# Patient Record
Sex: MALE | Race: WHITE | NOT HISPANIC OR LATINO | Employment: OTHER | URBAN - METROPOLITAN AREA
[De-identification: names, ages, dates, MRNs, and addresses within clinical notes are randomized per-mention and may not be internally consistent; named-entity substitution may affect disease eponyms.]

---

## 2018-03-20 ENCOUNTER — APPOINTMENT (OUTPATIENT)
Dept: LAB | Facility: CLINIC | Age: 83
End: 2018-03-20
Attending: INTERNAL MEDICINE
Payer: MEDICARE

## 2018-03-20 ENCOUNTER — TRANSCRIBE ORDERS (OUTPATIENT)
Dept: LAB | Facility: CLINIC | Age: 83
End: 2018-03-20

## 2018-03-20 DIAGNOSIS — N18.30 CHRONIC KIDNEY DISEASE, STAGE III (MODERATE) (CMS/HCC): ICD-10-CM

## 2018-03-20 DIAGNOSIS — D64.9 ANEMIA, UNSPECIFIED TYPE: Primary | ICD-10-CM

## 2018-03-20 DIAGNOSIS — D64.9 ANEMIA, UNSPECIFIED TYPE: ICD-10-CM

## 2018-03-20 LAB
ALBUMIN SERPL-MCNC: 4.3 G/DL (ref 3.4–5)
ALP SERPL-CCNC: 59 IU/L (ref 35–126)
ALT SERPL-CCNC: 21 IU/L (ref 16–63)
ANION GAP SERPL CALC-SCNC: 10 MEQ/L (ref 3–15)
AST SERPL-CCNC: 28 IU/L (ref 15–41)
BILIRUB SERPL-MCNC: 0.9 MG/DL (ref 0.3–1.2)
BUN SERPL-MCNC: 27 MG/DL (ref 8–20)
CALCIUM SERPL-MCNC: 9.9 MG/DL (ref 8.9–10.3)
CHLORIDE SERPL-SCNC: 106 MMOL/L (ref 98–109)
CO2 SERPL-SCNC: 27 MMOL/L (ref 22–32)
CREAT SERPL-MCNC: 1.5 MG/DL (ref 0.8–1.3)
ERYTHROCYTE [DISTWIDTH] IN BLOOD BY AUTOMATED COUNT: 15.6 % (ref 11.6–14.4)
GFR SERPL CREATININE-BSD FRML MDRD: 44.8 ML/MIN/1.73M*2
GLUCOSE SERPL-MCNC: 78 MG/DL (ref 70–99)
HCT VFR BLDCO AUTO: 42.9 % (ref 40–51)
HGB BLD-MCNC: 14.5 G/DL (ref 13.7–17.5)
MCH RBC QN AUTO: 34.1 PG (ref 28–33.2)
MCHC RBC AUTO-ENTMCNC: 33.8 G/DL (ref 32.2–36.5)
MCV RBC AUTO: 100.9 FL (ref 83–98)
PDW BLD AUTO: 10.6 FL (ref 9.4–12.4)
PLATELET # BLD AUTO: 159 K/UL (ref 150–350)
POTASSIUM SERPL-SCNC: 4.7 MMOL/L (ref 3.6–5.1)
PROT SERPL-MCNC: 6.3 G/DL (ref 6–8.2)
RBC # BLD AUTO: 4.25 M/UL (ref 4.5–5.8)
SODIUM SERPL-SCNC: 143 MMOL/L (ref 136–144)
WBC # BLD AUTO: 5.66 K/UL (ref 3.8–10.5)

## 2018-03-20 PROCEDURE — 80053 COMPREHEN METABOLIC PANEL: CPT

## 2018-03-20 PROCEDURE — 85027 COMPLETE CBC AUTOMATED: CPT

## 2018-03-20 PROCEDURE — 36415 COLL VENOUS BLD VENIPUNCTURE: CPT

## 2018-03-26 ENCOUNTER — TELEPHONE (OUTPATIENT)
Dept: PRIMARY CARE | Facility: CLINIC | Age: 83
End: 2018-03-26

## 2018-03-26 NOTE — TELEPHONE ENCOUNTER
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  Hgb normal.  Cr stable at 1.5.  Thanks

## 2018-06-25 ENCOUNTER — OFFICE VISIT (OUTPATIENT)
Dept: PRIMARY CARE | Facility: CLINIC | Age: 83
End: 2018-06-25
Payer: MEDICARE

## 2018-06-25 ENCOUNTER — APPOINTMENT (OUTPATIENT)
Dept: LAB | Facility: CLINIC | Age: 83
End: 2018-06-25
Attending: INTERNAL MEDICINE
Payer: MEDICARE

## 2018-06-25 VITALS
TEMPERATURE: 97.7 F | HEART RATE: 59 BPM | WEIGHT: 149.2 LBS | DIASTOLIC BLOOD PRESSURE: 82 MMHG | OXYGEN SATURATION: 94 % | RESPIRATION RATE: 17 BRPM | SYSTOLIC BLOOD PRESSURE: 116 MMHG

## 2018-06-25 DIAGNOSIS — I49.8 VENTRICULAR BIGEMINY: ICD-10-CM

## 2018-06-25 DIAGNOSIS — I10 ESSENTIAL HYPERTENSION: ICD-10-CM

## 2018-06-25 DIAGNOSIS — E78.2 MIXED HYPERLIPIDEMIA: ICD-10-CM

## 2018-06-25 DIAGNOSIS — I49.49 PREMATURE BEATS: Primary | ICD-10-CM

## 2018-06-25 DIAGNOSIS — N18.30 CKD (CHRONIC KIDNEY DISEASE) STAGE 3, GFR 30-59 ML/MIN (CMS/HCC): ICD-10-CM

## 2018-06-25 LAB
ANION GAP SERPL CALC-SCNC: 9 MEQ/L (ref 3–15)
BUN SERPL-MCNC: 32 MG/DL (ref 8–20)
CALCIUM SERPL-MCNC: 9.6 MG/DL (ref 8.9–10.3)
CHLORIDE SERPL-SCNC: 105 MMOL/L (ref 98–109)
CO2 SERPL-SCNC: 26 MMOL/L (ref 22–32)
CREAT SERPL-MCNC: 1.7 MG/DL (ref 0.8–1.3)
GFR SERPL CREATININE-BSD FRML MDRD: 38.7 ML/MIN/1.73M*2
GLUCOSE SERPL-MCNC: 87 MG/DL (ref 70–99)
POTASSIUM SERPL-SCNC: 4.3 MMOL/L (ref 3.6–5.1)
SODIUM SERPL-SCNC: 140 MMOL/L (ref 136–144)
TSH SERPL DL<=0.05 MIU/L-ACNC: 1.43 MIU/ML (ref 0.34–5.6)

## 2018-06-25 PROCEDURE — 80048 BASIC METABOLIC PNL TOTAL CA: CPT

## 2018-06-25 PROCEDURE — 36415 COLL VENOUS BLD VENIPUNCTURE: CPT

## 2018-06-25 PROCEDURE — 93000 ELECTROCARDIOGRAM COMPLETE: CPT | Performed by: INTERNAL MEDICINE

## 2018-06-25 PROCEDURE — 99214 OFFICE O/P EST MOD 30 MIN: CPT | Performed by: INTERNAL MEDICINE

## 2018-06-25 PROCEDURE — 84443 ASSAY THYROID STIM HORMONE: CPT

## 2018-06-25 RX ORDER — OMEPRAZOLE 20 MG/1
20 CAPSULE, DELAYED RELEASE ORAL AS NEEDED
COMMUNITY
End: 2023-05-15 | Stop reason: ALTCHOICE

## 2018-06-25 RX ORDER — METOPROLOL SUCCINATE 25 MG/1
25 TABLET, EXTENDED RELEASE ORAL
COMMUNITY
Start: 2015-12-16 | End: 2020-07-24

## 2018-06-25 RX ORDER — LISINOPRIL 40 MG/1
60 TABLET ORAL DAILY
COMMUNITY
Start: 2015-05-28 | End: 2020-12-01 | Stop reason: HOSPADM

## 2018-06-25 RX ORDER — AMLODIPINE BESYLATE 10 MG/1
5 TABLET ORAL DAILY
COMMUNITY
End: 2019-07-26

## 2018-06-25 RX ORDER — FINASTERIDE 5 MG/1
5 TABLET, FILM COATED ORAL DAILY
COMMUNITY

## 2018-06-25 ASSESSMENT — ENCOUNTER SYMPTOMS
HEMATURIA: 0
SHORTNESS OF BREATH: 0
PALPITATIONS: 1
CHILLS: 0
ABDOMINAL PAIN: 0
COUGH: 0
FEVER: 0

## 2018-06-25 NOTE — ASSESSMENT & PLAN NOTE
Patient over the last month or so describes premature cardiac beats.  Sometimes it is occurring in a bigeminal pattern.  Not associated with chest pain, shortness of breath, palpitations waking him from sleep were greatly disturbing.  Symptoms are fairly mild in severity.  Distant history of thyroiditis but nothing recently.  Last blood work was 3 months ago but did not show any abnormalities at that time.  Recommend check basic metabolic profile to rule out new electrolyte disturbance such as hypokalemia that may be causing this.  Check TSH to rule out occult thyroid disease.  No current medications appear to be triggers and he takes no over-the-counter supplements that may be a trigger.  Recommend follow-up with cardiology due to his EKG showing normal sinus rhythm at 61 with frequent PVCs and a ventricular bigeminal pattern.  Patient has had an echocardiogram in the past which has ruled out major structural heart disease.  Cardiology consult may choose to repeat this study.  They cardiology evaluation may result in extended monitoring.  It may be reasonable after evaluation with no contributing causes identified to simply increase his dose of beta-blocker.

## 2018-07-02 ENCOUNTER — TELEPHONE (OUTPATIENT)
Dept: PRIMARY CARE | Facility: CLINIC | Age: 83
End: 2018-07-02

## 2018-07-02 NOTE — TELEPHONE ENCOUNTER
----- Message from Alex Elliott MD sent at 6/29/2018 10:58 AM EDT -----  Please call patient that his lab results are stable.  Creatinine is at his upper limit of his range at 1.7.  He usually runs 1.5 up to 1.7.  TSH normal.  Thanks

## 2018-07-13 ENCOUNTER — OFFICE VISIT (OUTPATIENT)
Dept: CARDIOLOGY | Facility: CLINIC | Age: 83
End: 2018-07-13
Payer: MEDICARE

## 2018-07-13 VITALS
WEIGHT: 146 LBS | BODY MASS INDEX: 24.92 KG/M2 | HEIGHT: 64 IN | SYSTOLIC BLOOD PRESSURE: 110 MMHG | DIASTOLIC BLOOD PRESSURE: 68 MMHG

## 2018-07-13 DIAGNOSIS — I10 ESSENTIAL HYPERTENSION: ICD-10-CM

## 2018-07-13 DIAGNOSIS — I49.49 PREMATURE BEATS: ICD-10-CM

## 2018-07-13 DIAGNOSIS — I35.1 NONRHEUMATIC AORTIC VALVE INSUFFICIENCY: Primary | ICD-10-CM

## 2018-07-13 PROCEDURE — 99205 OFFICE O/P NEW HI 60 MIN: CPT | Performed by: INTERNAL MEDICINE

## 2018-07-13 RX ORDER — SIMVASTATIN 40 MG/1
40 TABLET, FILM COATED ORAL NIGHTLY
COMMUNITY
End: 2018-11-13

## 2018-07-13 NOTE — LETTER
July 13, 2018     Alex Elliott MD  [address] [address]    Patient: Efrain Cardona Jr.   YOB: 1934   Date of Visit: 7/13/2018       Dear Dr. Elliott:    Thank you for referring Efrain Cardona to me for evaluation. Below are my notes for this consultation.    If you have questions, please do not hesitate to call me. I look forward to following your patient along with you.         Sincerely,        Timoteo Kim MD        CC: MD Taran Nowak MD

## 2018-07-13 NOTE — PROGRESS NOTES
Cardiology Consult     Reason for visit: Extrasystoles on EKG.  Chief Complaint   Patient presents with   • Cardiovascular Evaluation     Palpitations   • Hypertension       HPI     Efrain Cardona Jr. is a 84 y.o. male  who presents for  evaluation of premature ectopic complexes documented on EKG.    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  He has been treated for systemic hypertension for about 18-20 years.  He does not have any personal history of myocardial infarction, congestive heart failure or sustained arrhythmia.  He was diagnosed with mild aortic valve insufficiency when a murmur was discovered at the time of his diagnosis of thyroiditis..  He has been aggressively treated for his hypertension since diagnosis.  He enjoys a vigorous active lifestyle and exercises very vigorous levels on a routine basis.    He is very conscientious about his blood pressure and checks in on an intermittent basis at home.  He is noticed on occasion that there is some skipped beats evident when he checks his pulse.  He has no sensation whatsoever of palpitations.  He does not have any unusual sensations of erratic beats in his chest.  He has absolutely no symptoms of chest tightness or other complaints with his exercise program.  He states his exercise program has not diminished substantially for some time.    Seen in his family physician's office an EKG was performed and did demonstrate unifocal isolated extrasystoles with an otherwise normal EKG pattern.    Allergies or intolerances include minocycline and fulvicin.  He no longer takes aspirin because of several episodes of gastrointestinal bleeding and anemia that have resolved since aspirin is been discontinued.    Family history remarkable for coronary bypass graft surgery in his brother.    Drinks alcohol socially.  Not a smoker.    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  He is also had a left inguinal herniorrhaphy.    He has a history of intermittent diverticulitis.  Is been diagnosed with lumbar scoliosis.  In 1991 yet a history of thyroiditis that was treated with steroids and resolved. He does describe sporadic episodes of double vision that occurred over the past several years that have not been diagnosed despite ophthalmologic evaluation.  He experiences intermittent mild pedal edema felt secondary to amlodipine.    Past Medical History:   Diagnosis Date   • Abnormal EKG    • Arrhythmia    • Chronic kidney disease    • Disease of thyroid gland     1991   • Diverticulitis of colon     Diverticulosis   • GI (gastrointestinal bleed)     X3   last 2015   • Heart murmur    • Hyperlipidemia    • Hypertension    • Infectious viral hepatitis    • Lipid disorder    • Renal failure      Past Surgical History:   Procedure Laterality Date   • HERNIA REPAIR      7/2001   • KIDNEY SURGERY Left     kidney cancer     Griseofulvin and Tetracycline  Current Outpatient Prescriptions   Medication Sig Dispense Refill   • allopurinol (ZYLOPRIM) 100 mg tablet 100 mg 2 (two) times a day. Take 2 tablets once daily      • amLODIPine (NORVASC) 10 mg tablet Take 10 mg by mouth daily.     • finasteride (PROSCAR) 5 mg tablet Take 5 mg by mouth daily.     • lisinopril (PRINIVIL) 40 mg tablet 40 mg. Take 1.5 tablets by oral route every day     • metoprolol succinate XL (TOPROL-XL) 25 mg 24 hr tablet 25 mg. Take HALF tablet by oral route every day     • omeprazole (PriLOSEC) 20 mg capsule 20 mg. 1 capsule daily 30 minutes to 1 hour before a meal     • simvastatin (ZOCOR) 40 mg tablet Take 40 mg by mouth nightly.     • ferrous sulfate 325 mg (65 mg iron) ER capsule 1 po BID       No current facility-administered medications for this visit.      Social History     Social History   • Marital status:      Spouse name: N/A   • Number of children: N/A   • Years of  education: N/A     Social History Main Topics   • Smoking status: Former Smoker     Quit date: 1970   • Smokeless tobacco: Never Used      Comment: PIPE    • Alcohol use Yes      Comment: wine, daily   • Drug use: Unknown   • Sexual activity: Not Asked     Other Topics Concern   • None     Social History Narrative   • None     Family History   Problem Relation Age of Onset   • Lung cancer Mother    • Leukemia Father         ROS   Objective   Vitals:    07/13/18 0825   BP: 110/68        Physical Exam   Constitutional: He is oriented to person, place, and time.   Pleasant fit appearing man not acutely distressed.   HENT:   Head: Normocephalic and atraumatic.   Eyes: Conjunctivae are normal. No scleral icterus.   Neck: Normal range of motion. Neck supple. No thyromegaly present.   Cardiovascular: Normal rate, regular rhythm and intact distal pulses.  Exam reveals no gallop.    Murmur (Soft systolic murmur along the right sternal border.  No diastolic murmur heard at rest or provoked with maneuver.) heard.  No orthostatic change of the blood pressure.   Pulmonary/Chest: Effort normal and breath sounds normal.   Abdominal: Soft. Bowel sounds are normal.   Musculoskeletal: Normal range of motion. He exhibits no edema, tenderness or deformity.   Mild scoliosis of the lumbar spine.   Neurological: He is alert and oriented to person, place, and time.   Skin: Skin is warm and dry. No rash noted.   Psychiatric: He has a normal mood and affect. His behavior is normal. Judgment and thought content normal.   Nursing note and vitals reviewed.      Labs   Lab Results   Component Value Date    WBC 5.66 03/20/2018    HGB 14.5 03/20/2018    HCT 42.9 03/20/2018     03/20/2018    CHOL 170 09/19/2017    TRIG 36 09/19/2017    HDL 71 09/19/2017    ALT 21 03/20/2018    AST 28 03/20/2018     06/25/2018    K 4.3 06/25/2018     06/25/2018    CREATININE 1.7 (H) 06/25/2018    BUN 32 (H) 06/25/2018    CO2 26 06/25/2018    TSH  1.43 06/25/2018    PSA 2.02 08/26/2015    INR 1.0 09/22/2015    HGBA1C 4.8 08/26/2015   Imaging  An echocardiogram that was obtained in October was reviewed in detail.  It shows normal left ventricular size and function with preserved ejection fraction.  There is evidence of mild aortic insufficiency.    ECG   sinus rhythm, occasional premature ventricular beats  The underlying pattern is otherwise normal.     Assessment and plan:    1.  Occasional extrasystoles on EKG.  Completely asymptomatic.  Also sounds like they are completely suppressed when he exercises.  They have a unifocal appearance and a sporadic occurrence and occur in the face of stable left ventricular function and good control of hypertension with normal labs.  Therefore my impression that they are benign and prognosis.  They may indeed be related to long history of hypertension and mild AI but in view of their current pattern and limited occurrence and the results of all other studies I would recommend only a course of observation of the symptoms with no limitation in activity, no further cardiac studies and no change in medications.    2.  Hypertension outstanding control in the face of his current medications and exercise regimen.  I would not make any changes in medications.  I suggested that he speak to his nephrologist about the possible as needed use of diuretics for the edema created by amlodipine.    3.  Aortic insufficiency.  Very minimal progression in this finding since it was first discovered in 1991.  Not clearly evident on examination certainly he has no symptoms of congestive heart failure.  Continued aggressive treatment of his blood pressure is indicated and I would certainly continue the ACE inhibitor.  Echocardiography could be considered in the next 6-12 months in follow-up to follow this finding.  He will continue to pursue antibiotic endocarditis prophylaxis under appropriate circumstances.    4.  Single kidney status post  nephrectomy.  Stable creatinine as of recent laboratory studies.    5.  Gastrointestinal bleeding secondary to aspirin.  No clear etiology found despite aggressive GI workup.  He is off aspirin and his hemoglobin is remained stable.    6.  History of thyroiditis.  Recent laboratory studies reveal normal TSH.    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  He is committed to exercise and I see no reason to limit her changes program.  Again I believe that the extrasystoles palpated by him on exam and seen on EKG are benign and have a favorable prognosis based on the current evaluation and physical examination and lack of symptoms.    I hope to see him in 6 months in follow-up to today's visit but will be happy to see him sooner.  I certainly encouraged him to call me if there is any change in the pattern of palpitations or other symptoms that may be related to a change in his cardiovascular status.    Timoteo Kim MD  7/13/2018

## 2018-10-10 ENCOUNTER — OFFICE VISIT (OUTPATIENT)
Dept: PRIMARY CARE | Facility: CLINIC | Age: 83
End: 2018-10-10
Payer: MEDICARE

## 2018-10-10 VITALS
OXYGEN SATURATION: 95 % | TEMPERATURE: 97.5 F | DIASTOLIC BLOOD PRESSURE: 70 MMHG | WEIGHT: 148 LBS | BODY MASS INDEX: 25.4 KG/M2 | SYSTOLIC BLOOD PRESSURE: 114 MMHG | HEART RATE: 64 BPM | RESPIRATION RATE: 12 BRPM

## 2018-10-10 DIAGNOSIS — I49.49 PREMATURE BEATS: ICD-10-CM

## 2018-10-10 DIAGNOSIS — Z23 ENCOUNTER FOR ADMINISTRATION OF VACCINE: Primary | ICD-10-CM

## 2018-10-10 DIAGNOSIS — I35.1 NONRHEUMATIC AORTIC VALVE INSUFFICIENCY: ICD-10-CM

## 2018-10-10 DIAGNOSIS — I10 ESSENTIAL HYPERTENSION: ICD-10-CM

## 2018-10-10 DIAGNOSIS — I49.8 VENTRICULAR BIGEMINY: ICD-10-CM

## 2018-10-10 DIAGNOSIS — E78.2 MIXED HYPERLIPIDEMIA: ICD-10-CM

## 2018-10-10 DIAGNOSIS — N18.30 CKD (CHRONIC KIDNEY DISEASE) STAGE 3, GFR 30-59 ML/MIN (CMS/HCC): ICD-10-CM

## 2018-10-10 PROCEDURE — G0008 ADMIN INFLUENZA VIRUS VAC: HCPCS | Performed by: INTERNAL MEDICINE

## 2018-10-10 PROCEDURE — 90653 IIV ADJUVANT VACCINE IM: CPT | Performed by: INTERNAL MEDICINE

## 2018-10-10 PROCEDURE — 99214 OFFICE O/P EST MOD 30 MIN: CPT | Mod: 25 | Performed by: INTERNAL MEDICINE

## 2018-10-10 RX ORDER — AMOXICILLIN 500 MG/1
CAPSULE ORAL
Refills: 0 | COMMUNITY
Start: 2018-10-08 | End: 2020-07-24

## 2018-10-10 ASSESSMENT — ENCOUNTER SYMPTOMS
COUGH: 0
ABDOMINAL PAIN: 0
FEVER: 0
SHORTNESS OF BREATH: 0
HEMATURIA: 0
CHILLS: 0
PALPITATIONS: 1

## 2018-10-10 NOTE — ASSESSMENT & PLAN NOTE
Patient was seen by his cardiologist Dr. Timoteo Francis who was not alarmed by his PVCs.  Patient reports that his symptomatic premature beats are much less frequent in the last month or so.  Continue to monitor and continue beta-blocker.

## 2018-10-10 NOTE — PROGRESS NOTES
Alex Elliott MD  Geriatric Medicine                                                    GERIATRIC MEDICINE    Subjective      Patient ID: Efrain Cardona Jr. is a 84 y.o. male.    Disease Management Visit    The patient is here for Disease Management for evaluation and management of the following chronic medical problems.    New problem:  For the last month or so patient has had frequent premature beats.  Occasionally occurring in a bigeminal pattern.  Not associated with chest pain, shortness of breath, palpitations that are significant or disturbing.  Her distant history of thyroiditis but normal TSH on recent monitoring over the last several years.    And    Chronic problem review  This includes: symptom review, medication regimen review, laboratory monitoring, and review of diagnostic testing.    Hypertension  --- Update 6/25/18: Stable.  ---update 2/26/18: Stable; still occasional edema on hot days from amlodipine but he is tolerating it.  ---Update 10/30/17: home BP stable.  --- Update 7/5/17: Home BP tracking stable--however he reports some variability  --- Previous visits: BP is stable on dose reduction of Norvasc and a small dose of Toprol --summertime edema from Norvasc.  --- Previous visit:Patient notes that his home blood pressure monitoring is all from 10-20 points higher than what we check in the office.  He reports running in the systolic range of 125-140 at home.  --- Patient asked about switching from Vasotec to lisinopril do to major cost advantages.  He also notes summertime edema from Norvasc.  Medications  Toprol 12.5 mg daily  Norvasc 10 mg daily    -he does report some mild venous insufficiency edema from the higher dose of Norvasc.  Lisinopril 30 mg daily       (((   ASA   )))  --stopped due to study from [place] recommending against ASA for primary prevention    Anemia  -He has recurrent iron deficiency anemia.  --- Update 6/25/18: Stable.  Recent hemoglobin has been normal most  recently 3/20/18 was 14.5.  This is normalized since he stopped aspirin.  ---update 2/26/18: EGD done by GI this summer was negative.He is due for follow-up CBC next month.  Already ordered.  --- Update 7/5/17: Patient has had no signs of GI bleeding.  --- [place] to be due to AVM somewhere in his GI tract causing occult GI bleeding.  ---His recent hemoglobin was 9/19/17 Hgb 14.3  -off ASA  2/15/17 hemoglobin was 13.1  8/18/16 was 14.1  5/25/16 was 12.8  12/30/15 was 14.1  6/30/15 was 13.9  ---Previous visit:  He has since stopped his iron for his capsule endoscopy study which was done approximately 3 weeks ago to evaluate colonic and gastric AVMs as well as the small bowel for source of blood loss.----Patient reports that his capsule endoscopy study was negative. GI recommended stopping aspirin.  ---His gastroenterologist feels this is most likely do to either gastric or colonic AVMs  Last EGD and colonoscopy were done in July 2014.    Hypercholesterolemia  -Goal of treatment is LDL around 100  Primary prevention strategy  Last lipids  9/19/17:    HDL 71  LDL 92  2/15/17: Total cholesterol 151 HDL 69 LDL 77  5/25/16: Total cholesterol 168 HDL 72 LDL 81  8/26/15: Total cholesterol 198 HDL 71   11/24/14:    HDL 79  LDL 84  Medications  Zocor 40 mg daily    GERD  Medications  Prilosec 20 mg daily    BPH  Medication  Proscar 5 mg daily    Gout  --- Update 2/26/18: No recent attacks of gout  Last uric acid 9/19/17 was 4.6  6/30/15 was 4.1  Medications  Allopurinol 200 mg daily  (Prednisone 20-30 mg when necessary with a 2-3 day taper for acute attacks--- patient initiates treatment himself with this regimen)    Chronic kidney disease  ---s/p Left Nephrectomy 2006  -Transitional Cell Ca  Creatinine ranges 1.4-1.8  Most recent creatinine on 3/20/18 was 1.5  6/29/17 was 1.6  2/15/17 was 1.6  8/18/16 was 1.6  5/25/16 was 1.7  12/30/15 was 1.4  4/1/15  was 1.6    History of colon polyp    History of  intermittent diverticulitis  -Treated with Augmentin or goes to Cipro/Flagyl if persistent symptoms    Low back pain  -Chronic degenerative scoliosis    -------------------    Acute problem August 2016 -- diplopia  Progress note:  Over the last 6 months she has had at least 3 episodes of very brief diplopia lasting 1-2 minutes.  He reports if he shuts either eye the sensation is eliminated. With both eyes open it occurs.  This resolves spontaneously within 2 minutes.  Patient denies any other associated neurological symptoms including: He has not had weakness, fatigue, dizziness, speech trouble, etc....  Follow-up: Patient saw his ophthalmologist who examined him and ordered an MRI of the brain which was unremarkable and carotid Doppler study which was normal. He suspected that he had transient extraocular muscle weakness for unclear reasons.  He has not had a recurrence of his symptoms since July 2016.    Hosp ER Eval  9/22/15:  non-spec ABD Pain w normal CT ABD.    -------------------    Preventive health care and Screening summary:   UPDATE  3/6/17  Colonoscopy  -August 2014  Flu vaccine  2014; 2015 HD  -pharm 12/16/15 for HD; high-dose 12/5/16  Pneumovax  Booster 2011; Prevnar 13 given 2/23/15  dT Booster  Tdap March 2012  Zostavax  --patient did not get this from the pharmacy as ordered and now he wants to await the second generation Zostavax from a different  which may have better efficacy--possibly coming later in 2017  PSA  -2.02  8/26/15--note patient is now over the age of 81 so that screening PSA no longer recommended        The following have been reviewed and updated as appropriate in this visit:         Past Medical History:   Diagnosis Date   • Abnormal EKG    • Arrhythmia    • Chronic kidney disease    • Disease of thyroid gland     1991   • Diverticulitis of colon     Diverticulosis   • GI (gastrointestinal bleed)     X3   last 2015   • Heart murmur    • Hyperlipidemia    • Hypertension     • Infectious viral hepatitis    • Lipid disorder    • Renal failure        Past Surgical History:   Procedure Laterality Date   • HERNIA REPAIR      7/2001   • KIDNEY SURGERY Left     kidney cancer       Family History   Problem Relation Age of Onset   • Lung cancer Mother    • Leukemia Father        Social History     Social History   • Marital status:      Spouse name: N/A   • Number of children: N/A   • Years of education: N/A     Occupational History   • Not on file.     Social History Main Topics   • Smoking status: Former Smoker     Quit date: 1970   • Smokeless tobacco: Never Used      Comment: PIPE    • Alcohol use Yes      Comment: wine, daily   • Drug use: Unknown   • Sexual activity: Not on file     Other Topics Concern   • Not on file     Social History Narrative   • No narrative on file       Allergies   Allergen Reactions   • Griseofulvin      Other reaction(s): UNKNOWN  fulvicin    • Tetracycline Rash and Other (see comments)     minocin        Current Outpatient Prescriptions   Medication Sig Dispense Refill   • allopurinol (ZYLOPRIM) 100 mg tablet 100 mg 2 (two) times a day. Take 2 tablets once daily      • amLODIPine (NORVASC) 10 mg tablet Take 10 mg by mouth daily.     • amoxicillin (AMOXIL) 500 mg capsule take 4 capsules by mouth 1 hour prior to appointment  0   • ferrous sulfate 325 mg (65 mg iron) ER capsule Takes as needed when hemoglobin in low      • finasteride (PROSCAR) 5 mg tablet Take 5 mg by mouth daily.     • lisinopril (PRINIVIL) 40 mg tablet 40 mg. Take 1.5 tablets by oral route every day     • metoprolol succinate XL (TOPROL-XL) 25 mg 24 hr tablet 25 mg. Take HALF tablet by oral route every day     • omeprazole (PriLOSEC) 20 mg capsule 20 mg. 1 capsule daily 30 minutes to 1 hour before a meal     • simvastatin (ZOCOR) 40 mg tablet Take 40 mg by mouth nightly.       No current facility-administered medications for this visit.        Review of Systems   Constitutional:  Negative for chills and fever.   Respiratory: Negative for cough and shortness of breath.    Cardiovascular: Positive for palpitations. Negative for chest pain.        Premature beats-occasionally but much less often   Gastrointestinal: Negative for abdominal pain.   Genitourinary: Negative for hematuria.       Objective     Vitals:    10/10/18 1057   BP: 114/70   Pulse: 64   Resp: 12   Temp: 36.4 °C (97.5 °F)   SpO2: 95%       Physical Exam   Constitutional: He appears well-developed and well-nourished.   HENT:   Head: Normocephalic.   Cardiovascular: Normal rate, regular rhythm and normal heart sounds.    Pulmonary/Chest: Effort normal and breath sounds normal. No respiratory distress. He has no wheezes. He has no rales.   Abdominal: Soft. Bowel sounds are normal. There is no tenderness.   Musculoskeletal: He exhibits no edema.   Neurological: He is alert.       Lab Results   Component Value Date    WBC 5.66 03/20/2018    HGB 14.5 03/20/2018    HCT 42.9 03/20/2018    .9 (H) 03/20/2018     03/20/2018         Chemistry        Component Value Date/Time     06/25/2018 1215    K 4.3 06/25/2018 1215     06/25/2018 1215    CO2 26 06/25/2018 1215    BUN 32 (H) 06/25/2018 1215    CREATININE 1.7 (H) 06/25/2018 1215        Component Value Date/Time    CALCIUM 9.6 06/25/2018 1215    ALKPHOS 59 03/20/2018 1247    AST 28 03/20/2018 1247    ALT 21 03/20/2018 1247    BILITOT 0.9 03/20/2018 1247            Lab Results   Component Value Date    CHOL 170 09/19/2017    CHOL 151 02/15/2017    CHOL 160 05/25/2016     Lab Results   Component Value Date    HDL 71 09/19/2017    HDL 69 02/15/2017    HDL 72 05/25/2016     Lab Results   Component Value Date    LDLCALC 92 09/19/2017    LDLCALC 77 02/15/2017    LDLCALC 81 05/25/2016     Lab Results   Component Value Date    TRIG 36 09/19/2017    TRIG 27 (L) 02/15/2017    TRIG 35 05/25/2016     No results found for: CHOLHDL    Lab Results   Component Value Date    TSH  1.43 06/25/2018       Lab Results   Component Value Date    HGBA1C 4.8 08/26/2015       No results found for: HAV, HEPAIGM, HEPBIGM, HEPBCAB, HBEAG, HEPCAB    No results found for: MICROALBUR, [id number]    Assessment/Plan   1. Essential hypertension    2. CKD (chronic kidney disease) stage 3, GFR 30-59 ml/min (CMS/HCC) (HCC)    3. Mixed hyperlipidemia    4. Ventricular bigeminy    5. Nonrheumatic aortic valve insufficiency    6. Premature beats      Problem List Items Addressed This Visit     Premature beats    Current Assessment & Plan     Patient was seen by his cardiologist Dr. Timoteo Francis who was not alarmed by his PVCs.  Patient reports that his symptomatic premature beats are much less frequent in the last month or so.  Continue to monitor and continue beta-blocker.         Essential hypertension - Primary    Current Assessment & Plan     Blood pressure is well controlled on current treatment.  Continue current treatment.         Relevant Orders    Comprehensive metabolic panel    CBC    CKD (chronic kidney disease) stage 3, GFR 30-59 ml/min (CMS/HCC) (HCC)    Current Assessment & Plan     Creatinine is stable with one kidney at 1.7.  Check CMP again in November.         Mixed hyperlipidemia    Current Assessment & Plan     Lipid numbers were excellent in March.  Continue current treatment         Ventricular bigeminy    Current Assessment & Plan     As above.         Nonrheumatic aortic valve insufficiency    Current Assessment & Plan     Mild aortic insufficiency.  Continue antibiotic prophylaxis for dental work.               No Follow-up on file.    Orders Placed This Encounter   Procedures   • Comprehensive metabolic panel     Standing Status:   Future     Standing Expiration Date:   10/10/2019   • CBC     Standing Status:   Future     Standing Expiration Date:   10/10/2019         Alex Elliott MD  10/10/2018

## 2018-11-13 ENCOUNTER — APPOINTMENT (OUTPATIENT)
Dept: LAB | Facility: CLINIC | Age: 83
End: 2018-11-13
Attending: FAMILY MEDICINE
Payer: MEDICARE

## 2018-11-13 ENCOUNTER — HOSPITAL ENCOUNTER (OUTPATIENT)
Dept: RADIOLOGY | Facility: CLINIC | Age: 83
Discharge: HOME | End: 2018-11-13
Attending: FAMILY MEDICINE
Payer: MEDICARE

## 2018-11-13 ENCOUNTER — OFFICE VISIT (OUTPATIENT)
Dept: PRIMARY CARE | Facility: CLINIC | Age: 83
End: 2018-11-13
Payer: MEDICARE

## 2018-11-13 VITALS
TEMPERATURE: 97.4 F | SYSTOLIC BLOOD PRESSURE: 130 MMHG | DIASTOLIC BLOOD PRESSURE: 70 MMHG | OXYGEN SATURATION: 96 % | WEIGHT: 152 LBS | BODY MASS INDEX: 26.09 KG/M2 | HEART RATE: 62 BPM

## 2018-11-13 DIAGNOSIS — N18.30 CKD (CHRONIC KIDNEY DISEASE) STAGE 3, GFR 30-59 ML/MIN (CMS/HCC): ICD-10-CM

## 2018-11-13 DIAGNOSIS — G72.9 MYOPATHY: ICD-10-CM

## 2018-11-13 DIAGNOSIS — I10 ESSENTIAL HYPERTENSION: ICD-10-CM

## 2018-11-13 DIAGNOSIS — M25.512 ACUTE PAIN OF LEFT SHOULDER: ICD-10-CM

## 2018-11-13 DIAGNOSIS — M25.512 ACUTE PAIN OF LEFT SHOULDER: Primary | ICD-10-CM

## 2018-11-13 DIAGNOSIS — M19.012 ARTHRITIS OF LEFT ACROMIOCLAVICULAR JOINT: ICD-10-CM

## 2018-11-13 LAB
ANION GAP SERPL CALC-SCNC: 10 MEQ/L (ref 3–15)
BASOPHILS # BLD: 0.05 K/UL (ref 0.01–0.1)
BASOPHILS NFR BLD: 0.7 %
BUN SERPL-MCNC: 21 MG/DL (ref 8–20)
CALCIUM SERPL-MCNC: 9.1 MG/DL (ref 8.9–10.3)
CHLORIDE SERPL-SCNC: 100 MEQ/L (ref 98–109)
CK SERPL-CCNC: 85 U/L (ref 16–300)
CO2 SERPL-SCNC: 28 MEQ/L (ref 22–32)
CREAT SERPL-MCNC: 1.3 MG/DL (ref 0.8–1.3)
CRP SERPL-MCNC: <=6 MG/L
DIFFERENTIAL METHOD BLD: NORMAL
EOSINOPHIL # BLD: 0.1 K/UL (ref 0.04–0.54)
EOSINOPHIL NFR BLD: 1.4 %
ERYTHROCYTE [DISTWIDTH] IN BLOOD BY AUTOMATED COUNT: 15.5 % (ref 11.6–14.4)
ERYTHROCYTE [SEDIMENTATION RATE] IN BLOOD BY WESTERGREN METHOD: <3 MM/HR
GFR SERPL CREATININE-BSD FRML MDRD: 52.7 ML/MIN/1.73M*2
GLUCOSE SERPL-MCNC: 87 MG/DL (ref 70–99)
HCT VFR BLDCO AUTO: 43.9 % (ref 40.1–51)
HGB BLD-MCNC: 14.7 G/DL (ref 13.7–17.5)
IMM GRANULOCYTES # BLD AUTO: 0.06 K/UL (ref 0–0.08)
IMM GRANULOCYTES NFR BLD AUTO: 0.9 %
LYMPHOCYTES # BLD: 2.24 K/UL (ref 1.2–3.5)
LYMPHOCYTES NFR BLD: 32 %
MCH RBC QN AUTO: 34.2 PG (ref 28–33.2)
MCHC RBC AUTO-ENTMCNC: 33.5 G/DL (ref 32.2–36.5)
MCV RBC AUTO: 102.1 FL (ref 83–98)
MONOCYTES # BLD: 0.67 K/UL (ref 0.3–1)
MONOCYTES NFR BLD: 9.6 %
NEUTROPHILS # BLD: 3.88 K/UL (ref 1.7–7)
NEUTS SEG NFR BLD: 55.4 %
NRBC BLD-RTO: 0 %
PDW BLD AUTO: 10.3 FL (ref 9.4–12.4)
PLATELET # BLD AUTO: 171 K/UL (ref 150–350)
POTASSIUM SERPL-SCNC: 4.5 MEQ/L (ref 3.6–5.1)
RBC # BLD AUTO: 4.3 M/UL (ref 4.5–5.8)
SODIUM SERPL-SCNC: 138 MEQ/L (ref 136–144)
TSH SERPL DL<=0.05 MIU/L-ACNC: 1.61 MIU/L (ref 0.34–5.6)
WBC # BLD AUTO: 7 K/UL (ref 3.8–10.5)

## 2018-11-13 PROCEDURE — 73060 X-RAY EXAM OF HUMERUS: CPT | Mod: LT

## 2018-11-13 PROCEDURE — 72040 X-RAY EXAM NECK SPINE 2-3 VW: CPT

## 2018-11-13 PROCEDURE — 86140 C-REACTIVE PROTEIN: CPT

## 2018-11-13 PROCEDURE — 73030 X-RAY EXAM OF SHOULDER: CPT | Mod: LT

## 2018-11-13 PROCEDURE — 85025 COMPLETE CBC W/AUTO DIFF WBC: CPT

## 2018-11-13 PROCEDURE — 82550 ASSAY OF CK (CPK): CPT

## 2018-11-13 PROCEDURE — 99215 OFFICE O/P EST HI 40 MIN: CPT | Performed by: FAMILY MEDICINE

## 2018-11-13 PROCEDURE — 85652 RBC SED RATE AUTOMATED: CPT

## 2018-11-13 PROCEDURE — 80048 BASIC METABOLIC PNL TOTAL CA: CPT

## 2018-11-13 PROCEDURE — 36415 COLL VENOUS BLD VENIPUNCTURE: CPT

## 2018-11-13 PROCEDURE — 84443 ASSAY THYROID STIM HORMONE: CPT

## 2018-11-13 NOTE — Clinical Note
Saw your patient today. Strange history and presentation. Let me know if you have any other thoughts. He will followup if not improving.

## 2018-11-13 NOTE — PATIENT INSTRUCTIONS
Patient Education     Shoulder Pain  Many things can cause shoulder pain, including:  · An injury to the area.  · Overuse of the shoulder.  · Arthritis.  The source of the pain can be:  · Inflammation.  · An injury to the shoulder joint.  · An injury to a tendon, ligament, or bone.  Follow these instructions at home:  Take these actions to help with your pain:  · Squeeze a soft ball or a foam pad as much as possible. This helps to keep the shoulder from swelling. It also helps to strengthen the arm.  · Take over-the-counter and prescription medicines only as told by your health care provider.  · If directed, apply ice to the area:  ¨ Put ice in a plastic bag.  ¨ Place a towel between your skin and the bag.  ¨ Leave the ice on for 20 minutes, 2-3 times per day. Stop applying ice if it does not help with the pain.  · If you were given a shoulder sling or immobilizer:  ¨ Wear it as told.  ¨ Remove it to shower or bathe.  ¨ Move your arm as little as possible, but keep your hand moving to prevent swelling.  Contact a health care provider if:  · Your pain gets worse.  · Your pain is not relieved with medicines.  · New pain develops in your arm, hand, or fingers.  Get help right away if:  · Your arm, hand, or fingers:  ¨ Tingle.  ¨ Become numb.  ¨ Become swollen.  ¨ Become painful.  ¨ Turn white or blue.  This information is not intended to replace advice given to you by your health care provider. Make sure you discuss any questions you have with your health care provider.  Document Released: 09/27/2006 Document Revised: 08/13/2017 Document Reviewed: 04/11/2016  Elsevier Interactive Patient Education © 2017 [employer]             SALONPA 4%topical lidocaine  ASPERCREME  HEAT/ICE  TYLENOL  1gb bedtime

## 2018-11-13 NOTE — Clinical Note
Radha-- can you let him know about his xray results without overt findings explaining his pain. Would like to obtain more imaging. I will be out this week. Can Dr. PELAEZ followup for imaging /work up?  Has labs pending. Thank you

## 2018-11-13 NOTE — PROGRESS NOTES
"          OFFICE VISIT NOTE ESTABLISHED    LEONCIO MENDOZA DO        PATIENT NAME:Efrain Cardona Jr.  PATIENT : 6/10/1934  KRYSTIN: 2018    CC:   Chief Complaint   Patient presents with   • Shoulder Pain     past 2 nights   \"acute shoulder pain pain\"           HPI   Efrain Cardona Jr. 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     #left shoulder,arm  Pain at night  - 2 weeks ago noted 7/10 should arm pain while sleeping and interfering with sleep. He did not have associated chest pain, sob, heart palpitation. The pain nearly resolves down to a level 2/10 during the day.  He denies associated weakness of left upper extremities, dropping objects, tremors, numbness, tingling, arm UE swelling.. Has noted some mild diff with fine motor skills button behind pans pocket.  He took prednisone 30mg and taper down completed yesterday which helped someone but not fully resolved pain. He is not sleeping on his left side but sleeps on back supine.   - he denies associated slurred speech, skin changes, weakness. Denies trauma or falls.   - declines EKG, cardiac work up and does not have regrets regarding this  - he tried taking aleve with some relief during the day  -he denies pain associated with headcervical movement  - POing well, denies constipation, diarrhea, fever, chills, night sweats  -admits to flu shot in the left region oct 10th  - denies hx of rheumatic fever, stroke        #HTN- norvasc 10 and toprol 12.5  - no longer on aspirin for GIB  - denies chest pain, [place] htn, blurred vision    #HLD- stopped taking  zocor because of left arm pain incase it was 2/2 myositis, mylagia etc    #GERD- prilosec 20    #BPH-proscar 5    #TENA- stable since aspirin DCed    #gout- allopurinol 200mg    #CKD3- cr 1.4-1.8. 1.7 stable   - hx of transitional cell carcinoma s/p left nephrectomy   - once year Dr. Aly gonsalez      TTE " 10/2017  Summary  Normal left ventricular size and function.  Ejection fraction 60%.  Normal right ventricular size and function.  Normal left atrium.  Normal right atrium.  Mild mitral annular calcification.  Sclerotic trileaflet aortic valve with good leaflet excursion. Sclerotic aortic root.  Mild to moderate aortic regurgitation.  Mild tricuspid regurgitation with estimated right ventricular systolic pressure of 29 mmHg.  Grade I diastolic dysfunction-Impaired relaxation.  Findings  Left Ventricle  Normal left ventricular size and function.  Ejection fraction 60%.             The following have been reviewed and updated as appropriate in this visit: active problem list, medication list, allergies, family history, social history, health maintenance          Past Medical History:   Diagnosis Date   • Abnormal EKG    • Arrhythmia    • Chronic kidney disease    • Disease of thyroid gland     1991   • Diverticulitis of colon     Diverticulosis   • GI (gastrointestinal bleed)     X3   last 2015   • Heart murmur    • Hyperlipidemia    • Hypertension    • Infectious viral hepatitis    • Lipid disorder    • Renal failure          Past Surgical History:   Procedure Laterality Date   • HERNIA REPAIR      7/2001   • KIDNEY SURGERY Left     kidney cancer     Social History     Social History   • Marital status:      Spouse name: N/A   • Number of children: N/A   • Years of education: N/A     Occupational History   • Not on file.     Social History Main Topics   • Smoking status: Former Smoker     Quit date: 1970   • Smokeless tobacco: Never Used      Comment: PIPE    • Alcohol use Yes      Comment: wine, daily   • Drug use: Unknown   • Sexual activity: Not on file     Other Topics Concern   • Not on file     Social History Narrative   • No narrative on file         Family History   Problem Relation Age of Onset   • Lung cancer Mother    • Leukemia Father          Allergies   Allergen Reactions   • Griseofulvin       Other reaction(s): UNKNOWN  fulvicin    • Tetracycline Rash and Other (see comments)     minocin          Current Outpatient Prescriptions   Medication Sig Dispense Refill   • allopurinol (ZYLOPRIM) 100 mg tablet 100 mg 2 (two) times a day. Take 2 tablets once daily      • amLODIPine (NORVASC) 10 mg tablet Take 10 mg by mouth daily.     • amoxicillin (AMOXIL) 500 mg capsule take 4 capsules by mouth 1 hour prior to appointment  0   • ferrous sulfate 325 mg (65 mg iron) ER capsule Takes as needed when hemoglobin in low      • finasteride (PROSCAR) 5 mg tablet Take 5 mg by mouth daily.     • lisinopril (PRINIVIL) 40 mg tablet 40 mg. Take 1.5 tablets by oral route every day     • metoprolol succinate XL (TOPROL-XL) 25 mg 24 hr tablet 25 mg. Take HALF tablet by oral route every day     • omeprazole (PriLOSEC) 20 mg capsule 20 mg. 1 capsule daily 30 minutes to 1 hour before a meal       No current facility-administered medications for this visit.          ROS  See hpi.   General: Denies fatigue, weight loss or weight gain.    Head: Denies headaches trauma   Eyes: Denies blurry vision, diplopia, decreased vision.   Ears: Denies tinnitis, decreased hearing  Neck: Denies lumps, bumps.  Denies dysphagia, odynophagia  Pulmonary:  Denies shortness of breath, difficulty breathing, excessive drooling, change in sputum.   Cardiac: Denies chest pain, flip-flop sensation   GI/Abdomen: Denies vomit, nausea, diarrhea, constipation, hematochezia.  Denies abdominal pain.    Uro/: Denies hematuria, urgency, frequency, burning sensation with urination. Denies discharge.   Skin: Denies lumps, bumps, rash.   MSK:  Denies weakness, numbness, tingling.  +left shoulder UE pain  Neuro: Denies confusion, vertigo.              VITALS  Vitals:    11/13/18 1411   BP: 130/70   Pulse: 62   Temp: 36.3 °C (97.4 °F)   SpO2: 96%             Wt Readings from Last 3 Encounters:   11/13/18 68.9 kg (152 lb)   10/10/18 67.1 kg (148 lb)   07/13/18 66.2 kg  "(146 lb)       Ht Readings from Last 3 Encounters:   07/13/18 1.626 m (5' 4\")       BP Readings from Last 3 Encounters:   11/13/18 130/70   10/10/18 114/70   07/13/18 110/68         PHYSICAL EXAM  General: Appears well, in no distress. Pt is pleasant. Hygiene normal.  Head: NC/AT.   Eyes: Conjunctiva and sclera normal bilaterally. No lid-lag.  EOMI. Glasses   Ears: No tenderness of external ears bilterally  Mouth: Oral mucosa pink and moist.No erythema or edema of oral pharynx. No tonsillar exudates or hypertrophy. Uvula midline and without swelling.   Neck: Inspection normal. Trachea midline. supple, FROM without pain. No cervical lymphadenopathy.  Cardiac: regular, rate, and rhythm. 2/6 sys and diastolic murmurs no rubs, or gallops.  Lungs: negative for respiratory distress with normal respiratory effort. Lungs clear to auscultation bilaterally. No wheezes, rales, or rhonchi. No intercostal retractions  Abdomen: +BS. Bowel sounds normal. Abdomen is soft, non-distended. No tenderness. No masses or organomegaly. No guarding.   Skin: warm and dry. No erythema or rash.  Neuro: CNII-XII intact.   Extremities: No peripheral edema or extremity lymphadenopathy  MSK: 5/5 UE and LE b/l.  FROM CTL spine.   No tenderness cervical thoracic midspinous process   mild reproduced pain with left head sidebending  And rotation  Negative spurling testing  +cross over test, left  +neers, left  Pain with AROM not Passive  Crepitus of left shoulder   Tenderness to palpation of scapular spine, supra and infraspinatous          Motor: Normal bulk and tone. No adventitious movements or bradykinesia. No pronator drift.       UE:     Shoulder abduction (Right 5, Left 5)    Elbow flexion (Right 5, Left 5)    Elbow extension (Right 5, Left 5)    Wrist extension (Right 5, Left 5)     (Right 5, Left 5)          Sensory: Sensation intact to vibration and light touch throughout,     Gait stable and intact. Sitting Balance stable.   Psych: " linear. Normal mood and affect.  No SI/HI. AAOX3.               PERTINENT LABS, IMAGING  Lab Results   Component Value Date    WBC 5.66 03/20/2018    HGB 14.5 03/20/2018    HCT 42.9 03/20/2018    .9 (H) 03/20/2018     03/20/2018         Chemistry        Component Value Date/Time     06/25/2018 1215    K 4.3 06/25/2018 1215     06/25/2018 1215    CO2 26 06/25/2018 1215    BUN 32 (H) 06/25/2018 1215    CREATININE 1.7 (H) 06/25/2018 1215        Component Value Date/Time    CALCIUM 9.6 06/25/2018 1215    ALKPHOS 59 03/20/2018 1247    AST 28 03/20/2018 1247    ALT 21 03/20/2018 1247    BILITOT 0.9 03/20/2018 1247            Lab Results   Component Value Date    CHOL 170 09/19/2017    CHOL 151 02/15/2017    CHOL 160 05/25/2016     Lab Results   Component Value Date    HDL 71 09/19/2017    HDL 69 02/15/2017    HDL 72 05/25/2016     Lab Results   Component Value Date    LDLCALC 92 09/19/2017    LDLCALC 77 02/15/2017    LDLCALC 81 05/25/2016     Lab Results   Component Value Date    TRIG 36 09/19/2017    TRIG 27 (L) 02/15/2017    TRIG 35 05/25/2016     No results found for: CHOLHDL    Lab Results   Component Value Date    TSH 1.43 06/25/2018       Lab Results   Component Value Date    HGBA1C 4.8 08/26/2015       No results found for: HAV, HEPAIGM, HEPBIGM, HEPBCAB, HBEAG, HEPCAB    No results found for: MICROALBUR, [id number]    No results found for this or any previous visit (from the past 24 hour(s)).    No results found.            Immunization History   Administered Date(s) Administered   • Influenza Split High Dose Preservative Free IM 12/05/2016, 10/30/2017   • Influenza TIV (IM) 01/03/2011, 12/19/2012   • Influenza Vaccine 65 And Older Preservative Free 10/10/2018   • Influenza Vaccine Quadrivalent Preservative Free 6-35 Months 12/09/2013, 11/24/2014   • Influenza, Unspecified 11/24/2014   • Pneumococcal Conjugate 13-Valent 02/23/2015   • Tdap 03/12/2012         Health Maintenance   Topic Date  Due   • Medicare Annual Wellness VIsit  06/10/1934   • Colonoscopy  06/10/1985   • Zoster Vaccines  06/10/1995   • Annual Falls Risk Screening  06/10/2000   • Pneumococcal 65+ Years/ High and Highest Risk (2 of 2 - PPSV23) 04/20/2015   • DTaP, Tdap, and Td Vaccines (2 - Td) 03/12/2022   • HPV Vaccines  Aged Out   • Meningococcal Vaccine  Aged Out   • HIB Vaccines  Aged Out   • IPV Vaccines  Aged Out   • Influenza Vaccine  Completed            Diagnosis Plan   1. Acute pain of left shoulder  X-RAY CERVICAL SPINE 2 OR 3 VIEWS    X-RAY SHOULDER LEFT 2+ VIEWS    X-RAY HUMERUS LEFT    CBC and differential    Basic metabolic panel    Sedimentation rate, automated    C-reactive protein    TSH w reflex FT4    Ambulatory referral to Orthopedic Surgery   2. Arthritis of left acromioclavicular joint     3. CKD (chronic kidney disease) stage 3, GFR 30-59 ml/min (CMS/[facility]) ([facility])     4. Essential hypertension     5. 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     #left arm pain, new acute   -unclear etiology but concerning with nature of night time awakening severe pain. However pain resolves during day when not supine sleeping. Clinical exam with good ROM but s/s c/f  Chronic RTC tear vs AC joint pathology and scapular instability. However this amount of nocturnal pain does not seem c/w MSK path. With hx of TCC s/p nephrectomy would like to ruleout malignancy despite lack of systemic s/s. Less likely PMR with UL nature. Other ddx cervical myelopathy. Less likely 2/2 to flu vaccine (pt query) , brachial neuritis, or shingles. Reviewed s/s such as chest pain, worsening pain with focal weakness, swelling prompting to proceed to ER.     - referral to  For eval and management  - plain films. LEFT shoulder, humerus and cervical spine   - blood work, ESR, CRP, cbc, bmp, tsh  , CPK   - if work up negative and still with persistent pain sara need more work up poss chest CT  (rule out lung path) and left shoulder MRI (in setting of tcc cancer look for referred pain worse at night is concerning), dopplers   - pain supportive tx: topical lidocaine or aspercreme. Heat/ice. Tylenol 1gm bedtime and PRN.   -followup within 1-2 weeks if not improving    #HTN- norvasc 10 and toprol 12.5  - no longer on aspirin for GIB  - denies chest pain, [place]rolled htn, blurred vision    #HLD- stopped taking  zocor because of left arm pain incase it was 2/2 myositis, mylagia et. Ok to hold for now.     #GERD- prilosec 20    #BPH-proscar 5. stable    #TENA- stable since aspirin DCed    #gout- allopurinol 200mg. stable    #CKD3- cr 1.4-1.8. 1.7 stable   - hx of transitional cell carcinoma s/p left nephrectomy 2006  - once year Dr. Aly gonsalez    I have spent 45 min of which > 50% in coordination of care, counseling discussing work up.   Unabe to reach pt with xray results. Left VM    CCed to PCP. Dr. Elliott   Return if symptoms worsen or fail to improve, for with PCP.    Frances Block,

## 2018-11-14 NOTE — PROGRESS NOTES
Dr Elliott Mr harmeet Cardona was treated by Dr Uamir schmid for shoulder pain- see note re xray....

## 2018-11-15 ENCOUNTER — TELEPHONE (OUTPATIENT)
Dept: PRIMARY CARE | Facility: CLINIC | Age: 83
End: 2018-11-15

## 2018-11-15 NOTE — TELEPHONE ENCOUNTER
----- Message from Frances Block DO sent at 11/15/2018  8:50 AM EST -----  Results Reviewed: Results are normal. labwork all normal including CPK, ESR, CRP. I have tried calling patients multiple times yesterday and today left VM no luck. Radha can you try to see if we can reach him for call back. CCed to pcp and MA. If pain persistent would like him to see Dr. Elliott for eval for CXR /further work up.

## 2018-11-23 ENCOUNTER — TELEPHONE (OUTPATIENT)
Dept: PRIMARY CARE | Facility: CLINIC | Age: 83
End: 2018-11-23

## 2018-11-23 NOTE — TELEPHONE ENCOUNTER
Spoke with pt for followup.   He says pain is down to 2/10 and no longer nightly bedtime supine pattern.  Reviewed labs. He thinks it may be correlative to flu vaccine although he knows it is unlikely SE. He will followup if worsening.

## 2018-11-26 ENCOUNTER — TELEPHONE (OUTPATIENT)
Dept: PRIMARY CARE | Facility: CLINIC | Age: 83
End: 2018-11-26

## 2018-11-26 NOTE — TELEPHONE ENCOUNTER
The patient is requesting a call.  The patient was seen last week by dr link and is having the same problem.  The patient can be reached at [phone].

## 2018-11-27 ENCOUNTER — HOSPITAL ENCOUNTER (OUTPATIENT)
Dept: RADIOLOGY | Facility: CLINIC | Age: 83
Discharge: HOME | End: 2018-11-27
Attending: INTERNAL MEDICINE
Payer: MEDICARE

## 2018-11-27 DIAGNOSIS — R07.9 CHEST PAIN OF UNKNOWN ETIOLOGY: Primary | ICD-10-CM

## 2018-11-27 DIAGNOSIS — R07.9 CHEST PAIN OF UNKNOWN ETIOLOGY: ICD-10-CM

## 2018-11-27 PROCEDURE — 71046 X-RAY EXAM CHEST 2 VIEWS: CPT

## 2018-11-28 NOTE — TELEPHONE ENCOUNTER
Patient called and case discussed 2 days ago.  He is seeing a rheumatologist for evaluation of his strange muscle symptoms.  He had a chest x-ray done which was negative to rule out lung cause for his muscle symptoms.

## 2019-01-07 PROBLEM — I49.49 PREMATURE BEATS: Chronic | Status: ACTIVE | Noted: 2018-06-25

## 2019-01-07 PROBLEM — I10 ESSENTIAL HYPERTENSION: Chronic | Status: ACTIVE | Noted: 2018-06-25

## 2019-01-07 PROBLEM — E78.2 MIXED HYPERLIPIDEMIA: Chronic | Status: ACTIVE | Noted: 2018-06-25

## 2019-01-11 ENCOUNTER — OFFICE VISIT (OUTPATIENT)
Dept: CARDIOLOGY | Facility: CLINIC | Age: 84
End: 2019-01-11
Payer: MEDICARE

## 2019-01-11 VITALS
SYSTOLIC BLOOD PRESSURE: 120 MMHG | WEIGHT: 151 LBS | BODY MASS INDEX: 25.78 KG/M2 | DIASTOLIC BLOOD PRESSURE: 62 MMHG | OXYGEN SATURATION: 91 % | HEIGHT: 64 IN

## 2019-01-11 DIAGNOSIS — I35.1 NONRHEUMATIC AORTIC VALVE INSUFFICIENCY: Primary | Chronic | ICD-10-CM

## 2019-01-11 DIAGNOSIS — I49.49 PREMATURE BEATS: Chronic | ICD-10-CM

## 2019-01-11 DIAGNOSIS — I10 ESSENTIAL HYPERTENSION: Chronic | ICD-10-CM

## 2019-01-11 DIAGNOSIS — E78.2 MIXED HYPERLIPIDEMIA: Chronic | ICD-10-CM

## 2019-01-11 PROCEDURE — 99214 OFFICE O/P EST MOD 30 MIN: CPT | Performed by: INTERNAL MEDICINE

## 2019-01-11 PROCEDURE — 93000 ELECTROCARDIOGRAM COMPLETE: CPT | Performed by: INTERNAL MEDICINE

## 2019-01-11 RX ORDER — SIMVASTATIN 40 MG/1
1 TABLET, FILM COATED ORAL DAILY
Refills: 0 | COMMUNITY
Start: 2018-12-18

## 2019-01-11 ASSESSMENT — ENCOUNTER SYMPTOMS
DECREASED APPETITE: 0
IRREGULAR HEARTBEAT: 0

## 2019-01-11 NOTE — PROGRESS NOTES
Cardiology Consult     Reason for visit: Extrasystoles on EKG.  Chief Complaint   Patient presents with   • Follow-up   • Nonrheumatic Aortic Valve insufficiency   • Atrial Fibrillation       HPI     Efrain Cardona Jr. is a 84 y.o. male  who presents for follow-up to a visit in July at which time he was evaluated for the presence of frequent extrasystoles on EKG.  Of interest, these sensations of all but disappeared.  No other changes in his health.  Otherwise very active.  No chest pain.  Good appetite.  No chills or fever.    He does relate an event that occurred since last seen here.  He underwent a flu shot in the left deltoid.  He had some very mild aches as is typical with flu shots early on.  He then developed severe pain in his upper left arm shoulder and into the scapular region.  A workup ensued including labs which were all normal, chest x-ray and x-rays of the shoulder which revealed no pathology and a formal rheumatologic consultation.  No specific etiology was found and the symptoms have since resolved.    Past Medical History:   Diagnosis Date   • Abnormal EKG    • Arrhythmia    • Chronic kidney disease    • Disease of thyroid gland     1991   • Diverticulitis of colon     Diverticulosis   • GI (gastrointestinal bleed)     X3   last 2015   • Heart murmur    • Hyperlipidemia    • Hypertension    • Infectious viral hepatitis    • Lipid disorder    • Renal failure      Past Surgical History:   Procedure Laterality Date   • HERNIA REPAIR      7/2001   • KIDNEY SURGERY Left     kidney cancer     Griseofulvin and Tetracycline  Current Outpatient Prescriptions   Medication Sig Dispense Refill   • allopurinol (ZYLOPRIM) 100 mg tablet 100 mg 2 (two) times a day. Take 2 tablets once daily      • amLODIPine (NORVASC) 10 mg tablet Take 10 mg by mouth daily.     • amoxicillin (AMOXIL) 500 mg capsule take 4 capsules by mouth 1 hour prior to appointment  0   • ferrous sulfate 325 mg (65 mg iron) ER capsule  Takes as needed when hemoglobin in low      • finasteride (PROSCAR) 5 mg tablet Take 5 mg by mouth daily.     • lisinopril (PRINIVIL) 40 mg tablet 40 mg. Take 1.5 tablets by oral route every day     • metoprolol succinate XL (TOPROL-XL) 25 mg 24 hr tablet 25 mg. Take HALF tablet by oral route every day     • omeprazole (PriLOSEC) 20 mg capsule 20 mg. 1 capsule daily 30 minutes to 1 hour before a meal     • simvastatin (ZOCOR) 40 mg tablet Take 1 tablet by mouth daily.  0     No current facility-administered medications for this visit.      Social History     Social History   • Marital status:      Spouse name: N/A   • Number of children: N/A   • Years of education: N/A     Social History Main Topics   • Smoking status: Former Smoker     Quit date: 1970   • Smokeless tobacco: Never Used      Comment: PIPE    • Alcohol use Yes      Comment: wine, daily   • Drug use: No   • Sexual activity: Defer     Other Topics Concern   • None     Social History Narrative   • None     Family History   Problem Relation Age of Onset   • Lung cancer Mother    • Leukemia Father         Review of Systems   Constitution: Negative for decreased appetite.   Cardiovascular: Negative for chest pain and irregular heartbeat.   Musculoskeletal: Positive for joint pain.   All other systems reviewed and are negative.     Objective   Vitals:    01/11/19 0931   BP: 120/62   SpO2: (!) 91%   Weight 151 pounds    Physical Exam   Constitutional: He is oriented to person, place, and time. He appears well-nourished. No distress.   Pleasant fit appearing man not acutely distressed.   HENT:   Head: Normocephalic and atraumatic.   Eyes: Conjunctivae are normal. No scleral icterus.   Neck: Normal range of motion. Neck supple. No JVD present. No thyromegaly present.   Cardiovascular: Normal rate, regular rhythm and intact distal pulses.  Exam reveals no gallop.    Murmur (Soft systolic murmur along the right sternal border.  No diastolic murmur heard at  rest or provoked with maneuver.) heard.  No orthostatic change of the blood pressure.   Pulmonary/Chest: Effort normal and breath sounds normal. He has no wheezes. He has no rales.   Abdominal: Soft. Bowel sounds are normal. There is no tenderness. There is no rebound.   Musculoskeletal: Normal range of motion. He exhibits no edema, tenderness or deformity.   Mild scoliosis of the lumbar spine.   Neurological: He is alert and oriented to person, place, and time.   Skin: Skin is warm and dry. No rash noted.   Psychiatric: He has a normal mood and affect. His behavior is normal. Judgment and thought content normal.   Nursing note and vitals reviewed.      Labs   Lab Results   Component Value Date    WBC 7.00 11/13/2018    HGB 14.7 11/13/2018    HCT 43.9 11/13/2018     11/13/2018    CHOL 170 09/19/2017    TRIG 36 09/19/2017    HDL 71 09/19/2017    ALT 21 03/20/2018    AST 28 03/20/2018     11/13/2018    K 4.5 11/13/2018     11/13/2018    CREATININE 1.3 11/13/2018    BUN 21 (H) 11/13/2018    CO2 28 11/13/2018    TSH 1.61 11/13/2018    PSA 2.02 08/26/2015    INR 1.0 09/22/2015    HGBA1C 4.8 08/26/2015   Imaging  An echocardiogram that was obtained in October 2018 shows normal left ventricular size and function with preserved ejection fraction.  There is evidence of mild aortic insufficiency.    ECG   Normal sinus rhythm and normal.  Compared with prior tracing, previously seen ectopic complexes are resolved.       Assessment and plan:    1.  Occasional extrasystoles on EKG. Resolved.  We discussed possible etiologies.  He did state that the symptoms are most prominent it appeared of time when he was caring for his brother who is being treated with chemotherapy for cholangiocarcinoma.  His brother eventually succumbed to this illness and it was not too long after that that the symptoms resolved.  He does not have seasonal rhinitis or allergies to suggest this is a provoking factor.  Of interest he does  have an extremely large hiatal hernia which is been known about for some time.  It is certainly possible that this could be an etiology of his intermittent sensation of palpitations.  For now, since they have resolved and in view of the fact that he is a normal EKG, normal exam and a recent echo did not demonstrate any severe pathologic cardiovascular disease we will continue to follow these episodes.    2.  Hypertension outstanding control in the face of his current medications and exercise regimen.  I would not make any changes in medications.  I suggested that he speak to his nephrologist about the possible as needed use of diuretics for the edema created by amlodipine.    3.  Aortic insufficiency.  Very minimal progression in this finding since it was first discovered in 1991.  Not clearly evident on examination certainly he has no symptoms of congestive heart failure.  Continued aggressive treatment of his blood pressure is indicated and I would certainly continue the ACE inhibitor.  Echocardiography could be considered in the next 6-12 months in follow-up to follow this finding.  He will continue to pursue antibiotic endocarditis prophylaxis under appropriate circumstances.    4.  Single kidney status post nephrectomy.  Stable creatinine as of recent laboratory studies.  The creatinine was 1.3.    5.  Gastrointestinal bleeding secondary to aspirin.  No clear etiology found despite aggressive GI workup.  He is off aspirin and his hemoglobin is remained stable.    6.  History of thyroiditis.  Recent laboratory studies reveal normal TSH.    In summary then, I believe  shannonjoys overall excellent health and certainly excellent cardiovascular health.  He is committed to exercise and I see no reason to limit her changes program.      I hope to see him in 6 months in follow-up to today's visit but will be happy to see him sooner.  I certainly encouraged him to call me if there is any change in the pattern of  palpitations or other symptoms that may be related to a change in his cardiovascular status.    Timoteo Kim MD  1/11/2019

## 2019-01-11 NOTE — LETTER
January 11, 2019     Alex Elliott MD  [address] [facility] [address] [facility] [address]  [place] [address]    Patient: Efrain Cardona Jr.   YOB: 1934   Date of Visit: 1/11/2019       Dear Dr. Elliott:    Thank you for referring Efrain Cardona to me for evaluation. Below are my notes for this consultation.    If you have questions, please do not hesitate to call me. I look forward to following your patient along with you.         Sincerely,        Timoteo Kim MD        CC: MD Judy Estes, Timoteo WAN MD  1/11/2019 10:22 AM  Sign at close encounter      Cardiology Consult     Reason for visit: Extrasystoles on EKG.  Chief Complaint   Patient presents with   • Follow-up   • Nonrheumatic Aortic Valve insufficiency   • Atrial Fibrillation       HPI     Efrain Cardona Jr. is a 84 y.o. male  who presents for follow-up to a visit in July at which time he was evaluated for the presence of frequent extrasystoles on EKG.  Of interest, these sensations of all but disappeared.  No other changes in his health.  Otherwise very active.  No chest pain.  Good appetite.  No chills or fever.    He does relate an event that occurred since last seen here.  He underwent a flu shot in the left deltoid.  He had some very mild aches as is typical with flu shots early on.  He then developed severe pain in his upper left arm shoulder and into the scapular region.  A workup ensued including labs which were all normal, chest x-ray and x-rays of the shoulder which revealed no pathology and a formal rheumatologic consultation.  No specific etiology was found and the symptoms have since resolved.    Past Medical History:   Diagnosis Date   • Abnormal EKG    • Arrhythmia    • Chronic kidney disease    • Disease of thyroid gland     1991   • Diverticulitis of colon     Diverticulosis   • GI (gastrointestinal bleed)     X3   last 2015   • Heart murmur    • Hyperlipidemia    • Hypertension    • Infectious viral hepatitis    •  Lipid disorder    • Renal failure      Past Surgical History:   Procedure Laterality Date   • HERNIA REPAIR      7/2001   • KIDNEY SURGERY Left     kidney cancer     Griseofulvin and Tetracycline  Current Outpatient Prescriptions   Medication Sig Dispense Refill   • allopurinol (ZYLOPRIM) 100 mg tablet 100 mg 2 (two) times a day. Take 2 tablets once daily      • amLODIPine (NORVASC) 10 mg tablet Take 10 mg by mouth daily.     • amoxicillin (AMOXIL) 500 mg capsule take 4 capsules by mouth 1 hour prior to appointment  0   • ferrous sulfate 325 mg (65 mg iron) ER capsule Takes as needed when hemoglobin in low      • finasteride (PROSCAR) 5 mg tablet Take 5 mg by mouth daily.     • lisinopril (PRINIVIL) 40 mg tablet 40 mg. Take 1.5 tablets by oral route every day     • metoprolol succinate XL (TOPROL-XL) 25 mg 24 hr tablet 25 mg. Take HALF tablet by oral route every day     • omeprazole (PriLOSEC) 20 mg capsule 20 mg. 1 capsule daily 30 minutes to 1 hour before a meal     • simvastatin (ZOCOR) 40 mg tablet Take 1 tablet by mouth daily.  0     No current facility-administered medications for this visit.      Social History     Social History   • Marital status:      Spouse name: N/A   • Number of children: N/A   • Years of education: N/A     Social History Main Topics   • Smoking status: Former Smoker     Quit date: 1970   • Smokeless tobacco: Never Used      Comment: PIPE    • Alcohol use Yes      Comment: wine, daily   • Drug use: No   • Sexual activity: Defer     Other Topics Concern   • None     Social History Narrative   • None     Family History   Problem Relation Age of Onset   • Lung cancer Mother    • Leukemia Father         Review of Systems   Constitution: Negative for decreased appetite.   Cardiovascular: Negative for chest pain and irregular heartbeat.   Musculoskeletal: Positive for joint pain.   All other systems reviewed and are negative.     Objective   Vitals:    01/11/19 0931   BP: 120/62    SpO2: (!) 91%   Weight 151 pounds    Physical Exam   Constitutional: He is oriented to person, place, and time. He appears well-nourished. No distress.   Pleasant fit appearing man not acutely distressed.   HENT:   Head: Normocephalic and atraumatic.   Eyes: Conjunctivae are normal. No scleral icterus.   Neck: Normal range of motion. Neck supple. No JVD present. No thyromegaly present.   Cardiovascular: Normal rate, regular rhythm and intact distal pulses.  Exam reveals no gallop.    Murmur (Soft systolic murmur along the right sternal border.  No diastolic murmur heard at rest or provoked with maneuver.) heard.  No orthostatic change of the blood pressure.   Pulmonary/Chest: Effort normal and breath sounds normal. He has no wheezes. He has no rales.   Abdominal: Soft. Bowel sounds are normal. There is no tenderness. There is no rebound.   Musculoskeletal: Normal range of motion. He exhibits no edema, tenderness or deformity.   Mild scoliosis of the lumbar spine.   Neurological: He is alert and oriented to person, place, and time.   Skin: Skin is warm and dry. No rash noted.   Psychiatric: He has a normal mood and affect. His behavior is normal. Judgment and thought content normal.   Nursing note and vitals reviewed.      Labs   Lab Results   Component Value Date    WBC 7.00 11/13/2018    HGB 14.7 11/13/2018    HCT 43.9 11/13/2018     11/13/2018    CHOL 170 09/19/2017    TRIG 36 09/19/2017    HDL 71 09/19/2017    ALT 21 03/20/2018    AST 28 03/20/2018     11/13/2018    K 4.5 11/13/2018     11/13/2018    CREATININE 1.3 11/13/2018    BUN 21 (H) 11/13/2018    CO2 28 11/13/2018    TSH 1.61 11/13/2018    PSA 2.02 08/26/2015    INR 1.0 09/22/2015    HGBA1C 4.8 08/26/2015   Imaging  An echocardiogram that was obtained in October 2018 shows normal left ventricular size and function with preserved ejection fraction.  There is evidence of mild aortic insufficiency.    ECG   Normal sinus rhythm and normal.   Compared with prior tracing, previously seen ectopic complexes are resolved.       Assessment and plan:    1.  Occasional extrasystoles on EKG. Resolved.  We discussed possible etiologies.  He did state that the symptoms are most prominent it appeared of time when he was caring for his brother who is being treated with chemotherapy for cholangiocarcinoma.  His brother eventually succumbed to this illness and it was not too long after that that the symptoms resolved.  He does not have seasonal rhinitis or allergies to suggest this is a provoking factor.  Of interest he does have an extremely large hiatal hernia which is been known about for some time.  It is certainly possible that this could be an etiology of his intermittent sensation of palpitations.  For now, since they have resolved and in view of the fact that he is a normal EKG, normal exam and a recent echo did not demonstrate any severe pathologic cardiovascular disease we will continue to follow these episodes.    2.  Hypertension outstanding control in the face of his current medications and exercise regimen.  I would not make any changes in medications.  I suggested that he speak to his nephrologist about the possible as needed use of diuretics for the edema created by amlodipine.    3.  Aortic insufficiency.  Very minimal progression in this finding since it was first discovered in 1991.  Not clearly evident on examination certainly he has no symptoms of congestive heart failure.  Continued aggressive treatment of his blood pressure is indicated and I would certainly continue the ACE inhibitor.  Echocardiography could be considered in the next 6-12 months in follow-up to follow this finding.  He will continue to pursue antibiotic endocarditis prophylaxis under appropriate circumstances.    4.  Single kidney status post nephrectomy.  Stable creatinine as of recent laboratory studies.  The creatinine was 1.3.    5.  Gastrointestinal bleeding secondary to  aspirin.  No clear etiology found despite aggressive GI workup.  He is off aspirin and his hemoglobin is remained stable.    6.  History of thyroiditis.  Recent laboratory studies reveal normal TSH.    In summary then, I believe  shannonjoys overall excellent health and certainly excellent cardiovascular health.  He is committed to exercise and I see no reason to limit her changes program.      I hope to see him in 6 months in follow-up to today's visit but will be happy to see him sooner.  I certainly encouraged him to call me if there is any change in the pattern of palpitations or other symptoms that may be related to a change in his cardiovascular status.    Timoteo Kim MD  1/11/2019

## 2019-01-30 ENCOUNTER — TRANSCRIBE ORDERS (OUTPATIENT)
Dept: SCHEDULING | Age: 84
End: 2019-01-30

## 2019-01-30 DIAGNOSIS — M85.80 OTHER SPECIFIED DISORDERS OF BONE DENSITY AND STRUCTURE, UNSPECIFIED SITE: Primary | ICD-10-CM

## 2019-03-04 ENCOUNTER — TRANSCRIBE ORDERS (OUTPATIENT)
Dept: SCHEDULING | Age: 84
End: 2019-03-04

## 2019-03-04 ENCOUNTER — OFFICE VISIT (OUTPATIENT)
Dept: PRIMARY CARE | Facility: CLINIC | Age: 84
End: 2019-03-04
Payer: MEDICARE

## 2019-03-04 VITALS
SYSTOLIC BLOOD PRESSURE: 136 MMHG | BODY MASS INDEX: 25.95 KG/M2 | WEIGHT: 152 LBS | TEMPERATURE: 97.9 F | OXYGEN SATURATION: 97 % | HEIGHT: 64 IN | DIASTOLIC BLOOD PRESSURE: 78 MMHG | HEART RATE: 60 BPM

## 2019-03-04 DIAGNOSIS — N18.30 CKD (CHRONIC KIDNEY DISEASE) STAGE 3, GFR 30-59 ML/MIN (CMS/HCC): Primary | ICD-10-CM

## 2019-03-04 DIAGNOSIS — E78.2 MIXED HYPERLIPIDEMIA: Chronic | ICD-10-CM

## 2019-03-04 DIAGNOSIS — D50.9 IRON DEFICIENCY ANEMIA, UNSPECIFIED IRON DEFICIENCY ANEMIA TYPE: ICD-10-CM

## 2019-03-04 DIAGNOSIS — M10.9 GOUT, UNSPECIFIED CAUSE, UNSPECIFIED CHRONICITY, UNSPECIFIED SITE: ICD-10-CM

## 2019-03-04 DIAGNOSIS — M81.8 OTHER OSTEOPOROSIS WITHOUT CURRENT PATHOLOGICAL FRACTURE: Primary | ICD-10-CM

## 2019-03-04 DIAGNOSIS — I10 ESSENTIAL HYPERTENSION: Chronic | ICD-10-CM

## 2019-03-04 PROCEDURE — 99214 OFFICE O/P EST MOD 30 MIN: CPT | Performed by: INTERNAL MEDICINE

## 2019-03-04 RX ORDER — HYDROCHLOROTHIAZIDE 12.5 MG/1
12.5 TABLET ORAL DAILY
COMMUNITY
End: 2020-07-24

## 2019-03-04 ASSESSMENT — ENCOUNTER SYMPTOMS
CHILLS: 0
ABDOMINAL PAIN: 0
COUGH: 0
FEVER: 0
SHORTNESS OF BREATH: 0
HEMATURIA: 0
PALPITATIONS: 0

## 2019-03-04 NOTE — ASSESSMENT & PLAN NOTE
Patient's renal function was much improved on last check.  However his nephrologist started him on hydrochlorothiazide 25 mg Monday Wednesday Friday and I suspect his creatinine will be drifting up.  Check CMP today.

## 2019-03-04 NOTE — ASSESSMENT & PLAN NOTE
Blood pressure is improved on hydrochlorothiazide 25 mg Monday Wednesday Friday and reduced dosage of Norvasc 5 mg.  He is also on low-dose Toprol which might be increased although the question whether his heart rate would tolerate it and lisinopril 30.

## 2019-03-04 NOTE — PROGRESS NOTES
Alex Elliott MD  Geriatric Medicine                                                    GERIATRIC MEDICINE    Subjective      Patient ID: Efrain Cardona Jr. is a 84 y.o. male.    Disease Management Visit    The patient is here for Disease Management for evaluation and management of the following chronic medical problems.    This includes: symptom review, medication regimen review, laboratory monitoring, and review of diagnostic testing.    Note: Premature beats have resolved.    Hypertension  --- Update 3/4/18: Blood pressure stable with medication change with reduction of Norvasc to 5 mg in addition of hydrochlorothiazide 25 mg Monday Wednesday Friday.  --- Update 6/25/18: Stable.  ---update 2/26/18: Stable; still occasional edema on hot days from amlodipine but he is tolerating it.  ---Update 10/30/17: home BP stable.  --- Update 7/5/17: Home BP tracking stable--however he reports some variability  --- Previous visits: BP is stable on dose reduction of Norvasc and a small dose of Toprol --summertime edema from Norvasc.  --- Previous visit:Patient notes that his home blood pressure monitoring is all from 10-20 points higher than what we check in the office.  He reports running in the systolic range of 125-140 at home.  --- Patient asked about switching from Vasotec to lisinopril do to major cost advantages.  He also notes summertime edema from Norvasc.  Medications  Toprol 12.5 mg daily  Norvasc 5 mg daily    -dose reduced due to venous insufficiency and edema  Lisinopril 30 mg daily  Hydrochlorothiazide 25 mg Monday Wednesday Friday  -added back on by nephrologist       (((   ASA   )))  --stopped due to study from [place] recommending against ASA for primary prevention    Anemia  -He has recurrent iron deficiency anemia.  --- Update 3/4/19: Hemoglobin was normal on last check 11/13/18 at 14.7  --- Update 6/25/18: Stable.  Recent hemoglobin has been normal most recently 3/20/18 was 14.5.  This is normalized  since he stopped aspirin.  ---update 2/26/18: EGD done by GI this summer was negative.He is due for follow-up CBC next month.  Already ordered.  --- Update 7/5/17: Patient has had no signs of GI bleeding.  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  ---His recent hemoglobin was 9/19/17 Hgb 14.3  -off ASA  2/15/17 hemoglobin was 13.1  8/18/16 was 14.1  5/25/16 was 12.8  12/30/15 was 14.1  6/30/15 was 13.9  ---Previous visit:  He has since stopped his iron for his capsule endoscopy study which was done approximately 3 weeks ago to evaluate colonic and gastric AVMs as well as the small bowel for source of blood loss.----Patient reports that his capsule endoscopy study was negative. GI recommended stopping aspirin.  ---His gastroenterologist feels this is most likely do to either gastric or colonic AVMs  Last EGD and colonoscopy were done in July 2014.    Hypercholesterolemia  -Goal of treatment is LDL around 100  Primary prevention strategy  Last lipids  9/19/17:    HDL 71  LDL 92  2/15/17: Total cholesterol 151 HDL 69 LDL 77  5/25/16: Total cholesterol 168 HDL 72 LDL 81  8/26/15: Total cholesterol 198 HDL 71   11/24/14:    HDL 79  LDL 84  Medications  Zocor 40 mg daily    GERD  Medications  Prilosec 20 mg daily    BPH  Medication  Proscar 5 mg daily    Gout  --- Update 2/26/18: No recent attacks of gout  Last uric acid 9/19/17 was 4.6  6/30/15 was 4.1  Medications  Allopurinol 200 mg daily  (Prednisone 20-30 mg when necessary with a 2-3 day taper for acute attacks--- patient initiates treatment himself with this regimen)    Chronic kidney disease  ---s/p Left Nephrectomy 2006  -Transitional Cell Ca  Creatinine ranges 1.4-1.8  Most recent creatinine on 11/13/18 was 1.3  -this is the best it has been in years.  3/20/18 was 1.5  6/29/17 was 1.6  2/15/17 was 1.6  8/18/16 was 1.6  5/25/16 was 1.7  12/30/15 was 1.4  4/1/15  was 1.6    History of colon polyp    History  of intermittent diverticulitis  -Treated with Augmentin or goes to Cipro/Flagyl if persistent symptoms    Low back pain  -Chronic degenerative scoliosis    -------------------    Acute problem August 2016 -- diplopia  Progress note:  Over the last 6 months she has had at least 3 episodes of very brief diplopia lasting 1-2 minutes.  He reports if he shuts either eye the sensation is eliminated. With both eyes open it occurs.  This resolves spontaneously within 2 minutes.  Patient denies any other associated neurological symptoms including: He has not had weakness, fatigue, dizziness, speech trouble, etc....  Follow-up: Patient saw his ophthalmologist who examined him and ordered an MRI of the brain which was unremarkable and carotid Doppler study which was normal. He suspected that he had transient extraocular muscle weakness for unclear reasons.  He has not had a recurrence of his symptoms since July 2016.    Hosp ER Eval  9/22/15:  non-spec ABD Pain w normal CT ABD.    -------------------    Preventive health care and Screening summary:   UPDATE  3/6/17  Colonoscopy  -August 2014  Flu vaccine  2014; 2015 HD  -pharm 12/16/15 for HD; high-dose 12/5/16  Pneumovax  Booster 2011; Prevnar 13 given 2/23/15  dT Booster  Tdap March 2012  Zostavax  --patient did not get this from the pharmacy as ordered and now he wants to await the second generation Zostavax from a different  which may have better efficacy--possibly coming later in 2017  PSA  -2.02  8/26/15--note patient is now over the age of 81 so that screening PSA no longer recommended        The following have been reviewed and updated as appropriate in this visit:         Past Medical History:   Diagnosis Date   • Abnormal EKG    • Arrhythmia    • Chronic kidney disease    • Disease of thyroid gland     1991   • Diverticulitis of colon     Diverticulosis   • GI (gastrointestinal bleed)     X3   last 2015   • Heart murmur    • Hyperlipidemia    •  Hypertension    • Infectious viral hepatitis    • Lipid disorder    • Renal failure        Past Surgical History:   Procedure Laterality Date   • HERNIA REPAIR      7/2001   • KIDNEY SURGERY Left     kidney cancer       Family History   Problem Relation Age of Onset   • Lung cancer Mother    • Leukemia Father        Social History     Social History   • Marital status:      Spouse name: N/A   • Number of children: N/A   • Years of education: N/A     Occupational History   • Not on file.     Social History Main Topics   • Smoking status: Former Smoker     Quit date: 1970   • Smokeless tobacco: Never Used      Comment: PIPE    • Alcohol use Yes      Comment: wine, daily   • Drug use: No   • Sexual activity: Defer     Other Topics Concern   • Not on file     Social History Narrative   • No narrative on file       Allergies   Allergen Reactions   • Griseofulvin      Other reaction(s): UNKNOWN  fulvicin    • Tetracycline Rash and Other (see comments)     minocin        Current Outpatient Prescriptions   Medication Sig Dispense Refill   • allopurinol (ZYLOPRIM) 100 mg tablet 100 mg 2 (two) times a day. Take 2 tablets once daily      • amLODIPine (NORVASC) 10 mg tablet Take 5 mg by mouth daily.       • finasteride (PROSCAR) 5 mg tablet Take 5 mg by mouth daily.     • hydrochlorothiazide (HYDRODIURIL) 25 mg tablet Take 25 mg by mouth 3 (three) times a week (Mon, Wed, Fri).     • lisinopril (PRINIVIL) 40 mg tablet 40 mg. Take 1.5 tablets by oral route every day     • metoprolol succinate XL (TOPROL-XL) 25 mg 24 hr tablet 25 mg. Take HALF tablet by oral route every day     • omeprazole (PriLOSEC) 20 mg capsule 20 mg. 1 capsule daily 30 minutes to 1 hour before a meal     • simvastatin (ZOCOR) 40 mg tablet Take 1 tablet by mouth daily.  0   • amoxicillin (AMOXIL) 500 mg capsule take 4 capsules by mouth 1 hour prior to appointment  0   • ferrous sulfate 325 mg (65 mg iron) ER capsule Takes as needed when hemoglobin in  low        No current facility-administered medications for this visit.        Review of Systems   Constitutional: Negative for chills and fever.   Respiratory: Negative for cough and shortness of breath.    Cardiovascular: Negative for chest pain and palpitations.        Palpitations and premature beats have resolved completely it appears.   Gastrointestinal: Negative for abdominal pain.   Genitourinary: Negative for hematuria.       Objective     Vitals:    03/04/19 1131   BP: 136/78   Pulse: 60   Temp: 36.6 °C (97.9 °F)   SpO2: 97%       Physical Exam   Constitutional: He appears well-developed and well-nourished.   HENT:   Head: Normocephalic.   Cardiovascular: Normal rate, regular rhythm and normal heart sounds.    Pulmonary/Chest: Effort normal and breath sounds normal. No respiratory distress. He has no wheezes. He has no rales.   Abdominal: Soft. Bowel sounds are normal. There is no tenderness.   Musculoskeletal: He exhibits no edema.   Neurological: He is alert.       Lab Results   Component Value Date    WBC 7.00 11/13/2018    HGB 14.7 11/13/2018    HCT 43.9 11/13/2018    .1 (H) 11/13/2018     11/13/2018         Chemistry        Component Value Date/Time     11/13/2018 1533    K 4.5 11/13/2018 1533     11/13/2018 1533    CO2 28 11/13/2018 1533    BUN 21 (H) 11/13/2018 1533    CREATININE 1.3 11/13/2018 1533        Component Value Date/Time    CALCIUM 9.1 11/13/2018 1533    ALKPHOS 59 03/20/2018 1247    AST 28 03/20/2018 1247    ALT 21 03/20/2018 1247    BILITOT 0.9 03/20/2018 1247            Lab Results   Component Value Date    CHOL 170 09/19/2017    CHOL 151 02/15/2017    CHOL 160 05/25/2016     Lab Results   Component Value Date    HDL 71 09/19/2017    HDL 69 02/15/2017    HDL 72 05/25/2016     Lab Results   Component Value Date    LDLCALC 92 09/19/2017    LDLCALC 77 02/15/2017    LDLCALC 81 05/25/2016     Lab Results   Component Value Date    TRIG 36 09/19/2017    TRIG 27 (L)  02/15/2017    TRIG 35 05/25/2016     No results found for: CHOLHDL    Lab Results   Component Value Date    TSH 1.61 11/13/2018       Lab Results   Component Value Date    HGBA1C 4.8 08/26/2015       No results found for: HAV, HEPAIGM, HEPBIGM, HEPBCAB, HBEAG, HEPCAB    No results found for: MICROALBUR, [id number]    Assessment/Plan   1. CKD (chronic kidney disease) stage 3, GFR 30-59 ml/min (CMS/HCC) ([facility])    2. Essential hypertension    3. Mixed hyperlipidemia    4. Gout, unspecified cause, unspecified chronicity, unspecified site    5. Iron deficiency anemia, unspecified iron deficiency anemia type      Problem List Items Addressed This Visit     Essential hypertension (Chronic)    Current Assessment & Plan     Blood pressure is improved on hydrochlorothiazide 25 mg Monday Wednesday Friday and reduced dosage of Norvasc 5 mg.  He is also on low-dose Toprol which might be increased although the question whether his heart rate would tolerate it and lisinopril 30.         Relevant Medications    hydrochlorothiazide (HYDRODIURIL) 25 mg tablet    CKD (chronic kidney disease) stage 3, GFR 30-59 ml/min (CMS/HCC) ([facility]) - Primary    Current Assessment & Plan     Patient's renal function was much improved on last check.  However his nephrologist started him on hydrochlorothiazide 25 mg Monday Wednesday Friday and I suspect his creatinine will be drifting up.  Check CMP today.         Relevant Medications    hydrochlorothiazide (HYDRODIURIL) 25 mg tablet    Other Relevant Orders    Comprehensive metabolic panel    Magnesium    Mixed hyperlipidemia (Chronic)    Current Assessment & Plan     Stable.  Due for lipid profile check and able to do this.         Relevant Orders    Lipid panel    Gout    Current Assessment & Plan     Stable without recent gout attack despite hydrochlorothiazide treatment.  Check uric acid level.         Relevant Orders    Uric acid    Iron deficiency anemia    Current Assessment & Plan     Anemia has  resolved as of last CBC check but will check again today.         Relevant Orders    CBC          No Follow-up on file.    Orders Placed This Encounter   Procedures   • Comprehensive metabolic panel     Standing Status:   Future     Standing Expiration Date:   3/4/2020     Order Specific Question:   Has the patient fasted?     Answer:   Yes   • CBC     Standing Status:   Future     Standing Expiration Date:   3/4/2020   • Magnesium     Standing Status:   Future     Standing Expiration Date:   3/4/2020     Order Specific Question:   Has the patient fasted?     Answer:   Yes   • Uric acid     Standing Status:   Future     Standing Expiration Date:   3/4/2020     Order Specific Question:   Has the patient fasted?     Answer:   Yes   • Lipid panel     Standing Status:   Future     Standing Expiration Date:   3/4/2020     Order Specific Question:   Has the patient fasted?     Answer:   Yes         Alex Elliott MD  3/4/2019

## 2019-03-12 ENCOUNTER — HOSPITAL ENCOUNTER (OUTPATIENT)
Dept: RADIOLOGY | Facility: CLINIC | Age: 84
Discharge: HOME | End: 2019-03-12
Attending: INTERNAL MEDICINE
Payer: MEDICARE

## 2019-03-12 DIAGNOSIS — M81.8 OTHER OSTEOPOROSIS WITHOUT CURRENT PATHOLOGICAL FRACTURE: ICD-10-CM

## 2019-03-12 PROCEDURE — 77080 DXA BONE DENSITY AXIAL: CPT

## 2019-04-18 ENCOUNTER — APPOINTMENT (OUTPATIENT)
Dept: LAB | Facility: CLINIC | Age: 84
End: 2019-04-18
Attending: INTERNAL MEDICINE
Payer: MEDICARE

## 2019-04-18 DIAGNOSIS — D50.9 IRON DEFICIENCY ANEMIA, UNSPECIFIED IRON DEFICIENCY ANEMIA TYPE: ICD-10-CM

## 2019-04-18 DIAGNOSIS — N18.30 CKD (CHRONIC KIDNEY DISEASE) STAGE 3, GFR 30-59 ML/MIN (CMS/HCC): ICD-10-CM

## 2019-04-18 DIAGNOSIS — E78.2 MIXED HYPERLIPIDEMIA: Chronic | ICD-10-CM

## 2019-04-18 DIAGNOSIS — M10.9 GOUT, UNSPECIFIED CAUSE, UNSPECIFIED CHRONICITY, UNSPECIFIED SITE: ICD-10-CM

## 2019-04-18 LAB
ALBUMIN SERPL-MCNC: 3.9 G/DL (ref 3.4–5)
ALP SERPL-CCNC: 64 IU/L (ref 35–126)
ALT SERPL-CCNC: 16 IU/L (ref 16–63)
ANION GAP SERPL CALC-SCNC: 12 MEQ/L (ref 3–15)
AST SERPL-CCNC: 19 IU/L (ref 15–41)
BILIRUB SERPL-MCNC: 1.1 MG/DL (ref 0.3–1.2)
BUN SERPL-MCNC: 32 MG/DL (ref 8–20)
CALCIUM SERPL-MCNC: 9.2 MG/DL (ref 8.9–10.3)
CHLORIDE SERPL-SCNC: 101 MEQ/L (ref 98–109)
CHOLEST SERPL-MCNC: 172 MG/DL
CO2 SERPL-SCNC: 24 MEQ/L (ref 22–32)
CREAT SERPL-MCNC: 1.9 MG/DL
ERYTHROCYTE [DISTWIDTH] IN BLOOD BY AUTOMATED COUNT: 15.9 % (ref 11.6–14.4)
GFR SERPL CREATININE-BSD FRML MDRD: 34 ML/MIN/1.73M*2
GLUCOSE SERPL-MCNC: 95 MG/DL (ref 70–99)
HCT VFR BLDCO AUTO: 40.5 %
HDLC SERPL-MCNC: 52 MG/DL
HDLC SERPL: 3.3 {RATIO}
HGB BLD-MCNC: 13.5 G/DL
LDLC SERPL CALC-MCNC: 110 MG/DL
MAGNESIUM SERPL-MCNC: 1.4 MG/DL (ref 1.8–2.5)
MCH RBC QN AUTO: 34 PG (ref 28–33.2)
MCHC RBC AUTO-ENTMCNC: 33.3 G/DL (ref 32.2–36.5)
MCV RBC AUTO: 102 FL (ref 83–98)
NONHDLC SERPL-MCNC: 120 MG/DL
PDW BLD AUTO: 11.1 FL (ref 9.4–12.4)
PLATELET # BLD AUTO: 142 K/UL
POTASSIUM SERPL-SCNC: 4.6 MEQ/L (ref 3.6–5.1)
PROT SERPL-MCNC: 6 G/DL (ref 6–8.2)
RBC # BLD AUTO: 3.97 M/UL (ref 4.5–5.8)
SODIUM SERPL-SCNC: 137 MEQ/L (ref 136–144)
TRIGL SERPL-MCNC: 52 MG/DL (ref 30–149)
URATE SERPL-MCNC: 6.4 MG/DL (ref 4.8–8.2)
WBC # BLD AUTO: 6.99 K/UL

## 2019-04-18 PROCEDURE — 85027 COMPLETE CBC AUTOMATED: CPT

## 2019-04-18 PROCEDURE — 80061 LIPID PANEL: CPT

## 2019-04-18 PROCEDURE — 84550 ASSAY OF BLOOD/URIC ACID: CPT

## 2019-04-18 PROCEDURE — 80053 COMPREHEN METABOLIC PANEL: CPT

## 2019-04-18 PROCEDURE — 83735 ASSAY OF MAGNESIUM: CPT

## 2019-04-18 PROCEDURE — 36415 COLL VENOUS BLD VENIPUNCTURE: CPT

## 2019-04-22 ENCOUNTER — TELEPHONE (OUTPATIENT)
Dept: PRIMARY CARE | Facility: CLINIC | Age: 84
End: 2019-04-22

## 2019-04-22 ENCOUNTER — DOCUMENTATION (OUTPATIENT)
Dept: PRIMARY CARE | Facility: CLINIC | Age: 84
End: 2019-04-22

## 2019-04-22 DIAGNOSIS — N18.30 CKD (CHRONIC KIDNEY DISEASE) STAGE 3, GFR 30-59 ML/MIN (CMS/HCC): ICD-10-CM

## 2019-04-22 DIAGNOSIS — E83.42 HYPOMAGNESEMIA: Primary | ICD-10-CM

## 2019-05-16 ENCOUNTER — APPOINTMENT (OUTPATIENT)
Dept: LAB | Facility: CLINIC | Age: 84
End: 2019-05-16
Attending: INTERNAL MEDICINE
Payer: MEDICARE

## 2019-05-16 DIAGNOSIS — N18.30 CKD (CHRONIC KIDNEY DISEASE) STAGE 3, GFR 30-59 ML/MIN (CMS/HCC): ICD-10-CM

## 2019-05-16 DIAGNOSIS — E83.42 HYPOMAGNESEMIA: ICD-10-CM

## 2019-05-16 LAB
ANION GAP SERPL CALC-SCNC: 10 MEQ/L (ref 3–15)
BUN SERPL-MCNC: 31 MG/DL (ref 8–20)
CALCIUM SERPL-MCNC: 9.5 MG/DL (ref 8.9–10.3)
CHLORIDE SERPL-SCNC: 105 MEQ/L (ref 98–109)
CO2 SERPL-SCNC: 25 MEQ/L (ref 22–32)
CREAT SERPL-MCNC: 1.8 MG/DL
GFR SERPL CREATININE-BSD FRML MDRD: 36.2 ML/MIN/1.73M*2
GLUCOSE SERPL-MCNC: 81 MG/DL (ref 70–99)
MAGNESIUM SERPL-MCNC: 1.4 MG/DL (ref 1.8–2.5)
POTASSIUM SERPL-SCNC: 4.7 MEQ/L (ref 3.6–5.1)
SODIUM SERPL-SCNC: 140 MEQ/L (ref 136–144)

## 2019-05-16 PROCEDURE — 36415 COLL VENOUS BLD VENIPUNCTURE: CPT

## 2019-05-16 PROCEDURE — 80048 BASIC METABOLIC PNL TOTAL CA: CPT

## 2019-05-16 PROCEDURE — 83735 ASSAY OF MAGNESIUM: CPT

## 2019-05-23 ENCOUNTER — TELEPHONE (OUTPATIENT)
Dept: PRIMARY CARE | Facility: CLINIC | Age: 84
End: 2019-05-23

## 2019-05-23 NOTE — TELEPHONE ENCOUNTER
----- Message from Alex Elliott MD sent at 5/22/2019  2:23 PM EDT -----  Please call patient Dr. Cardona that his laboratory results were good renal function reasonably stable.  Magnesium slightly low.  I would recommend he get an over-the-counter magnesium supplement and use once daily.  Thanks

## 2019-05-24 ENCOUNTER — TELEPHONE (OUTPATIENT)
Dept: PRIMARY CARE | Facility: CLINIC | Age: 84
End: 2019-05-24

## 2019-05-24 NOTE — TELEPHONE ENCOUNTER
Left mess early in the week to call office re labs-  Left another mess re labs and recommendation today

## 2019-07-08 ENCOUNTER — TELEPHONE (OUTPATIENT)
Dept: PRIMARY CARE | Facility: CLINIC | Age: 84
End: 2019-07-08

## 2019-07-08 NOTE — TELEPHONE ENCOUNTER
Patient called to report he was stuck with a dirty stick outdoors. It's not a deep puncture, patient washed with soap and water. He was calling to see when his last tetanus shot was. 03/2012.    Do you recommend he get a booster? Or is he ok?

## 2019-07-09 NOTE — TELEPHONE ENCOUNTER
Please call Pt that we have he had Tdap vaccine March 2012.  That is within 10 years but not within 5.  We can give him a booster tomorrow.  Thanks

## 2019-07-10 ENCOUNTER — OFFICE VISIT (OUTPATIENT)
Dept: PRIMARY CARE | Facility: CLINIC | Age: 84
End: 2019-07-10
Payer: MEDICARE

## 2019-07-10 VITALS
HEIGHT: 64 IN | DIASTOLIC BLOOD PRESSURE: 74 MMHG | HEART RATE: 63 BPM | BODY MASS INDEX: 24.89 KG/M2 | WEIGHT: 145.8 LBS | TEMPERATURE: 97.4 F | OXYGEN SATURATION: 95 % | SYSTOLIC BLOOD PRESSURE: 122 MMHG | RESPIRATION RATE: 14 BRPM

## 2019-07-10 DIAGNOSIS — E78.2 MIXED HYPERLIPIDEMIA: Chronic | ICD-10-CM

## 2019-07-10 DIAGNOSIS — S61.432A PUNCTURE WOUND OF LEFT HAND WITHOUT FOREIGN BODY, INITIAL ENCOUNTER: Primary | ICD-10-CM

## 2019-07-10 DIAGNOSIS — E83.42 HYPOMAGNESEMIA: ICD-10-CM

## 2019-07-10 DIAGNOSIS — N18.30 CKD (CHRONIC KIDNEY DISEASE) STAGE 3, GFR 30-59 ML/MIN (CMS/HCC): ICD-10-CM

## 2019-07-10 DIAGNOSIS — I10 ESSENTIAL HYPERTENSION: Chronic | ICD-10-CM

## 2019-07-10 DIAGNOSIS — D50.9 IRON DEFICIENCY ANEMIA, UNSPECIFIED IRON DEFICIENCY ANEMIA TYPE: ICD-10-CM

## 2019-07-10 PROCEDURE — 90715 TDAP VACCINE 7 YRS/> IM: CPT | Mod: AT | Performed by: INTERNAL MEDICINE

## 2019-07-10 PROCEDURE — 90471 IMMUNIZATION ADMIN: CPT | Mod: AT | Performed by: INTERNAL MEDICINE

## 2019-07-10 PROCEDURE — 99214 OFFICE O/P EST MOD 30 MIN: CPT | Mod: 25 | Performed by: INTERNAL MEDICINE

## 2019-07-10 RX ORDER — DIPHENOXYLATE HYDROCHLORIDE AND ATROPINE SULFATE 2.5; .025 MG/1; MG/1
1 TABLET ORAL DAILY PRN
Qty: 30 TABLET | Refills: 1 | Status: SHIPPED | OUTPATIENT
Start: 2019-07-10 | End: 2024-05-15 | Stop reason: SDUPTHER

## 2019-07-10 ASSESSMENT — ENCOUNTER SYMPTOMS
ABDOMINAL PAIN: 0
HEMATURIA: 0
SHORTNESS OF BREATH: 0
PALPITATIONS: 0
FEVER: 0
COUGH: 0
CHILLS: 0

## 2019-07-10 NOTE — ASSESSMENT & PLAN NOTE
No evidence of recurrent GI bleeding.  Last hemoglobin was normal.  Check again with blood work in September.

## 2019-07-10 NOTE — ASSESSMENT & PLAN NOTE
Patient has a minor puncture wound of his left hand with a dirty stick.  Classify this wound is a dirty wound because of his so will impregnated stick and puncture in nature.  It has been 7 years since his last tetanus vaccination recommend vaccination per guidelines with Tdap today which we have in stock.  This should be covered by Medicare as it is a vaccination related to a wound.

## 2019-07-10 NOTE — PROGRESS NOTES
Alex Elliott MD  Geriatric Medicine                                                    GERIATRIC MEDICINE    Subjective      Patient ID: Efrain Cardona Jr. is a 85 y.o. male.    Disease Management Visit    The patient is here for an acute problem and for disease Management for evaluation and management of the following chronic medical problems.    This includes: symptom review, medication regimen review, laboratory monitoring, and review of diagnostic testing.    Acute issue:  2 days ago patient suffered a puncture wound of the web of the left hand between the first and second digit with a dirty stick.  Not much bleeding.  Site is okay.  Patient asked to see need a tetanus shot as it is been 7 years since his last tetanus booster.  This wound is classified as potentially dirty due to the puncture wound with soil contaminated stick.    And    Hypertension  --- Update 7/10/2019: Stable on current regimen.  Note he is successfully weaned off Norvasc with addition of hydrochlorothiazide and his blood pressure is tolerating hydrochlorothiazide dose reduction to 12.5 g daily  --- Update 3/4/18: Blood pressure stable with medication change with reduction of Norvasc to 5 mg in addition of hydrochlorothiazide 25 mg Monday Wednesday Friday.  --- Update 6/25/18: Stable.  ---update 2/26/18: Stable; still occasional edema on hot days from amlodipine but he is tolerating it.  ---Update 10/30/17: home BP stable.  --- Update 7/5/17: Home BP tracking stable--however he reports some variability  --- Previous visits: BP is stable on dose reduction of Norvasc and a small dose of Toprol --summertime edema from Norvasc.  --- Previous visit:Patient notes that his home blood pressure monitoring is all from 10-20 points higher than what we check in the office.  He reports running in the systolic range of 125-140 at home.  --- Patient asked about switching from Vasotec to lisinopril do to major cost advantages.  He also notes  summertime edema from Norvasc.  Medications  Toprol 12.5 mg daily       ((( Norvasc 5 mg daily )))   -excessively stopped due to venous insufficiency and edema  Lisinopril 30 mg daily  Hydrochlorothiazide 12.5 mg Monday Wednesday Friday  -added back on by nephrologist       (((   ASA   )))  --stopped due to study from [place] recommending against ASA for primary prevention    Anemia  -He has recurrent iron deficiency anemia.  --- Date 7/10/2019: Hemoglobin on 4/18/2019 was normal at 13.5.  No evidence of recurrent GI bleeding.  --- Update 3/4/19: Hemoglobin was normal on last check 11/13/18 at 14.7  --- Update 6/25/18: Stable.  Recent hemoglobin has been normal most recently 3/20/18 was 14.5.  This is normalized since he stopped aspirin.  ---update 2/26/18: EGD done by GI this summer was negative.He is due for follow-up CBC next month.  Already ordered.  --- Update 7/5/17: Patient has had no signs of GI bleeding.  --- [place] to be due to AVM somewhere in his GI tract causing occult GI bleeding.  ---His recent hemoglobin was 9/19/17 Hgb 14.3  -off ASA  2/15/17 hemoglobin was 13.1  8/18/16 was 14.1  5/25/16 was 12.8  12/30/15 was 14.1  6/30/15 was 13.9  ---Previous visit:  He has since stopped his iron for his capsule endoscopy study which was done approximately 3 weeks ago to evaluate colonic and gastric AVMs as well as the small bowel for source of blood loss.----Patient reports that his capsule endoscopy study was negative. GI recommended stopping aspirin.  ---His gastroenterologist feels this is most likely do to either gastric or colonic AVMs  Last EGD and colonoscopy were done in July 2014.    Hypercholesterolemia  -Goal of treatment is LDL around 100  Primary prevention strategy  Last lipids  4/18/2019: Total cholesterol 172 HDL 52   9/19/17:    HDL 71  LDL 92  2/15/17: Total cholesterol 151 HDL 69 LDL 77  5/25/16: Total cholesterol 168 HDL 72 LDL 81  8/26/15: Total cholesterol 198 HDL 71 LDL  119  11/24/14:    HDL 79  LDL 84  Medications  Zocor 40 mg daily    GERD  Medications  Prilosec 20 mg daily    BPH  Medication  Proscar 5 mg daily    Gout  --- Update 7/10/2019: Patient is tolerating hydrochlorothiazide without any episodes of gout.  --- Update 2/26/18: No recent attacks of gout  Last uric acid 9/19/17 was 4.6  6/30/15 was 4.1  Medications  Allopurinol 200 mg daily  (Prednisone 20-30 mg when necessary with a 2-3 day taper for acute attacks--- patient initiates treatment himself with this regimen)    Chronic kidney disease  ---s/p Left Nephrectomy 2006  -Transitional Cell Ca  Creatinine ranges 1.4-1.8  Most recent creatinine on 5/16/2019 was stable at 1.8  11/13/18 was 1.3  -this is the best it has been in years.  3/20/18 was 1.5  6/29/17 was 1.6  2/15/17 was 1.6  8/18/16 was 1.6  5/25/16 was 1.7  12/30/15 was 1.4  4/1/15  was 1.6    History of colon polyp    History of intermittent diverticulitis  -Treated with Augmentin or goes to Cipro/Flagyl if persistent symptoms    Low back pain  -Chronic degenerative scoliosis    -------------------    Acute problem August 2016 -- diplopia  Progress note:  Over the last 6 months she has had at least 3 episodes of very brief diplopia lasting 1-2 minutes.  He reports if he shuts either eye the sensation is eliminated. With both eyes open it occurs.  This resolves spontaneously within 2 minutes.  Patient denies any other associated neurological symptoms including: He has not had weakness, fatigue, dizziness, speech trouble, etc....  Follow-up: Patient saw his ophthalmologist who examined him and ordered an MRI of the brain which was unremarkable and carotid Doppler study which was normal. He suspected that he had transient extraocular muscle weakness for unclear reasons.  He has not had a recurrence of his symptoms since July 2016.    Hosp ER Eval  9/22/15:  non-spec ABD Pain w normal CT ABD.    -------------------    Preventive health care and Screening  summary:   UPDATE  3/6/17  Colonoscopy  -August 2014  Flu vaccine  2014; 2015 HD  -pharm 12/16/15 for HD; high-dose 12/5/16  Pneumovax  Booster 2011; Prevnar 13 given 2/23/15  dT Booster  Tdap March 2012  Zostavax  --patient did not get this from the pharmacy as ordered and now he wants to await the second generation Zostavax from a different  which may have better efficacy--possibly coming later in 2017  PSA  -2.02  8/26/15--note patient is now over the age of 81 so that screening PSA no longer recommended        The following have been reviewed and updated as appropriate in this visit:         Past Medical History:   Diagnosis Date   • Abnormal EKG    • Arrhythmia    • Chronic kidney disease    • Disease of thyroid gland     1991   • Diverticulitis of colon     Diverticulosis   • GI (gastrointestinal bleed)     X3   last 2015   • Heart murmur    • Hyperlipidemia    • Hypertension    • Infectious viral hepatitis    • Lipid disorder    • Renal failure        Past Surgical History:   Procedure Laterality Date   • HERNIA REPAIR      7/2001   • KIDNEY SURGERY Left     kidney cancer       Family History   Problem Relation Age of Onset   • Lung cancer Mother    • Leukemia Father        Social History     Social History   • Marital status:      Spouse name: N/A   • Number of children: N/A   • Years of education: N/A     Occupational History   • Not on file.     Social History Main Topics   • Smoking status: Former Smoker     Quit date: 1970   • Smokeless tobacco: Never Used      Comment: PIPE    • Alcohol use Yes      Comment: wine, daily   • Drug use: No   • Sexual activity: Defer     Other Topics Concern   • Not on file     Social History Narrative   • No narrative on file       Allergies   Allergen Reactions   • Griseofulvin      Other reaction(s): UNKNOWN  fulvicin    • Tetracycline Rash and Other (see comments)     minocin        Current Outpatient Prescriptions   Medication Sig Dispense Refill   •  allopurinol (ZYLOPRIM) 100 mg tablet 100 mg 2 (two) times a day. Take 2 tablets once daily      • finasteride (PROSCAR) 5 mg tablet Take 5 mg by mouth daily.     • hydrochlorothiazide (HYDRODIURIL) 25 mg tablet Take 12.5 mg by mouth 3 (three) times a week (Mon, Wed, Fri).       • lisinopril (PRINIVIL) 40 mg tablet 40 mg. Take 1.5 tablets by oral route every day     • magnesium 250 mg tablet Take 250 mg by mouth daily.     • metoprolol succinate XL (TOPROL-XL) 25 mg 24 hr tablet 25 mg. Take HALF tablet by oral route every day     • omeprazole (PriLOSEC) 20 mg capsule 20 mg. 1 capsule daily 30 minutes to 1 hour before a meal     • simvastatin (ZOCOR) 40 mg tablet Take 1 tablet by mouth daily.  0   • amLODIPine (NORVASC) 10 mg tablet Take 5 mg by mouth daily.       • amoxicillin (AMOXIL) 500 mg capsule take 4 capsules by mouth 1 hour prior to appointment  0   • diphenoxylate-atropine (LOMOTIL) 2.5-0.025 mg per tablet Take 1 tablet by mouth daily as needed for diarrhea. 30 tablet 1   • ferrous sulfate 325 mg (65 mg iron) ER capsule Takes as needed when hemoglobin in low        No current facility-administered medications for this visit.        Review of Systems   Constitutional: Negative for chills and fever.   Respiratory: Negative for cough and shortness of breath.    Cardiovascular: Negative for chest pain and palpitations.        Palpitations and premature beats have resolved completely it appears.   Gastrointestinal: Negative for abdominal pain.   Genitourinary: Negative for hematuria.       Objective     Vitals:    07/10/19 1138   BP: 122/74   Pulse: 63   Resp: 14   Temp: 36.3 °C (97.4 °F)   SpO2: 95%       Physical Exam   Constitutional: He appears well-developed and well-nourished.   HENT:   Head: Normocephalic.   Cardiovascular: Normal rate, regular rhythm and normal heart sounds.    Pulmonary/Chest: Effort normal and breath sounds normal. No respiratory distress. He has no wheezes. He has no rales.    Abdominal: Soft. Bowel sounds are normal. There is no tenderness.   Musculoskeletal: He exhibits no edema.   Neurological: He is alert.       Lab Results   Component Value Date    WBC 6.99 04/18/2019    HGB 13.5 (L) 04/18/2019    HCT 40.5 04/18/2019    .0 (H) 04/18/2019     (L) 04/18/2019         Chemistry        Component Value Date/Time     05/16/2019 1357    K 4.7 05/16/2019 1357     05/16/2019 1357    CO2 25 05/16/2019 1357    BUN 31 (H) 05/16/2019 1357    CREATININE 1.8 (H) 05/16/2019 1357        Component Value Date/Time    CALCIUM 9.5 05/16/2019 1357    ALKPHOS 64 04/18/2019 1209    AST 19 04/18/2019 1209    ALT 16 04/18/2019 1209    BILITOT 1.1 04/18/2019 1209            Lab Results   Component Value Date    CHOL 172 04/18/2019    CHOL 170 09/19/2017    CHOL 151 02/15/2017     Lab Results   Component Value Date    HDL 52 04/18/2019    HDL 71 09/19/2017    HDL 69 02/15/2017     Lab Results   Component Value Date    LDLCALC 110 (H) 04/18/2019    LDLCALC 92 09/19/2017    LDLCALC 77 02/15/2017     Lab Results   Component Value Date    TRIG 52 04/18/2019    TRIG 36 09/19/2017    TRIG 27 (L) 02/15/2017     No results found for: CHOLHDL    Lab Results   Component Value Date    TSH 1.61 11/13/2018       Lab Results   Component Value Date    HGBA1C 4.8 08/26/2015       No results found for: HAV, HEPAIGM, HEPBIGM, HEPBCAB, HBEAG, HEPCAB    No results found for: MICROALBUR, [id number]    Assessment/Plan   1. Puncture wound of left hand without foreign body, initial encounter    2. Essential hypertension    3. CKD (chronic kidney disease) stage 3, GFR 30-59 ml/min (CMS/HCC) (HCC)    4. Hypomagnesemia    5. Iron deficiency anemia, unspecified iron deficiency anemia type    6. Mixed hyperlipidemia      Problem List Items Addressed This Visit     Essential hypertension (Chronic)    Current Assessment & Plan     Blood pressure is well controlled off Norvasc.  Addition of hydrochlorothiazide is  tolerated at 12.5 mg 3 days/week.  Check surveillance CMP again in September.         Relevant Orders    Comprehensive metabolic panel    CKD (chronic kidney disease) stage 3, GFR 30-59 ml/min (CMS/HCC) (HCC)    Current Assessment & Plan     Creatinine is relatively stable.  Has drifted up to 1.8 which may be a manifestation of his hydrochlorothiazide treatment.  Check CMP again in September 2019.         Relevant Orders    Uric acid    Mixed hyperlipidemia (Chronic)    Current Assessment & Plan     Lipid numbers are excellent for primary prevention especially given his age over 80 now at 85 years old.  Continue current treatment.         Iron deficiency anemia    Current Assessment & Plan     No evidence of recurrent GI bleeding.  Last hemoglobin was normal.  Check again with blood work in September.         Relevant Orders    CBC    Hypomagnesemia    Current Assessment & Plan     Patient is tolerating magnesium supplement.  Check magnesium level with other blood work in September.  Continue current treatment.         Relevant Orders    Magnesium    Puncture wound of left hand without foreign body - Primary    Current Assessment & Plan     Patient has a minor puncture wound of his left hand with a dirty stick.  Classify this wound is a dirty wound because of his so will impregnated stick and puncture in nature.  It has been 7 years since his last tetanus vaccination recommend vaccination per guidelines with Tdap today which we have in stock.  This should be covered by Medicare as it is a vaccination related to a wound.         Relevant Orders    Tdap vaccine greater than or equal to 8yo IM (Completed)          Return in about 4 months (around 11/10/2019).    Orders Placed This Encounter   Procedures   • Tdap vaccine greater than or equal to 8yo IM   • Comprehensive metabolic panel     Standing Status:   Future     Standing Expiration Date:   7/10/2020   • CBC     Standing Status:   Future     Standing Expiration  Date:   7/10/2020   • Uric acid     Standing Status:   Future     Standing Expiration Date:   7/10/2020   • Magnesium     Standing Status:   Future     Standing Expiration Date:   7/10/2020         Alex Elliott MD  7/10/2019

## 2019-07-10 NOTE — ASSESSMENT & PLAN NOTE
Blood pressure is well controlled off Norvasc.  Addition of hydrochlorothiazide is tolerated at 12.5 mg 3 days/week.  Check surveillance CMP again in September.

## 2019-07-10 NOTE — ASSESSMENT & PLAN NOTE
Patient is tolerating magnesium supplement.  Check magnesium level with other blood work in September.  Continue current treatment.

## 2019-07-10 NOTE — ASSESSMENT & PLAN NOTE
Creatinine is relatively stable.  Has drifted up to 1.8 which may be a manifestation of his hydrochlorothiazide treatment.  Check CMP again in September 2019.

## 2019-07-10 NOTE — ASSESSMENT & PLAN NOTE
Lipid numbers are excellent for primary prevention especially given his age over 81 now at 85 years old.  Continue current treatment.

## 2019-07-26 ENCOUNTER — OFFICE VISIT (OUTPATIENT)
Dept: CARDIOLOGY | Facility: CLINIC | Age: 84
End: 2019-07-26
Payer: MEDICARE

## 2019-07-26 VITALS
WEIGHT: 146 LBS | HEIGHT: 64 IN | BODY MASS INDEX: 24.92 KG/M2 | SYSTOLIC BLOOD PRESSURE: 118 MMHG | DIASTOLIC BLOOD PRESSURE: 70 MMHG | HEART RATE: 59 BPM

## 2019-07-26 DIAGNOSIS — E78.2 MIXED HYPERLIPIDEMIA: Chronic | ICD-10-CM

## 2019-07-26 DIAGNOSIS — I10 ESSENTIAL HYPERTENSION: Chronic | ICD-10-CM

## 2019-07-26 DIAGNOSIS — I35.1 NONRHEUMATIC AORTIC VALVE INSUFFICIENCY: Primary | Chronic | ICD-10-CM

## 2019-07-26 DIAGNOSIS — I49.49 PREMATURE BEATS: Chronic | ICD-10-CM

## 2019-07-26 DIAGNOSIS — N18.30 CKD (CHRONIC KIDNEY DISEASE) STAGE 3, GFR 30-59 ML/MIN (CMS/HCC): ICD-10-CM

## 2019-07-26 PROCEDURE — 99214 OFFICE O/P EST MOD 30 MIN: CPT | Performed by: INTERNAL MEDICINE

## 2019-07-26 PROCEDURE — 93000 ELECTROCARDIOGRAM COMPLETE: CPT | Performed by: INTERNAL MEDICINE

## 2019-07-26 ASSESSMENT — ENCOUNTER SYMPTOMS
HEADACHES: 0
SHORTNESS OF BREATH: 0
DECREASED APPETITE: 0
DOUBLE VISION: 0
DIZZINESS: 0
DYSURIA: 0
FREQUENCY: 0
PSYCHIATRIC NEGATIVE: 1
CONSTIPATION: 0
BLOATING: 0
HOARSE VOICE: 0
IRREGULAR HEARTBEAT: 0
ALLERGIC/IMMUNOLOGIC NEGATIVE: 1
DIARRHEA: 0

## 2019-07-26 NOTE — PROGRESS NOTES
Cardiology Consult     Reason for visit: Scheduled cardiology follow-up    Chief Complaint   Patient presents with   • Follow-up   Systemic hypertension  Extrasystoles  Status post nephrectomy  Chronic renal insufficiency     HPI     Efrain Cardoan Jr. is a 85 y.o. male  who presents for follow-up since last seen here, he is generally enjoyed good health.. He did develop a problem with significant pedal edema felt secondary to Norvasc.  This agent was discontinued and the edema resolved.  In place of this agent for antihypertensive therapy HCTZ 12.5 mg 3 times a week was added.    He has not had chest pain.  His sense of extrasystoles or palpitations is been very minimal.  Good appetite.  Stable weight.  No neurologic symptoms.  Bowel habits are regular.  General health overall quite good.    He does follow his blood pressure routinely.  He notes that the highest readings he records are typically in the early morning before he takes his medications and then in the late afternoon the blood pressure could be quite low.  At present, he is taking all of his antihypertensive therapy together in the morning.  Past Medical History:   Diagnosis Date   • Abnormal EKG    • Arrhythmia    • Chronic kidney disease    • Disease of thyroid gland     1991   • Diverticulitis of colon     Diverticulosis   • GI (gastrointestinal bleed)     X3   last 2015   • Heart murmur    • Hyperlipidemia    • Hypertension    • Infectious viral hepatitis    • Lipid disorder    • Renal failure      Past Surgical History:   Procedure Laterality Date   • HERNIA REPAIR      7/2001   • KIDNEY SURGERY Left     kidney cancer     Griseofulvin and Tetracycline  Current Outpatient Prescriptions   Medication Sig Dispense Refill   • allopurinol (ZYLOPRIM) 100 mg tablet 100 mg 2 (two) times a day. Take 2 tablets once daily      • amoxicillin (AMOXIL) 500 mg capsule take 4 capsules by mouth 1 hour prior to appointment  0   • diphenoxylate-atropine (LOMOTIL)  2.5-0.025 mg per tablet Take 1 tablet by mouth daily as needed for diarrhea. 30 tablet 1   • ferrous sulfate 325 mg (65 mg iron) ER capsule Takes as needed when hemoglobin in low      • finasteride (PROSCAR) 5 mg tablet Take 5 mg by mouth daily.     • hydrochlorothiazide (HYDRODIURIL) 12.5 mg tablet Take 12.5 mg by mouth 3 (three) times a week (Mon, Wed, Fri).       • lisinopril (PRINIVIL) 40 mg tablet 40 mg. Take 1.5 tablets by oral route every day     • magnesium 250 mg tablet Take 250 mg by mouth daily.     • metoprolol succinate XL (TOPROL-XL) 25 mg 24 hr tablet 25 mg. Take HALF tablet by oral route every day     • omeprazole (PriLOSEC) 20 mg capsule 20 mg. 1 capsule daily 30 minutes to 1 hour before a meal     • simvastatin (ZOCOR) 40 mg tablet Take 1 tablet by mouth daily.  0     No current facility-administered medications for this visit.      Social History     Social History   • Marital status:      Spouse name: N/A   • Number of children: N/A   • Years of education: N/A     Social History Main Topics   • Smoking status: Former Smoker     Quit date: 1970   • Smokeless tobacco: Never Used      Comment: PIPE    • Alcohol use Yes      Comment: wine, daily   • Drug use: No   • Sexual activity: Defer     Other Topics Concern   • None     Social History Narrative   • None     Family History   Problem Relation Age of Onset   • Lung cancer Mother    • Leukemia Father         Review of Systems   Constitution: Negative for decreased appetite.   HENT: Negative for hearing loss and hoarse voice.    Eyes: Negative for double vision.   Cardiovascular: Positive for leg swelling. Negative for chest pain and irregular heartbeat.   Respiratory: Negative for shortness of breath.    Hematologic/Lymphatic: Negative for bleeding problem.   Skin: Negative for rash.   Musculoskeletal: Positive for joint pain.   Gastrointestinal: Negative for bloating, constipation and diarrhea.   Genitourinary: Negative for dysuria and  frequency.   Neurological: Negative for dizziness and headaches.   Psychiatric/Behavioral: Negative.    Allergic/Immunologic: Negative.    All other systems reviewed and are negative.     Objective   Vitals:    07/26/19 0932   BP: 118/70   Pulse: (!) 59   Weight 146 pounds    Physical Exam   Constitutional: He is oriented to person, place, and time. He appears well-nourished. No distress.   Pleasant fit appearing man not acutely distressed.   HENT:   Head: Normocephalic and atraumatic.   Nose: Nose normal.   Eyes: Pupils are equal, round, and reactive to light. Conjunctivae are normal. No scleral icterus.   Neck: Normal range of motion. Neck supple. No JVD present. No thyromegaly present.   Cardiovascular: Normal rate, regular rhythm and intact distal pulses.  Exam reveals no gallop.    Murmur (Soft systolic murmur along the right sternal border.  No diastolic murmur heard at rest or provoked with maneuver.) heard.  No orthostatic change of the blood pressure.   Pulmonary/Chest: Effort normal and breath sounds normal. He has no wheezes. He has no rales.   Abdominal: Soft. Bowel sounds are normal. There is no tenderness. There is no rebound.   Musculoskeletal: Normal range of motion. He exhibits no edema or deformity.   Mild scoliosis of the lumbar spine.   Neurological: He is alert and oriented to person, place, and time.   Skin: Skin is warm and dry. No rash noted.   Psychiatric: He has a normal mood and affect. His behavior is normal. Judgment and thought content normal.   Nursing note and vitals reviewed.      Labs   Lab Results   Component Value Date    WBC 6.99 04/18/2019    HGB 13.5 (L) 04/18/2019    HCT 40.5 04/18/2019     (L) 04/18/2019    CHOL 172 04/18/2019    TRIG 52 04/18/2019    HDL 52 04/18/2019    ALT 16 04/18/2019    AST 19 04/18/2019     05/16/2019    K 4.7 05/16/2019     05/16/2019    CREATININE 1.8 (H) 05/16/2019    BUN 31 (H) 05/16/2019    CO2 25 05/16/2019    TSH 1.61  11/13/2018    PSA 2.02 08/26/2015    INR 1.0 09/22/2015    HGBA1C 4.8 08/26/2015   Imaging  An echocardiogram that was obtained in October 2018 shows normal left ventricular size and function with preserved ejection fraction. There is evidence of mild aortic insufficiency.    ECG   Normal sinus rhythm and normal.  Compared with prior tracing, previously seen ectopic complexes are resolved.       Assessment and plan:    1.  Occasional extrasystoles on EKG. Resolved.  He will continue on beta-blockers.  We will continue to survey this problem with future visits and EKGs.    2.  Hypertension outstanding control in the face of his current medications and exercise regimen.  He is tolerating low-dose thiazide diuretics with good response and minimal impact on his renal function.  With cessation of amlodipine, edema is resolved.  We talked about his blood pressure pattern and I indicated to him that is very typical that the highest blood pressures of the day or the early morning.  An attempt to minimize the variations in his blood pressure I have recommended that he take the ACE inhibitor and thiazide together in the morning and take his beta-blocker dose in the evening.  He was agreeable to this and will continue to survey his blood pressure to see if this change in dose timing has any impact on his blood pressure pattern.    3.  Aortic insufficiency.  Very minimal progression in this finding since it was first discovered in 1991.  Not clearly evident on examination certainly he has no symptoms of congestive heart failure.  Continued aggressive treatment of his blood pressure is indicated and I would certainly continue the ACE inhibitor.  Echocardiography could be considered in the next 6-12 months in follow-up to follow this finding.  He will continue to pursue antibiotic endocarditis prophylaxis under appropriate circumstances.    4.  Single kidney status post nephrectomy.  Stable creatinine as of recent laboratory  studies.     5.  Gastrointestinal bleeding secondary to aspirin.  No clear etiology found despite aggressive GI workup.  He is off aspirin and his hemoglobin is remained stable.    6.  History of thyroiditis.  Recent laboratory studies reveal normal TSH.    In summary then, I believe  He enjoys overall excellent health and certainly excellent cardiovascular health.  He is committed to exercise and I see no reason to limit her changes program.      I hope to see him in 6 months in follow-up to today's visit but will be happy to see him sooner.  I certainly encouraged him to call me if there is any change in the pattern of palpitations or other symptoms that may be related to a change in his cardiovascular status.        Timoteo Kim MD  7/26/2019

## 2019-07-26 NOTE — LETTER
July 26, 2019     Alex Elliott MD  [address] [facility] [address]  [facility] [address]    Patient: Efrain Cardona Jr.  YOB: 1934  Date of Visit: 7/26/2019      Dear Dr. Elliott:    Thank you for referring Efrain Cardona Jr. to me for evaluation. Below are my notes for this consultation.    If you have questions, please do not hesitate to call me. I look forward to following your patient along with you.         Sincerely,        Timoteo Kim MD        CC: MD Charles Estes MD Coady, Paul M, MD  7/26/2019 10:01 AM  Sign at close encounter      Cardiology Consult     Reason for visit: Scheduled cardiology follow-up    Chief Complaint   Patient presents with   • Follow-up   Systemic hypertension  Extrasystoles  Status post nephrectomy  Chronic renal insufficiency     HPI     Efrain Cardona Jr. is a 85 y.o. male  who presents for follow-up since last seen here, he is generally enjoyed good health.. He did develop a problem with significant pedal edema felt secondary to Norvasc.  This agent was discontinued and the edema resolved.  In place of this agent for antihypertensive therapy HCTZ 12.5 mg 3 times a week was added.    He has not had chest pain.  His sense of extrasystoles or palpitations is been very minimal.  Good appetite.  Stable weight.  No neurologic symptoms.  Bowel habits are regular.  General health overall quite good.    He does follow his blood pressure routinely.  He notes that the highest readings he records are typically in the early morning before he takes his medications and then in the late afternoon the blood pressure could be quite low.  At present, he is taking all of his antihypertensive therapy together in the morning.  Past Medical History:   Diagnosis Date   • Abnormal EKG    • Arrhythmia    • Chronic kidney disease    • Disease of thyroid gland     1991   • Diverticulitis of colon     Diverticulosis   • GI (gastrointestinal bleed)      X3   last 2015   • Heart murmur    • Hyperlipidemia    • Hypertension    • Infectious viral hepatitis    • Lipid disorder    • Renal failure      Past Surgical History:   Procedure Laterality Date   • HERNIA REPAIR      7/2001   • KIDNEY SURGERY Left     kidney cancer     Griseofulvin and Tetracycline  Current Outpatient Prescriptions   Medication Sig Dispense Refill   • allopurinol (ZYLOPRIM) 100 mg tablet 100 mg 2 (two) times a day. Take 2 tablets once daily      • amoxicillin (AMOXIL) 500 mg capsule take 4 capsules by mouth 1 hour prior to appointment  0   • diphenoxylate-atropine (LOMOTIL) 2.5-0.025 mg per tablet Take 1 tablet by mouth daily as needed for diarrhea. 30 tablet 1   • ferrous sulfate 325 mg (65 mg iron) ER capsule Takes as needed when hemoglobin in low      • finasteride (PROSCAR) 5 mg tablet Take 5 mg by mouth daily.     • hydrochlorothiazide (HYDRODIURIL) 12.5 mg tablet Take 12.5 mg by mouth 3 (three) times a week (Mon, Wed, Fri).       • lisinopril (PRINIVIL) 40 mg tablet 40 mg. Take 1.5 tablets by oral route every day     • magnesium 250 mg tablet Take 250 mg by mouth daily.     • metoprolol succinate XL (TOPROL-XL) 25 mg 24 hr tablet 25 mg. Take HALF tablet by oral route every day     • omeprazole (PriLOSEC) 20 mg capsule 20 mg. 1 capsule daily 30 minutes to 1 hour before a meal     • simvastatin (ZOCOR) 40 mg tablet Take 1 tablet by mouth daily.  0     No current facility-administered medications for this visit.      Social History     Social History   • Marital status:      Spouse name: N/A   • Number of children: N/A   • Years of education: N/A     Social History Main Topics   • Smoking status: Former Smoker     Quit date: 1970   • Smokeless tobacco: Never Used      Comment: PIPE    • Alcohol use Yes      Comment: wine, daily   • Drug use: No   • Sexual activity: Defer     Other Topics Concern   • None     Social History Narrative   • None     Family History   Problem Relation Age  of Onset   • Lung cancer Mother    • Leukemia Father         Review of Systems   Constitution: Negative for decreased appetite.   HENT: Negative for hearing loss and hoarse voice.    Eyes: Negative for double vision.   Cardiovascular: Positive for leg swelling. Negative for chest pain and irregular heartbeat.   Respiratory: Negative for shortness of breath.    Hematologic/Lymphatic: Negative for bleeding problem.   Skin: Negative for rash.   Musculoskeletal: Positive for joint pain.   Gastrointestinal: Negative for bloating, constipation and diarrhea.   Genitourinary: Negative for dysuria and frequency.   Neurological: Negative for dizziness and headaches.   Psychiatric/Behavioral: Negative.    Allergic/Immunologic: Negative.    All other systems reviewed and are negative.     Objective   Vitals:    07/26/19 0932   BP: 118/70   Pulse: (!) 59   Weight 146 pounds    Physical Exam   Constitutional: He is oriented to person, place, and time. He appears well-nourished. No distress.   Pleasant fit appearing man not acutely distressed.   HENT:   Head: Normocephalic and atraumatic.   Nose: Nose normal.   Eyes: Pupils are equal, round, and reactive to light. Conjunctivae are normal. No scleral icterus.   Neck: Normal range of motion. Neck supple. No JVD present. No thyromegaly present.   Cardiovascular: Normal rate, regular rhythm and intact distal pulses.  Exam reveals no gallop.    Murmur (Soft systolic murmur along the right sternal border.  No diastolic murmur heard at rest or provoked with maneuver.) heard.  No orthostatic change of the blood pressure.   Pulmonary/Chest: Effort normal and breath sounds normal. He has no wheezes. He has no rales.   Abdominal: Soft. Bowel sounds are normal. There is no tenderness. There is no rebound.   Musculoskeletal: Normal range of motion. He exhibits no edema or deformity.   Mild scoliosis of the lumbar spine.   Neurological: He is alert and oriented to person, place, and time.    Skin: Skin is warm and dry. No rash noted.   Psychiatric: He has a normal mood and affect. His behavior is normal. Judgment and thought content normal.   Nursing note and vitals reviewed.      Labs   Lab Results   Component Value Date    WBC 6.99 04/18/2019    HGB 13.5 (L) 04/18/2019    HCT 40.5 04/18/2019     (L) 04/18/2019    CHOL 172 04/18/2019    TRIG 52 04/18/2019    HDL 52 04/18/2019    ALT 16 04/18/2019    AST 19 04/18/2019     05/16/2019    K 4.7 05/16/2019     05/16/2019    CREATININE 1.8 (H) 05/16/2019    BUN 31 (H) 05/16/2019    CO2 25 05/16/2019    TSH 1.61 11/13/2018    PSA 2.02 08/26/2015    INR 1.0 09/22/2015    HGBA1C 4.8 08/26/2015   Imaging  An echocardiogram that was obtained in October 2018 shows normal left ventricular size and function with preserved ejection fraction. There is evidence of mild aortic insufficiency.    ECG   Normal sinus rhythm and normal.  Compared with prior tracing, previously seen ectopic complexes are resolved.       Assessment and plan:    1.  Occasional extrasystoles on EKG. Resolved.  He will continue on beta-blockers.  We will continue to survey this problem with future visits and EKGs.    2.  Hypertension outstanding control in the face of his current medications and exercise regimen.  He is tolerating low-dose thiazide diuretics with good response and minimal impact on his renal function.  With cessation of amlodipine, edema is resolved.  We talked about his blood pressure pattern and I indicated to him that is very typical that the highest blood pressures of the day or the early morning.  An attempt to minimize the variations in his blood pressure I have recommended that he take the ACE inhibitor and thiazide together in the morning and take his beta-blocker dose in the evening.  He was agreeable to this and will continue to survey his blood pressure to see if this change in dose timing has any impact on his blood pressure pattern.    3.  Aortic  insufficiency.  Very minimal progression in this finding since it was first discovered in 1991.  Not clearly evident on examination certainly he has no symptoms of congestive heart failure.  Continued aggressive treatment of his blood pressure is indicated and I would certainly continue the ACE inhibitor.  Echocardiography could be considered in the next 6-12 months in follow-up to follow this finding.  He will continue to pursue antibiotic endocarditis prophylaxis under appropriate circumstances.    4.  Single kidney status post nephrectomy.  Stable creatinine as of recent laboratory studies.     5.  Gastrointestinal bleeding secondary to aspirin.  No clear etiology found despite aggressive GI workup.  He is off aspirin and his hemoglobin is remained stable.    6.  History of thyroiditis.  Recent laboratory studies reveal normal TSH.    In summary then, I believe  He enjoys overall excellent health and certainly excellent cardiovascular health.  He is committed to exercise and I see no reason to limit her changes program.      I hope to see him in 6 months in follow-up to today's visit but will be happy to see him sooner.  I certainly encouraged him to call me if there is any change in the pattern of palpitations or other symptoms that may be related to a change in his cardiovascular status.        Timoteo Kim MD  7/26/2019

## 2019-09-18 ENCOUNTER — APPOINTMENT (OUTPATIENT)
Dept: LAB | Facility: CLINIC | Age: 84
End: 2019-09-18
Attending: INTERNAL MEDICINE
Payer: MEDICARE

## 2019-09-18 DIAGNOSIS — I10 ESSENTIAL HYPERTENSION: Chronic | ICD-10-CM

## 2019-09-18 DIAGNOSIS — N18.30 CKD (CHRONIC KIDNEY DISEASE) STAGE 3, GFR 30-59 ML/MIN (CMS/HCC): ICD-10-CM

## 2019-09-18 DIAGNOSIS — E83.42 HYPOMAGNESEMIA: ICD-10-CM

## 2019-09-18 DIAGNOSIS — D50.9 IRON DEFICIENCY ANEMIA, UNSPECIFIED IRON DEFICIENCY ANEMIA TYPE: ICD-10-CM

## 2019-09-18 LAB
ALBUMIN SERPL-MCNC: 4.2 G/DL (ref 3.4–5)
ALP SERPL-CCNC: 65 IU/L (ref 35–126)
ALT SERPL-CCNC: 18 IU/L (ref 16–63)
ANION GAP SERPL CALC-SCNC: 9 MEQ/L (ref 3–15)
AST SERPL-CCNC: 23 IU/L (ref 15–41)
BILIRUB SERPL-MCNC: 0.9 MG/DL (ref 0.3–1.2)
BUN SERPL-MCNC: 39 MG/DL (ref 8–20)
CALCIUM SERPL-MCNC: 9.4 MG/DL (ref 8.9–10.3)
CHLORIDE SERPL-SCNC: 106 MEQ/L (ref 98–109)
CO2 SERPL-SCNC: 24 MEQ/L (ref 22–32)
CREAT SERPL-MCNC: 1.7 MG/DL
ERYTHROCYTE [DISTWIDTH] IN BLOOD BY AUTOMATED COUNT: 15.4 % (ref 11.6–14.4)
GFR SERPL CREATININE-BSD FRML MDRD: 38.6 ML/MIN/1.73M*2
GLUCOSE SERPL-MCNC: 83 MG/DL (ref 70–99)
HCT VFR BLDCO AUTO: 38 %
HGB BLD-MCNC: 12.7 G/DL
MAGNESIUM SERPL-MCNC: 1.6 MG/DL (ref 1.8–2.5)
MCH RBC QN AUTO: 35.1 PG (ref 28–33.2)
MCHC RBC AUTO-ENTMCNC: 33.4 G/DL (ref 32.2–36.5)
MCV RBC AUTO: 105 FL (ref 83–98)
PDW BLD AUTO: 11.1 FL (ref 9.4–12.4)
PLATELET # BLD AUTO: 159 K/UL
POTASSIUM SERPL-SCNC: 4.6 MEQ/L (ref 3.6–5.1)
PROT SERPL-MCNC: 6.7 G/DL (ref 6–8.2)
RBC # BLD AUTO: 3.62 M/UL (ref 4.5–5.8)
SODIUM SERPL-SCNC: 139 MEQ/L (ref 136–144)
URATE SERPL-MCNC: 4.8 MG/DL (ref 4.8–8.2)
WBC # BLD AUTO: 5.42 K/UL

## 2019-09-18 PROCEDURE — 83735 ASSAY OF MAGNESIUM: CPT

## 2019-09-18 PROCEDURE — 80053 COMPREHEN METABOLIC PANEL: CPT

## 2019-09-18 PROCEDURE — 84550 ASSAY OF BLOOD/URIC ACID: CPT

## 2019-09-18 PROCEDURE — 85027 COMPLETE CBC AUTOMATED: CPT

## 2019-09-18 PROCEDURE — 36415 COLL VENOUS BLD VENIPUNCTURE: CPT

## 2019-09-24 ENCOUNTER — TELEPHONE (OUTPATIENT)
Dept: PRIMARY CARE | Facility: CLINIC | Age: 84
End: 2019-09-24

## 2019-09-24 NOTE — TELEPHONE ENCOUNTER
----- Message from Alex Elliott MD sent at 9/23/2019  2:01 PM EDT -----  Radha please call Dr. Cardona know that his laboratory studies were good including stable renal function with a creatinine of 1.7, relatively low uric acid, hemoglobin and reasonably near normal range of 12.7.  His magnesium level was slightly low but it has been.  We might consider a very low dose of over-the-counter magnesium supplement 1 to 2 days/week.  I would not want him to take it every day because of his renal insufficiency.  Thanks

## 2019-09-24 NOTE — TELEPHONE ENCOUNTER
Pt is taking 500 units of magnesium 3 X per week- will increase to ponce - all other labs were discussed-pt expressed understanding

## 2019-11-06 ENCOUNTER — OFFICE VISIT (OUTPATIENT)
Dept: PRIMARY CARE | Facility: CLINIC | Age: 84
End: 2019-11-06
Payer: MEDICARE

## 2019-11-06 VITALS
OXYGEN SATURATION: 97 % | WEIGHT: 148 LBS | DIASTOLIC BLOOD PRESSURE: 80 MMHG | HEART RATE: 52 BPM | BODY MASS INDEX: 25.27 KG/M2 | SYSTOLIC BLOOD PRESSURE: 136 MMHG | TEMPERATURE: 97.6 F | HEIGHT: 64 IN

## 2019-11-06 DIAGNOSIS — E83.42 HYPOMAGNESEMIA: ICD-10-CM

## 2019-11-06 DIAGNOSIS — I10 ESSENTIAL HYPERTENSION: Primary | Chronic | ICD-10-CM

## 2019-11-06 DIAGNOSIS — N18.30 CKD (CHRONIC KIDNEY DISEASE) STAGE 3, GFR 30-59 ML/MIN (CMS/HCC): ICD-10-CM

## 2019-11-06 DIAGNOSIS — M81.0 AGE RELATED OSTEOPOROSIS, UNSPECIFIED PATHOLOGICAL FRACTURE PRESENCE: ICD-10-CM

## 2019-11-06 DIAGNOSIS — E78.2 MIXED HYPERLIPIDEMIA: Chronic | ICD-10-CM

## 2019-11-06 DIAGNOSIS — M10.9 GOUT, UNSPECIFIED CAUSE, UNSPECIFIED CHRONICITY, UNSPECIFIED SITE: ICD-10-CM

## 2019-11-06 PROCEDURE — 99214 OFFICE O/P EST MOD 30 MIN: CPT | Performed by: INTERNAL MEDICINE

## 2019-11-06 ASSESSMENT — ENCOUNTER SYMPTOMS
SHORTNESS OF BREATH: 0
CHILLS: 0
PALPITATIONS: 0
FEVER: 0
ABDOMINAL PAIN: 0
HEMATURIA: 0
COUGH: 0

## 2019-11-06 NOTE — ASSESSMENT & PLAN NOTE
Creatinine stable.  The patient reports he did not start on Fosamax for osteoporosis because of the cautioned not to use it with creatinine clearance less than 35 which is about where he lives.

## 2019-11-06 NOTE — ASSESSMENT & PLAN NOTE
Blood pressure stable on current regimen.  Recommend continue current treatment.  Cardiologist and I agree that the transient morning hypertension which then comes down to be very is not a major concern in isolation without symptoms.  Continue current treatment.

## 2019-11-06 NOTE — ASSESSMENT & PLAN NOTE
As above this may be a complication of PPI therapy.  Recommend discontinue PPI begin a trial of H2 blocker Zantac 150 mg daily.  Check magnesium level which was ordered at today's visit with next labs.

## 2019-11-06 NOTE — ASSESSMENT & PLAN NOTE
Stable on current treatment.  Continue current treatment.  Check uric acid with next laboratory studies that were ordered at today's visit.

## 2019-11-06 NOTE — ASSESSMENT & PLAN NOTE
May be related to PPI dosing over the years.  Recommend trial switch to H2 blocker such as Zantac 150 mg at bedtime.  He may also check this recommendation with his GI specialist.  Not a candidate of Fosamax due to kidney disease.  Clearance in the ballpark of 35.  Recommend he call his rheumatologist to discuss a trial of Prolia.

## 2019-11-06 NOTE — PROGRESS NOTES
Alex Elliott MD  Geriatric Medicine                                                    GERIATRIC MEDICINE    Subjective      Patient ID: Efrain Cardona Jr. is a 85 y.o. male.    Disease Management Visit    The patient is here for an acute problem and for disease Management for evaluation and management of the following chronic medical problems.    This includes: symptom review, medication regimen review, laboratory monitoring, and review of diagnostic testing.    Acute issue:  2 days ago patient suffered a puncture wound of the web of the left hand between the first and second digit with a dirty stick.  Not much bleeding.  Site is okay.  Patient asked to see need a tetanus shot as it is been 7 years since his last tetanus booster.  This wound is classified as potentially dirty due to the puncture wound with soil contaminated stick.    And    Hypertension  --- Update 11/6/2019: Patient is concerned because he has hypertension on morning blood pressure check in the range of 160/90 but by afternoon it is down to 120/80.  At today's visit he is 136/80.  His cardiologist had previously addressed this concern and was not concerned about it.  --- Update 7/10/2019: Stable on current regimen.  Note he is successfully weaned off Norvasc with addition of hydrochlorothiazide and his blood pressure is tolerating hydrochlorothiazide dose reduction to 12.5 g daily  --- Update 3/4/18: Blood pressure stable with medication change with reduction of Norvasc to 5 mg in addition of hydrochlorothiazide 25 mg Monday Wednesday Friday.  --- Update 6/25/18: Stable.  ---update 2/26/18: Stable; still occasional edema on hot days from amlodipine but he is tolerating it.  ---Update 10/30/17: home BP stable.  --- Update 7/5/17: Home BP tracking stable--however he reports some variability  --- Previous visits: BP is stable on dose reduction of Norvasc and a small dose of Toprol --summertime edema from Norvasc.  --- Previous  visit:Patient notes that his home blood pressure monitoring is all from 10-20 points higher than what we check in the office.  He reports running in the systolic range of 125-140 at home.  --- Patient asked about switching from Vasotec to lisinopril do to major cost advantages.  He also notes summertime edema from Norvasc.  Medications  Toprol 12.5 mg daily       ((( Norvasc 5 mg daily )))   -excessively stopped due to venous insufficiency and edema  Lisinopril 30 mg daily  Hydrochlorothiazide 12.5 mg Monday Wednesday Friday  -added back on by nephrologist       (((   ASA   )))  --stopped due to study from [place] recommending against ASA for primary prevention    Anemia  -He has recurrent iron deficiency anemia.  --- Update 11/6/2019: Hemoglobin was normal at 12.7 on most recent check which was 9/18/2019.  --- Date 7/10/2019: Hemoglobin on 4/18/2019 was normal at 13.5.  No evidence of recurrent GI bleeding.  --- Update 3/4/19: Hemoglobin was normal on last check 11/13/18 at 14.7  --- Update 6/25/18: Stable.  Recent hemoglobin has been normal most recently 3/20/18 was 14.5.  This is normalized since he stopped aspirin.  ---update 2/26/18: EGD done by GI this summer was negative.He is due for follow-up CBC next month.  Already ordered.  --- Update 7/5/17: Patient has had no signs of GI bleeding.  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  ---His recent hemoglobin was 9/19/17 Hgb 14.3  -off ASA  2/15/17 hemoglobin was 13.1  8/18/16 was 14.1  5/25/16 was 12.8  12/30/15 was 14.1  6/30/15 was 13.9  ---Previous visit:  He has since stopped his iron for his capsule endoscopy study which was done approximately 3 weeks ago to evaluate colonic and gastric AVMs as well as the small bowel for source of blood loss.----Patient reports that his capsule endoscopy study was negative. GI recommended stopping aspirin.  ---His gastroenterologist feels this is most likely do to either gastric or  colonic AVMs  Last EGD and colonoscopy were done in July 2014.    Hypercholesterolemia  -Goal of treatment is LDL around 100  Primary prevention strategy  Last lipids  --- Update  4/18/2019: Total cholesterol 172 HDL 52   9/19/17:    HDL 71  LDL 92  2/15/17: Total cholesterol 151 HDL 69 LDL 77  5/25/16: Total cholesterol 168 HDL 72 LDL 81  8/26/15: Total cholesterol 198 HDL 71   11/24/14:    HDL 79  LDL 84  Medications  Zocor 40 mg daily    GERD  --- Update 11/6/2019: Patient reports that he takes his PPI about 5 days/week.  In light of his osteoporosis and hypomagnesemia we discussed possibly switching to an H2 blocker such as ranitidine.  We discussed recommended trial of ranitidine 150 mg daily at bedtime.  He may check with his GI specialist Dr. Rollins about this recommendation.  As above he has 2 of the complications that PPI cause including accelerated osteoporosis and hypomagnesemia.  Medications  Prilosec 20 mg daily    BPH  Medication  Proscar 5 mg daily    Gout  --- Update 11/6/2019: Stable without recent attack  --- Update 7/10/2019: Patient is tolerating hydrochlorothiazide without any episodes of gout.  --- Update 2/26/18: No recent attacks of gout  Last uric acid 9/19/17 was 4.6  6/30/15 was 4.1  Medications  Allopurinol 200 mg daily  (Prednisone 20-30 mg when necessary with a 2-3 day taper for acute attacks--- patient initiates treatment himself with this regimen)    Chronic kidney disease  ---s/p Left Nephrectomy 2006  -Transitional Cell Ca  Creatinine ranges 1.4-1.8  Most recent creatinine on 9/18/2019 was stable at 1.7  5/16/2019 was stable at 1.8  11/13/18 was 1.3  -this is the best it has been in years.  3/20/18 was 1.5  6/29/17 was 1.6  2/15/17 was 1.6  8/18/16 was 1.6  5/25/16 was 1.7  12/30/15 was 1.4  4/1/15  was 1.6    History of colon polyp    History of intermittent diverticulitis  -Treated with Augmentin or goes to Cipro/Flagyl if persistent symptoms    Low back  pain  -Chronic degenerative scoliosis    Hypomagnesemia  --Patient takes an OTC magnesium supplement.  -------------------    Acute problem August 2016 -- diplopia  Progress note:  Over the last 6 months she has had at least 3 episodes of very brief diplopia lasting 1-2 minutes.  He reports if he shuts either eye the sensation is eliminated. With both eyes open it occurs.  This resolves spontaneously within 2 minutes.  Patient denies any other associated neurological symptoms including: He has not had weakness, fatigue, dizziness, speech trouble, etc....  Follow-up: Patient saw his ophthalmologist who examined him and ordered an MRI of the brain which was unremarkable and carotid Doppler study which was normal. He suspected that he had transient extraocular muscle weakness for unclear reasons.  He has not had a recurrence of his symptoms since July 2016.    Hosp ER Eval  9/22/15:  non-spec ABD Pain w normal CT ABD.    -------------------    Preventive health care and Screening summary:   UPDATE  3/6/17  Colonoscopy  -August 2014  Flu vaccine  2014; 2015 HD  -pharm 12/16/15 for HD; high-dose 12/5/16  Pneumovax  Booster 2011; Prevnar 13 given 2/23/15  dT Booster  Tdap March 2012  Zostavax  --patient did not get this from the pharmacy as ordered and now he wants to await the second generation Zostavax from a different  which may have better efficacy--possibly coming later in 2017  PSA  -2.02  8/26/15--note patient is now over the age of 81 so that screening PSA no longer recommended      The following have been reviewed and updated as appropriate in this visit:         Past Medical History:   Diagnosis Date   • Abnormal EKG    • Arrhythmia    • Chronic kidney disease    • Disease of thyroid gland     1991   • Diverticulitis of colon     Diverticulosis   • GI (gastrointestinal bleed)     X3   last 2015   • Heart murmur    • Hyperlipidemia    • Hypertension    • Infectious viral hepatitis    • Lipid disorder     • Renal failure        Past Surgical History:   Procedure Laterality Date   • HERNIA REPAIR      2001   • KIDNEY SURGERY Left     kidney cancer       Family History   Problem Relation Age of Onset   • Lung cancer Biological Mother    • Leukemia Biological Father        Social History     Socioeconomic History   • Marital status:      Spouse name: Not on file   • Number of children: Not on file   • Years of education: Not on file   • Highest education level: Not on file   Occupational History   • Not on file   Social Needs   • Financial resource strain: Not on file   • Food insecurity:     Worry: Not on file     Inability: Not on file   • Transportation needs:     Medical: Not on file     Non-medical: Not on file   Tobacco Use   • Smoking status: Former Smoker     Last attempt to quit: 1970     Years since quittin.8   • Smokeless tobacco: Never Used   • Tobacco comment: PIPE    Substance and Sexual Activity   • Alcohol use: Yes     Comment: wine, daily   • Drug use: No   • Sexual activity: Defer   Lifestyle   • Physical activity:     Days per week: Not on file     Minutes per session: Not on file   • Stress: Not on file   Relationships   • Social connections:     Talks on phone: Not on file     Gets together: Not on file     Attends Protestant service: Not on file     Active member of club or organization: Not on file     Attends meetings of clubs or organizations: Not on file     Relationship status: Not on file   • Intimate partner violence:     Fear of current or ex partner: Not on file     Emotionally abused: Not on file     Physically abused: Not on file     Forced sexual activity: Not on file   Other Topics Concern   • Not on file   Social History Narrative   • Not on file       Allergies   Allergen Reactions   • Griseofulvin      Other reaction(s): UNKNOWN  fulvicin    • Tetracycline Rash and Other (see comments)     minocin        Current Outpatient Medications   Medication Sig Dispense Refill    • allopurinol (ZYLOPRIM) 100 mg tablet 100 mg 2 (two) times a day. Take 2 tablets once daily      • finasteride (PROSCAR) 5 mg tablet Take 5 mg by mouth daily.     • hydrochlorothiazide (HYDRODIURIL) 12.5 mg tablet Take 12.5 mg by mouth 4 (four) times a week (Tue, Thu, Sat, Sun).       • lisinopril (PRINIVIL) 40 mg tablet 40 mg. Take 1.5 tablets by oral route every day     • magnesium 250 mg tablet Take 250 mg by mouth daily.     • metoprolol succinate XL (TOPROL-XL) 25 mg 24 hr tablet 25 mg. Take HALF tablet by oral route every day     • omeprazole (PriLOSEC) 20 mg capsule 20 mg. 1 capsule daily 30 minutes to 1 hour before a meal     • simvastatin (ZOCOR) 40 mg tablet Take 1 tablet by mouth daily.  0   • amoxicillin (AMOXIL) 500 mg capsule take 4 capsules by mouth 1 hour prior to appointment  0   • diphenoxylate-atropine (LOMOTIL) 2.5-0.025 mg per tablet Take 1 tablet by mouth daily as needed for diarrhea. (Patient not taking: Reported on 11/6/2019 ) 30 tablet 1   • ferrous sulfate 325 mg (65 mg iron) ER capsule Takes as needed when hemoglobin in low        No current facility-administered medications for this visit.        Review of Systems   Constitutional: Negative for chills and fever.   Respiratory: Negative for cough and shortness of breath.    Cardiovascular: Negative for chest pain and palpitations.        Palpitations and premature beats have resolved completely it appears.   Gastrointestinal: Negative for abdominal pain.   Genitourinary: Negative for hematuria.       Objective     Vitals:    11/06/19 1011   BP: 136/80   Pulse: (!) 52   Temp: 36.4 °C (97.6 °F)   SpO2: 97%       Physical Exam   Constitutional: He appears well-developed and well-nourished.   HENT:   Head: Normocephalic.   Cardiovascular: Normal rate, regular rhythm and normal heart sounds.   Pulmonary/Chest: Effort normal and breath sounds normal. No respiratory distress. He has no wheezes. He has no rales.   Abdominal: Soft. Bowel sounds  are normal. There is no tenderness.   Musculoskeletal: Normal range of motion. He exhibits no edema.   Neurological: He is alert.       Lab Results   Component Value Date    WBC 5.42 09/18/2019    HGB 12.7 (L) 09/18/2019    HCT 38.0 (L) 09/18/2019    .0 (H) 09/18/2019     09/18/2019         Chemistry        Component Value Date/Time     09/18/2019 1405    K 4.6 09/18/2019 1405     09/18/2019 1405    CO2 24 09/18/2019 1405    BUN 39 (H) 09/18/2019 1405    CREATININE 1.7 (H) 09/18/2019 1405        Component Value Date/Time    CALCIUM 9.4 09/18/2019 1405    ALKPHOS 65 09/18/2019 1405    AST 23 09/18/2019 1405    ALT 18 09/18/2019 1405    BILITOT 0.9 09/18/2019 1405            Lab Results   Component Value Date    CHOL 172 04/18/2019    CHOL 170 09/19/2017    CHOL 151 02/15/2017     Lab Results   Component Value Date    HDL 52 04/18/2019    HDL 71 09/19/2017    HDL 69 02/15/2017     Lab Results   Component Value Date    LDLCALC 110 (H) 04/18/2019    LDLCALC 92 09/19/2017    LDLCALC 77 02/15/2017     Lab Results   Component Value Date    TRIG 52 04/18/2019    TRIG 36 09/19/2017    TRIG 27 (L) 02/15/2017     No results found for: CHOLHDL    Lab Results   Component Value Date    TSH 1.61 11/13/2018       Lab Results   Component Value Date    HGBA1C 4.8 08/26/2015       No results found for: HAV, HEPAIGM, HEPBIGM, HEPBCAB, HBEAG, HEPCAB    No results found for: MICROALBUR, [id number]    Assessment/Plan   1. Essential hypertension    2. CKD (chronic kidney disease) stage 3, GFR 30-59 ml/min (CMS/[facility])    3. Mixed hyperlipidemia    4. Age related osteoporosis, unspecified pathological fracture presence    5. Gout, unspecified cause, unspecified chronicity, unspecified site    6. Hypomagnesemia      Problem List Items Addressed This Visit        Circulatory    Essential hypertension - Primary (Chronic)    Current Assessment & Plan     Blood pressure stable on current regimen.  Recommend continue current  treatment.  Cardiologist and I agree that the transient morning hypertension which then comes down to be very is not a major concern in isolation without symptoms.  Continue current treatment.         Relevant Orders    Comprehensive metabolic panel    Magnesium    CBC       Genitourinary    CKD (chronic kidney disease) stage 3, GFR 30-59 ml/min (CMS/[facility])    Current Assessment & Plan     Creatinine stable.  The patient reports he did not start on Fosamax for osteoporosis because of the cautioned not to use it with creatinine clearance less than 35 which is about where he lives.              Musculoskeletal    Age related osteoporosis    Current Assessment & Plan     May be related to PPI dosing over the years.  Recommend trial switch to H2 blocker such as Zantac 150 mg at bedtime.  He may also check this recommendation with his GI specialist.  Not a candidate of Fosamax due to kidney disease.  Clearance in the ballpark of 35.  Recommend he call his rheumatologist to discuss a trial of Prolia.            Endocrine/Metabolic    Mixed hyperlipidemia (Chronic)    Current Assessment & Plan     Stable.  Check fasting lipid profile with next blood work that was ordered at today's visit.         Relevant Orders    Lipid panel    Gout    Current Assessment & Plan     Stable on current treatment.  Continue current treatment.  Check uric acid with next laboratory studies that were ordered at today's visit.         Relevant Orders    Uric acid    Hypomagnesemia    Current Assessment & Plan     As above this may be a complication of PPI therapy.  Recommend discontinue PPI begin a trial of H2 blocker Zantac 150 mg daily.  Check magnesium level which was ordered at today's visit with next labs.               No follow-ups on file.    Orders Placed This Encounter   Procedures   • Comprehensive metabolic panel     Standing Status:   Future     Standing Expiration Date:   11/6/2020     Order Specific Question:   Has the patient  fasted?     Answer:   Yes   • Lipid panel     Standing Status:   Future     Standing Expiration Date:   11/6/2020     Order Specific Question:   Has the patient fasted?     Answer:   Yes   • Magnesium     Standing Status:   Future     Standing Expiration Date:   11/6/2020     Order Specific Question:   Has the patient fasted?     Answer:   Yes   • CBC     Standing Status:   Future     Standing Expiration Date:   11/6/2020   • Uric acid     Standing Status:   Future     Standing Expiration Date:   11/6/2020     Order Specific Question:   Has the patient fasted?     Answer:   Yes         Alex Elliott MD  11/6/2019

## 2020-01-09 ENCOUNTER — OFFICE VISIT (OUTPATIENT)
Dept: OTOLARYNGOLOGY | Facility: CLINIC | Age: 85
End: 2020-01-09
Payer: MEDICARE

## 2020-01-09 ENCOUNTER — PROCEDURE VISIT (OUTPATIENT)
Dept: OTOLARYNGOLOGY | Facility: CLINIC | Age: 85
End: 2020-01-09
Payer: MEDICARE

## 2020-01-09 VITALS
BODY MASS INDEX: 25.27 KG/M2 | HEIGHT: 64 IN | WEIGHT: 148 LBS | DIASTOLIC BLOOD PRESSURE: 64 MMHG | OXYGEN SATURATION: 98 % | HEART RATE: 63 BPM | SYSTOLIC BLOOD PRESSURE: 114 MMHG

## 2020-01-09 DIAGNOSIS — H90.3 SENSORINEURAL HEARING LOSS (SNHL) OF BOTH EARS: Primary | ICD-10-CM

## 2020-01-09 DIAGNOSIS — H61.21 IMPACTED CERUMEN OF RIGHT EAR: ICD-10-CM

## 2020-01-09 DIAGNOSIS — H93.13 SUBJECTIVE TINNITUS OF BOTH EARS: ICD-10-CM

## 2020-01-09 PROCEDURE — 99204 OFFICE O/P NEW MOD 45 MIN: CPT | Mod: 25 | Performed by: OTOLARYNGOLOGY

## 2020-01-09 PROCEDURE — 99999 PR OFFICE/OUTPT VISIT,PROCEDURE ONLY: CPT | Performed by: AUDIOLOGIST

## 2020-01-09 PROCEDURE — 69210 REMOVE IMPACTED EAR WAX UNI: CPT | Performed by: OTOLARYNGOLOGY

## 2020-01-09 PROCEDURE — 92567 TYMPANOMETRY: CPT | Performed by: AUDIOLOGIST

## 2020-01-09 PROCEDURE — 92557 COMPREHENSIVE HEARING TEST: CPT | Performed by: AUDIOLOGIST

## 2020-01-09 RX ORDER — AMLODIPINE BESYLATE 5 MG/1
TABLET ORAL DAILY
COMMUNITY

## 2020-01-09 NOTE — PROCEDURES
Procedures  Cerumen Removal-   The right ear was examined under the otomicroscope and noted to have cerumen impaction obstruting adequate visualization of the tympanic membrane.  Using a combination of suction, curettes, alligator and micro instruments, the cerumen was successfully extracted.  At the conclusion of the procedure the EAC was noted to be healthy and intact without evidence of bleeding or trama. The tympanic membrane appeared intact, with normal landmarks and no evidence of effusion

## 2020-01-09 NOTE — PROGRESS NOTES
[facility]  [address], [address]  [place] [address]  Phone: [phone]  Fax: [phone]      Patient ID: Efrain Cardona Jr.                              : 6/10/1934    Visit Date: 2020  Referring Provider: Alex Elliott MD    Chief Complaint: decreased hearing bilat      Efrain Cardona Jr. is a 85 y.o.  male who presents with subjective hearing loss.  He states a few weeks ago he felt that his hearing in his left ear dropped associated withupper respiratory tract infection.  Afterwards the after the infection resolved his hearing seem to have improved back to his baseline.  However he feels like both ears for some time years have been decreased in hearing.  He has trouble hearing his wife, as well as when he is in movies he has trouble distinguishing words.  His discrimination seems to be more of a problem than hearing the actual sound in his experience.  Denies dizziness he also reports steady mid frequency tinnitus    Review of Systems   HENT: Positive for hearing loss and tinnitus.         Decreased smell   All other systems reviewed and are negative.      Current Medications: has a current medication list which includes the following prescription(s): allopurinol, amlodipine, amoxicillin, diphenoxylate-atropine, ferrous sulfate, finasteride, hydrochlorothiazide, lisinopril, magnesium, metoprolol succinate xl, omeprazole, and simvastatin.    Past Medical History:   Past Medical History:   Diagnosis Date   • Abnormal EKG    • Arrhythmia    • Chronic kidney disease    • Disease of thyroid gland        • Diverticulitis of colon     Diverticulosis   • GI (gastrointestinal bleed)     X3   last    • Heart murmur    • Hyperlipidemia    • Hypertension    • Infectious viral hepatitis    • Lipid disorder    • Renal failure        Past Surgical History:   Past Surgical History:   Procedure Laterality Date   • HERNIA REPAIR      2001   • KIDNEY SURGERY Left     kidney  "cancer       Social History:  reports that he quit smoking about 50 years ago. He has never used smokeless tobacco. He reports that he drinks alcohol. He reports that he does not use drugs.    Family History:   Family History   Problem Relation Age of Onset   • Lung cancer Biological Mother    • Leukemia Biological Father            Allergies: Griseofulvin and Tetracycline    Physical Exam:  Vitals:   Visit Vitals  /64 (BP Location: Left upper arm, Patient Position: Sitting)   Pulse 63   Ht 1.626 m (5' 4\")   Wt 67.1 kg (148 lb)   SpO2 98%   BMI 25.40 kg/m²     General:  Well-developed, well-nourished 85 y.o. malein no acute distress.  Voice: Normal without hoarseness, breathiness or stridor.  Head/face:  No scars or lesions.  No parotid masses. No presinus tenderness. Symmetric, normal facies.  Eyes:  Extraocular movements and gaze alignment normal.  Ears: Auricles normal.  External auditory canals free of cerumen on the left obstructed with cerumen on the right.  Tympanic membranes clear, mobile and without retraction or scar.  Nose:  Dorsum straight.  Septum midline.  Turbinates normal in size and orientation.  No pus, polyps or crusts.  Oral Cavity/oropharynx:  Normal tongue thrust. Normal gag reflex. No masses, leukoplakia, erythroplakia, ulcers or other abnormalities at the tongue, floor of mouth, buccal mucosa, palate or posterior pharyngeal wall.       Neck:  No masses, adenopathy, cervical spasm or thyroid enlargement.  Cranial Nerves II through XII: Grossly intact.  Lungs: equal chest rise  Heart: Regular rate and rhythm.  Mental status: Awake, alert and oriented ×3.      Audiogram: Bilateral high-frequency sensorineural hearing loss moderate to severe very mild asymmetry left greater than right    Tympanogram: Bilateral normal tympanograms      Impression:  85 y.o.  male with Sensorineural hearing loss (SNHL) of both ears  (primary encounter diagnosis)    Impacted cerumen of right ear    Ulcerated " cerumen disimpaction.    Audiogram demonstrates bilateral normal tympanograms, and bilateral moderate sloping to severe high-frequency sensorineural hearing loss.  I recommend patient consider amplification with hearing aids.  Patient will call to schedule hearing aid evaluation at [place].  Problem List Items Addressed This Visit     None      Visit Diagnoses     Sensorineural hearing loss (SNHL) of both ears    -  Primary    Impacted cerumen of right ear              Eleanor Wang MD

## 2020-01-09 NOTE — LETTER
2020     Alex Elliott MD  [address] [address]    Patient: Efrain Cardona Jr.  YOB: 1934  Date of Visit: 2020      Dear Dr. Elliott:    Thank you for referring Efrain Cardona Jr. to me for evaluation. Below are my notes for this consultation.    If you have questions, please do not hesitate to call me. I look forward to following your patient along with you.         Sincerely,        Eleanor Wang MD        CC: No Recipients  Eleanor Wang MD  2020  3:11 PM  Signed      [facility]  [address], [address]  [place] [address]  Phone: [phone]  Fax: [phone]      Patient ID: Efrain Cardona Jr.                              : 6/10/1934    Visit Date: 2020  Referring Provider: Alex Elliott MD    Chief Complaint: decreased hearing bilat      Efrain Cardona Jr. is a 85 y.o.  male who presents with subjective hearing loss.  He states a few weeks ago he felt that his hearing in his left ear dropped associated withupper respiratory tract infection.  Afterwards the after the infection resolved his hearing seem to have improved back to his baseline.  However he feels like both ears for some time years have been decreased in hearing.  He has trouble hearing his wife, as well as when he is in movies he has trouble distinguishing words.  His discrimination seems to be more of a problem than hearing the actual sound in his experience.  Denies dizziness he also reports steady mid frequency tinnitus    Review of Systems   HENT: Positive for hearing loss and tinnitus.         Decreased smell   All other systems reviewed and are negative.      Current Medications: has a current medication list which includes the following prescription(s): allopurinol, amlodipine, amoxicillin, diphenoxylate-atropine, ferrous sulfate, finasteride, hydrochlorothiazide, lisinopril, magnesium, metoprolol succinate xl,  "omeprazole, and simvastatin.    Past Medical History:   Past Medical History:   Diagnosis Date   • Abnormal EKG    • Arrhythmia    • Chronic kidney disease    • Disease of thyroid gland     1991   • Diverticulitis of colon     Diverticulosis   • GI (gastrointestinal bleed)     X3   last 2015   • Heart murmur    • Hyperlipidemia    • Hypertension    • Infectious viral hepatitis    • Lipid disorder    • Renal failure        Past Surgical History:   Past Surgical History:   Procedure Laterality Date   • HERNIA REPAIR      7/2001   • KIDNEY SURGERY Left     kidney cancer       Social History:  reports that he quit smoking about 50 years ago. He has never used smokeless tobacco. He reports that he drinks alcohol. He reports that he does not use drugs.    Family History:   Family History   Problem Relation Age of Onset   • Lung cancer Biological Mother    • Leukemia Biological Father            Allergies: Griseofulvin and Tetracycline    Physical Exam:  Vitals:   Visit Vitals  /64 (BP Location: Left upper arm, Patient Position: Sitting)   Pulse 63   Ht 1.626 m (5' 4\")   Wt 67.1 kg (148 lb)   SpO2 98%   BMI 25.40 kg/m²     General:  Well-developed, well-nourished 85 y.o. malein no acute distress.  Voice: Normal without hoarseness, breathiness or stridor.  Head/face:  No scars or lesions.  No parotid masses. No presinus tenderness. Symmetric, normal facies.  Eyes:  Extraocular movements and gaze alignment normal.  Ears: Auricles normal.  External auditory canals free of cerumen on the left obstructed with cerumen on the right.  Tympanic membranes clear, mobile and without retraction or scar.  Nose:  Dorsum straight.  Septum midline.  Turbinates normal in size and orientation.  No pus, polyps or crusts.  Oral Cavity/oropharynx:  Normal tongue thrust. Normal gag reflex. No masses, leukoplakia, erythroplakia, ulcers or other abnormalities at the tongue, floor of mouth, buccal mucosa, palate or posterior pharyngeal wall. "       Neck:  No masses, adenopathy, cervical spasm or thyroid enlargement.  Cranial Nerves II through XII: Grossly intact.  Lungs: equal chest rise  Heart: Regular rate and rhythm.  Mental status: Awake, alert and oriented ×3.      Audiogram: Bilateral high-frequency sensorineural hearing loss moderate to severe very mild asymmetry left greater than right    Tympanogram: Bilateral normal tympanograms      Impression:  85 y.o.  male with Sensorineural hearing loss (SNHL) of both ears  (primary encounter diagnosis)    Impacted cerumen of right ear    Ulcerated cerumen disimpaction.    Audiogram demonstrates bilateral normal tympanograms, and bilateral moderate sloping to severe high-frequency sensorineural hearing loss.  I recommend patient consider amplification with hearing aids.  Patient will call to schedule hearing aid evaluation at [place].  Problem List Items Addressed This Visit     None      Visit Diagnoses     Sensorineural hearing loss (SNHL) of both ears    -  Primary    Impacted cerumen of right ear              MD Kathleen Bowens, Eleanor FUNK MD  1/9/2020  3:11 PM  Signed  Procedures  Cerumen Removal-   The right ear was examined under the otomicroscope and noted to have cerumen impaction obstruting adequate visualization of the tympanic membrane.  Using a combination of suction, curettes, alligator and micro instruments, the cerumen was successfully extracted.  At the conclusion of the procedure the EAC was noted to be healthy and intact without evidence of bleeding or trama. The tympanic membrane appeared intact, with normal landmarks and no evidence of effusion

## 2020-01-31 ENCOUNTER — OFFICE VISIT (OUTPATIENT)
Dept: CARDIOLOGY | Facility: CLINIC | Age: 85
End: 2020-01-31
Payer: MEDICARE

## 2020-01-31 ENCOUNTER — APPOINTMENT (OUTPATIENT)
Dept: LAB | Facility: CLINIC | Age: 85
End: 2020-01-31
Attending: INTERNAL MEDICINE
Payer: MEDICARE

## 2020-01-31 VITALS
BODY MASS INDEX: 25.87 KG/M2 | HEIGHT: 63 IN | WEIGHT: 146 LBS | SYSTOLIC BLOOD PRESSURE: 102 MMHG | DIASTOLIC BLOOD PRESSURE: 60 MMHG

## 2020-01-31 DIAGNOSIS — I10 ESSENTIAL HYPERTENSION: Chronic | ICD-10-CM

## 2020-01-31 DIAGNOSIS — M10.9 GOUT, UNSPECIFIED CAUSE, UNSPECIFIED CHRONICITY, UNSPECIFIED SITE: ICD-10-CM

## 2020-01-31 DIAGNOSIS — E78.2 MIXED HYPERLIPIDEMIA: Chronic | ICD-10-CM

## 2020-01-31 DIAGNOSIS — I35.1 NONRHEUMATIC AORTIC VALVE INSUFFICIENCY: Primary | Chronic | ICD-10-CM

## 2020-01-31 DIAGNOSIS — I49.49 PREMATURE BEATS: Chronic | ICD-10-CM

## 2020-01-31 LAB
ALBUMIN SERPL-MCNC: 4.1 G/DL (ref 3.4–5)
ALP SERPL-CCNC: 60 IU/L (ref 35–126)
ALT SERPL-CCNC: 17 IU/L (ref 16–63)
ANION GAP SERPL CALC-SCNC: 8 MEQ/L (ref 3–15)
AST SERPL-CCNC: 23 IU/L (ref 15–41)
BILIRUB SERPL-MCNC: 0.7 MG/DL (ref 0.3–1.2)
BUN SERPL-MCNC: 40 MG/DL (ref 8–20)
CALCIUM SERPL-MCNC: 9.4 MG/DL (ref 8.9–10.3)
CHLORIDE SERPL-SCNC: 105 MEQ/L (ref 98–109)
CHOLEST SERPL-MCNC: 143 MG/DL
CO2 SERPL-SCNC: 26 MEQ/L (ref 22–32)
CREAT SERPL-MCNC: 1.7 MG/DL
ERYTHROCYTE [DISTWIDTH] IN BLOOD BY AUTOMATED COUNT: 15.5 % (ref 11.6–14.4)
GFR SERPL CREATININE-BSD FRML MDRD: 38.6 ML/MIN/1.73M*2
GLUCOSE SERPL-MCNC: 94 MG/DL (ref 70–99)
HCT VFR BLDCO AUTO: 38.3 % (ref 40.1–51)
HDLC SERPL-MCNC: 64 MG/DL
HDLC SERPL: 2.2 {RATIO}
HGB BLD-MCNC: 12.5 G/DL
LDLC SERPL CALC-MCNC: 72 MG/DL
MAGNESIUM SERPL-MCNC: 1.9 MG/DL (ref 1.8–2.5)
MCH RBC QN AUTO: 35.1 PG (ref 28–33.2)
MCHC RBC AUTO-ENTMCNC: 32.6 G/DL (ref 32.2–36.5)
MCV RBC AUTO: 107.6 FL (ref 83–98)
NONHDLC SERPL-MCNC: 79 MG/DL
PDW BLD AUTO: 10.8 FL (ref 9.4–12.4)
PLATELET # BLD AUTO: 152 K/UL
POTASSIUM SERPL-SCNC: 4.9 MEQ/L (ref 3.6–5.1)
PROT SERPL-MCNC: 6.7 G/DL (ref 6–8.2)
RBC # BLD AUTO: 3.56 M/UL (ref 4.5–5.8)
SODIUM SERPL-SCNC: 139 MEQ/L (ref 136–144)
TRIGL SERPL-MCNC: 34 MG/DL (ref 30–149)
URATE SERPL-MCNC: 5.2 MG/DL (ref 4.8–8.2)
WBC # BLD AUTO: 5.37 K/UL

## 2020-01-31 PROCEDURE — 83735 ASSAY OF MAGNESIUM: CPT

## 2020-01-31 PROCEDURE — 36415 COLL VENOUS BLD VENIPUNCTURE: CPT

## 2020-01-31 PROCEDURE — 80061 LIPID PANEL: CPT

## 2020-01-31 PROCEDURE — 84550 ASSAY OF BLOOD/URIC ACID: CPT

## 2020-01-31 PROCEDURE — 80053 COMPREHEN METABOLIC PANEL: CPT

## 2020-01-31 PROCEDURE — 93000 ELECTROCARDIOGRAM COMPLETE: CPT | Performed by: INTERNAL MEDICINE

## 2020-01-31 PROCEDURE — 85027 COMPLETE CBC AUTOMATED: CPT

## 2020-01-31 PROCEDURE — 99214 OFFICE O/P EST MOD 30 MIN: CPT | Performed by: INTERNAL MEDICINE

## 2020-01-31 ASSESSMENT — ENCOUNTER SYMPTOMS
CONSTIPATION: 0
DECREASED APPETITE: 0
BLOATING: 0
DIZZINESS: 0
FREQUENCY: 0
ALLERGIC/IMMUNOLOGIC NEGATIVE: 1
DOUBLE VISION: 0
PSYCHIATRIC NEGATIVE: 1
SHORTNESS OF BREATH: 0
HOARSE VOICE: 0
IRREGULAR HEARTBEAT: 0
DIARRHEA: 0
HEADACHES: 0
DYSURIA: 0

## 2020-01-31 NOTE — PROGRESS NOTES
Cardiology Consult     Reason for visit: Scheduled cardiology follow-up    Chief Complaint   Patient presents with   • Follow-up   • Nonrheumatic Aortic Valve Insufiiciency   • Hypertension   • Hyperlipidemia   Systemic hypertension  Extrasystoles  Status post nephrectomy  Chronic renal insufficiency     HPI     Efrain Cardona Jr. is a 85 y.o. male  who presents for follow-up. Since last seen here, he has enjoyed good health.  With the reduction of the Norvasc dose and introduction of low-dose diuretics, his pedal edema has resolved.    He has not had chest pain.  His sense of extrasystoles or palpitations is been very minimal.  Good appetite.  Stable weight.  No neurologic symptoms.  Bowel habits are regular.  General health overall quite good.    He does follow his blood pressure routinely.  It has been well controlled recently.  We had a discussion in the office today about adjusting his regimen so that the number of medicines required to control his blood pressure is minimal.  Past Medical History:   Diagnosis Date   • Abnormal EKG    • Arrhythmia    • Chronic kidney disease    • Disease of thyroid gland     1991   • Diverticulitis of colon     Diverticulosis   • GI (gastrointestinal bleed)     X3   last 2015   • Heart murmur    • Hyperlipidemia    • Hypertension    • Infectious viral hepatitis    • Lipid disorder    • Renal failure      Past Surgical History:   Procedure Laterality Date   • HERNIA REPAIR      7/2001   • KIDNEY SURGERY Left     kidney cancer     Griseofulvin and Tetracycline  Current Outpatient Medications   Medication Sig Dispense Refill   • ALLOPURINOL ORAL Take 200 mg by mouth daily.       • amLODIPine (NORVASC) 5 mg tablet Take 5 mg by mouth daily.     • amoxicillin (AMOXIL) 500 mg capsule take 4 capsules by mouth 1 hour prior to appointment  0   • diphenoxylate-atropine (LOMOTIL) 2.5-0.025 mg per tablet Take 1 tablet by mouth daily as needed for diarrhea. 30 tablet 1   • ferrous  sulfate 325 mg (65 mg iron) ER capsule Takes as needed when hemoglobin in low      • finasteride (PROSCAR) 5 mg tablet Take 5 mg by mouth daily.     • hydrochlorothiazide (HYDRODIURIL) 12.5 mg tablet Take 12.5 mg by mouth 6 (six) times a week (Sun, Mon, Tue, Wed, Thu, Fri).       • lisinopril (PRINIVIL) 40 mg tablet 40 mg. Take 1.5 tablets by oral route every day     • MAGNESIUM ORAL Take 500 mg by mouth daily.       • metoprolol succinate XL (TOPROL-XL) 25 mg 24 hr tablet 25 mg. Take HALF tablet by oral route every day     • omeprazole (PriLOSEC) 20 mg capsule Take 20 mg by mouth once a week. 1 capsule daily 30 minutes to 1 hour before a meal       • psyllium husk (METAMUCIL ORAL) Take 2 capsules by mouth daily. Pt is unaware of dosage     • simvastatin (ZOCOR) 40 mg tablet Take 1 tablet by mouth daily.  0     No current facility-administered medications for this visit.      Social History     Socioeconomic History   • Marital status:      Spouse name: None   • Number of children: None   • Years of education: None   • Highest education level: None   Occupational History   • None   Social Needs   • Financial resource strain: None   • Food insecurity:     Worry: None     Inability: None   • Transportation needs:     Medical: None     Non-medical: None   Tobacco Use   • Smoking status: Former Smoker     Last attempt to quit: 1970     Years since quittin.1   • Smokeless tobacco: Never Used   • Tobacco comment: PIPE    Substance and Sexual Activity   • Alcohol use: Yes     Comment: wine, daily   • Drug use: No   • Sexual activity: Defer   Lifestyle   • Physical activity:     Days per week: None     Minutes per session: None   • Stress: None   Relationships   • Social connections:     Talks on phone: None     Gets together: None     Attends Congregation service: None     Active member of club or organization: None     Attends meetings of clubs or organizations: None     Relationship status: None   • Intimate  partner violence:     Fear of current or ex partner: None     Emotionally abused: None     Physically abused: None     Forced sexual activity: None   Other Topics Concern   • None   Social History Narrative   • None     Family History   Problem Relation Age of Onset   • Lung cancer Biological Mother    • Leukemia Biological Father         Review of Systems   Constitution: Negative for decreased appetite.   HENT: Negative for hearing loss and hoarse voice.    Eyes: Negative for double vision.   Cardiovascular: Positive for leg swelling. Negative for chest pain and irregular heartbeat.   Respiratory: Negative for shortness of breath.    Hematologic/Lymphatic: Negative for bleeding problem.   Skin: Negative for rash.   Musculoskeletal: Positive for joint pain.   Gastrointestinal: Negative for bloating, constipation and diarrhea.   Genitourinary: Negative for dysuria and frequency.   Neurological: Negative for dizziness and headaches.   Psychiatric/Behavioral: Negative.    Allergic/Immunologic: Negative.    All other systems reviewed and are negative.     Objective   Vitals:    01/31/20 1033   BP: 102/60   Weight 146 pounds    Physical Exam   Constitutional: He is oriented to person, place, and time. He appears well-nourished. No distress.   Pleasant fit appearing man not acutely distressed.   HENT:   Head: Normocephalic and atraumatic.   Nose: Nose normal.   Eyes: Pupils are equal, round, and reactive to light. Conjunctivae are normal. No scleral icterus.   Neck: Normal range of motion. Neck supple. No JVD present. No thyromegaly present.   Cardiovascular: Normal rate, regular rhythm and intact distal pulses. Exam reveals no gallop.   Murmur (Soft systolic murmur along the right sternal border.  No diastolic murmur heard at rest or provoked with maneuver.) heard.  No orthostatic change of the blood pressure.   Pulmonary/Chest: Effort normal and breath sounds normal. He has no wheezes. He has no rales.   Abdominal:  Soft. Bowel sounds are normal. There is no tenderness. There is no rebound.   Musculoskeletal: Normal range of motion. He exhibits no edema or deformity.   Mild scoliosis of the lumbar spine.   Neurological: He is alert and oriented to person, place, and time.   Skin: Skin is warm and dry. No rash noted.   Psychiatric: He has a normal mood and affect. His behavior is normal. Judgment and thought content normal.   Nursing note and vitals reviewed.      Labs   Lab Results   Component Value Date    WBC 5.42 09/18/2019    HGB 12.7 (L) 09/18/2019    HCT 38.0 (L) 09/18/2019     09/18/2019    CHOL 172 04/18/2019    TRIG 52 04/18/2019    HDL 52 04/18/2019    ALT 18 09/18/2019    AST 23 09/18/2019     09/18/2019    K 4.6 09/18/2019     09/18/2019    CREATININE 1.7 (H) 09/18/2019    BUN 39 (H) 09/18/2019    CO2 24 09/18/2019    TSH 1.61 11/13/2018    PSA 2.02 08/26/2015    INR 1.0 09/22/2015    HGBA1C 4.8 08/26/2015       ECG        Assessment and plan:    1.  Occasional extrasystoles on EKG. Resolved.     We will continue to survey this problem with future visits and EKGs and see if there is a recurrence with elimination of the beta-blocker.    2.  Hypertension.  Outstanding control overall.  I do think it is safe however to consider adjusting his regimen to try to minimize the number of medications to gain the proper management of his blood pressure required.  To this end, we have decided to taper his very low-dose beta-blocker therapy to off and see what happens with his blood pressure which he monitors regularly.  Should his blood pressure rise or should his extrasystoles or sensation of palpitations return, the beta-blocker could be easily reinstituted.  He will remain on Prinivil, HCTZ and low-dose amlodipine.    3.  Aortic insufficiency.  No change in the murmur on exam.  No symptoms or exam findings of congestive heart failure.    4.  Single kidney status post nephrectomy.  Stable creatinine as of  recent laboratory studies.  The creatinine was 1.7 in September.  Labs are to be repeated in the next a few days.    5.  Gastrointestinal bleeding secondary to aspirin.  No clear etiology found despite aggressive GI workup.  He is off aspirin and his hemoglobin is remained stable.    6.  History of thyroiditis.  Recent laboratory studies reveal normal TSH.    In summary then, I believe  He enjoys overall excellent health and certainly excellent cardiovascular health.  He is committed to exercise and I see no reason to limit her changes program.      I hope to see him in 6 months in follow-up to today's visit but will be happy to see him sooner.  I certainly encouraged him to call me if there is any change in the pattern of palpitations or other symptoms that may be related to a change in his cardiovascular status.        Timoteo Kim MD  1/31/2020

## 2020-01-31 NOTE — LETTER
January 31, 2020     Alex Elliott MD  [address]  [facility] [address]    Patient: Efrain Cardona Jr.  YOB: 1934  Date of Visit: 1/31/2020      Dear Dr. Elliott:    Thank you for referring Efrain Cardona Jr. to me for evaluation. Below are my notes for this consultation.    If you have questions, please do not hesitate to call me. I look forward to following your patient along with you.         Sincerely,        Timoteo Kim MD        CC: MD Charles Estes MD Coady, Paul M, MD  1/31/2020 11:07 AM  Sign at close encounter      Cardiology Consult     Reason for visit: Scheduled cardiology follow-up    Chief Complaint   Patient presents with   • Follow-up   • Nonrheumatic Aortic Valve Insufiiciency   • Hypertension   • Hyperlipidemia   Systemic hypertension  Extrasystoles  Status post nephrectomy  Chronic renal insufficiency     HPI     Efrain Cardona Jr. is a 85 y.o. male  who presents for follow-up. Since last seen here, he has enjoyed good health.  With the reduction of the Norvasc dose and introduction of low-dose diuretics, his pedal edema has resolved.    He has not had chest pain.  His sense of extrasystoles or palpitations is been very minimal.  Good appetite.  Stable weight.  No neurologic symptoms.  Bowel habits are regular.  General health overall quite good.    He does follow his blood pressure routinely.  It has been well controlled recently.  We had a discussion in the office today about adjusting his regimen so that the number of medicines required to control his blood pressure is minimal.  Past Medical History:   Diagnosis Date   • Abnormal EKG    • Arrhythmia    • Chronic kidney disease    • Disease of thyroid gland     1991   • Diverticulitis of colon     Diverticulosis   • GI (gastrointestinal bleed)     X3   last 2015   • Heart murmur    • Hyperlipidemia    • Hypertension    • Infectious viral hepatitis    • Lipid  disorder    • Renal failure      Past Surgical History:   Procedure Laterality Date   • HERNIA REPAIR      7/2001   • KIDNEY SURGERY Left     kidney cancer     Griseofulvin and Tetracycline  Current Outpatient Medications   Medication Sig Dispense Refill   • ALLOPURINOL ORAL Take 200 mg by mouth daily.       • amLODIPine (NORVASC) 5 mg tablet Take 5 mg by mouth daily.     • amoxicillin (AMOXIL) 500 mg capsule take 4 capsules by mouth 1 hour prior to appointment  0   • diphenoxylate-atropine (LOMOTIL) 2.5-0.025 mg per tablet Take 1 tablet by mouth daily as needed for diarrhea. 30 tablet 1   • ferrous sulfate 325 mg (65 mg iron) ER capsule Takes as needed when hemoglobin in low      • finasteride (PROSCAR) 5 mg tablet Take 5 mg by mouth daily.     • hydrochlorothiazide (HYDRODIURIL) 12.5 mg tablet Take 12.5 mg by mouth 6 (six) times a week (Sun, Mon, Tue, Wed, Thu, Fri).       • lisinopril (PRINIVIL) 40 mg tablet 40 mg. Take 1.5 tablets by oral route every day     • MAGNESIUM ORAL Take 500 mg by mouth daily.       • metoprolol succinate XL (TOPROL-XL) 25 mg 24 hr tablet 25 mg. Take HALF tablet by oral route every day     • omeprazole (PriLOSEC) 20 mg capsule Take 20 mg by mouth once a week. 1 capsule daily 30 minutes to 1 hour before a meal       • psyllium husk (METAMUCIL ORAL) Take 2 capsules by mouth daily. Pt is unaware of dosage     • simvastatin (ZOCOR) 40 mg tablet Take 1 tablet by mouth daily.  0     No current facility-administered medications for this visit.      Social History     Socioeconomic History   • Marital status:      Spouse name: None   • Number of children: None   • Years of education: None   • Highest education level: None   Occupational History   • None   Social Needs   • Financial resource strain: None   • Food insecurity:     Worry: None     Inability: None   • Transportation needs:     Medical: None     Non-medical: None   Tobacco Use   • Smoking status: Former Smoker     Last attempt  to quit: 1970     Years since quittin.1   • Smokeless tobacco: Never Used   • Tobacco comment: PIPE    Substance and Sexual Activity   • Alcohol use: Yes     Comment: wine, daily   • Drug use: No   • Sexual activity: Defer   Lifestyle   • Physical activity:     Days per week: None     Minutes per session: None   • Stress: None   Relationships   • Social connections:     Talks on phone: None     Gets together: None     Attends Yazdanism service: None     Active member of club or organization: None     Attends meetings of clubs or organizations: None     Relationship status: None   • Intimate partner violence:     Fear of current or ex partner: None     Emotionally abused: None     Physically abused: None     Forced sexual activity: None   Other Topics Concern   • None   Social History Narrative   • None     Family History   Problem Relation Age of Onset   • Lung cancer Biological Mother    • Leukemia Biological Father         Review of Systems   Constitution: Negative for decreased appetite.   HENT: Negative for hearing loss and hoarse voice.    Eyes: Negative for double vision.   Cardiovascular: Positive for leg swelling. Negative for chest pain and irregular heartbeat.   Respiratory: Negative for shortness of breath.    Hematologic/Lymphatic: Negative for bleeding problem.   Skin: Negative for rash.   Musculoskeletal: Positive for joint pain.   Gastrointestinal: Negative for bloating, constipation and diarrhea.   Genitourinary: Negative for dysuria and frequency.   Neurological: Negative for dizziness and headaches.   Psychiatric/Behavioral: Negative.    Allergic/Immunologic: Negative.    All other systems reviewed and are negative.     Objective   Vitals:    20 1033   BP: 102/60   Weight 146 pounds    Physical Exam   Constitutional: He is oriented to person, place, and time. He appears well-nourished. No distress.   Pleasant fit appearing man not acutely distressed.   HENT:   Head: Normocephalic and  atraumatic.   Nose: Nose normal.   Eyes: Pupils are equal, round, and reactive to light. Conjunctivae are normal. No scleral icterus.   Neck: Normal range of motion. Neck supple. No JVD present. No thyromegaly present.   Cardiovascular: Normal rate, regular rhythm and intact distal pulses. Exam reveals no gallop.   Murmur (Soft systolic murmur along the right sternal border.  No diastolic murmur heard at rest or provoked with maneuver.) heard.  No orthostatic change of the blood pressure.   Pulmonary/Chest: Effort normal and breath sounds normal. He has no wheezes. He has no rales.   Abdominal: Soft. Bowel sounds are normal. There is no tenderness. There is no rebound.   Musculoskeletal: Normal range of motion. He exhibits no edema or deformity.   Mild scoliosis of the lumbar spine.   Neurological: He is alert and oriented to person, place, and time.   Skin: Skin is warm and dry. No rash noted.   Psychiatric: He has a normal mood and affect. His behavior is normal. Judgment and thought content normal.   Nursing note and vitals reviewed.      Labs   Lab Results   Component Value Date    WBC 5.42 09/18/2019    HGB 12.7 (L) 09/18/2019    HCT 38.0 (L) 09/18/2019     09/18/2019    CHOL 172 04/18/2019    TRIG 52 04/18/2019    HDL 52 04/18/2019    ALT 18 09/18/2019    AST 23 09/18/2019     09/18/2019    K 4.6 09/18/2019     09/18/2019    CREATININE 1.7 (H) 09/18/2019    BUN 39 (H) 09/18/2019    CO2 24 09/18/2019    TSH 1.61 11/13/2018    PSA 2.02 08/26/2015    INR 1.0 09/22/2015    HGBA1C 4.8 08/26/2015   Gaseousness x90    ECG   .       Assessment and plan:    1.  Occasional extrasystoles on EKG. Resolved.     We will continue to survey this problem with future visits and EKGs and see if there is a recurrence with elimination of the beta-blocker.    2.  Hypertension.  Outstanding control overall.  I do think it is safe however to consider adjusting his regimen to try to minimize the number of  medications to gain the proper management of his blood pressure required.  To this end, we have decided to taper his very low-dose beta-blocker therapy to off and see what happens with his blood pressure which he monitors regularly.  Should his blood pressure rise or should his extrasystoles or sensation of palpitations return, the beta-blocker could be easily reinstituted.  He will remain on Prinivil, HCTZ and low-dose amlodipine.    3.  Aortic insufficiency.  No change in the murmur on exam.  No symptoms or exam findings of congestive heart failure.    4.  Single kidney status post nephrectomy.  Stable creatinine as of recent laboratory studies.  The creatinine was 1.7 in September.  Labs are to be repeated in the next a few days.    5.  Gastrointestinal bleeding secondary to aspirin.  No clear etiology found despite aggressive GI workup.  He is off aspirin and his hemoglobin is remained stable.    6.  History of thyroiditis.  Recent laboratory studies reveal normal TSH.    In summary then, I believe  He enjoys overall excellent health and certainly excellent cardiovascular health.  He is committed to exercise and I see no reason to limit her changes program.      I hope to see him in 6 months in follow-up to today's visit but will be happy to see him sooner.  I certainly encouraged him to call me if there is any change in the pattern of palpitations or other symptoms that may be related to a change in his cardiovascular status.        Timoteo Kim MD  1/31/2020

## 2020-02-04 ENCOUNTER — TELEPHONE (OUTPATIENT)
Dept: PRIMARY CARE | Facility: CLINIC | Age: 85
End: 2020-02-04

## 2020-02-04 NOTE — TELEPHONE ENCOUNTER
----- Message from Alex Elliott MD sent at 2/3/2020 12:51 PM EST -----  Please call Dr. Hampton I would let him know his cholesterol studies are excellent his renal function is stable creatinine 1.7 and he is not significantly anemic.  Thank you

## 2020-03-11 ENCOUNTER — OFFICE VISIT (OUTPATIENT)
Dept: PRIMARY CARE | Facility: CLINIC | Age: 85
End: 2020-03-11
Payer: MEDICARE

## 2020-03-11 DIAGNOSIS — E78.2 MIXED HYPERLIPIDEMIA: Chronic | ICD-10-CM

## 2020-03-11 DIAGNOSIS — I10 ESSENTIAL HYPERTENSION: Primary | Chronic | ICD-10-CM

## 2020-03-11 DIAGNOSIS — M10.9 GOUT, UNSPECIFIED CAUSE, UNSPECIFIED CHRONICITY, UNSPECIFIED SITE: ICD-10-CM

## 2020-03-11 DIAGNOSIS — N18.30 CKD (CHRONIC KIDNEY DISEASE) STAGE 3, GFR 30-59 ML/MIN (CMS/HCC): ICD-10-CM

## 2020-03-11 PROCEDURE — 99214 OFFICE O/P EST MOD 30 MIN: CPT | Performed by: INTERNAL MEDICINE

## 2020-03-11 ASSESSMENT — ENCOUNTER SYMPTOMS
COUGH: 0
SHORTNESS OF BREATH: 0
FEVER: 0
HEMATURIA: 0
ABDOMINAL PAIN: 0
PALPITATIONS: 0
CHILLS: 0

## 2020-03-11 NOTE — ASSESSMENT & PLAN NOTE
Stable blood pressure control with discontinue Toprol and increased hydrochlorothiazide dosing.  Continue current treatment.  Check next CMP in June.

## 2020-03-11 NOTE — PROGRESS NOTES
Alex Elliott MD  Geriatric Medicine                                                    GERIATRIC MEDICINE    Subjective      Patient ID: Efrain Cardona Jr. is a 85 y.o. male.    Disease Management Visit    The patient is here for an acute problem and for disease Management for evaluation and management of the following chronic medical problems.    This includes: symptom review, medication regimen review, laboratory monitoring, and review of diagnostic testing.      Hypertension  --- Update 3/11/2020: Blood pressure stable.  His cardiologist dropped his Toprol.  Hydrochlorothiazide was increased to daily dosing.  This simplified his regimen.  He is well controlled at home he is running 110-130/80  --- Update 11/6/2019: Patient is concerned because he has hypertension on morning blood pressure check in the range of 160/90 but by afternoon it is down to 120/80.  At today's visit he is 136/80.  His cardiologist had previously addressed this concern and was not concerned about it.  --- Update 7/10/2019: Stable on current regimen.  Note he is successfully weaned off Norvasc with addition of hydrochlorothiazide and his blood pressure is tolerating hydrochlorothiazide dose reduction to 12.5 g daily  --- Update 3/4/18: Blood pressure stable with medication change with reduction of Norvasc to 5 mg in addition of hydrochlorothiazide 25 mg Monday Wednesday Friday.  --- Update 6/25/18: Stable.  ---update 2/26/18: Stable; still occasional edema on hot days from amlodipine but he is tolerating it.  ---Update 10/30/17: home BP stable.  --- Update 7/5/17: Home BP tracking stable--however he reports some variability  --- Previous visits: BP is stable on dose reduction of Norvasc and a small dose of Toprol --summertime edema from Norvasc.  --- Previous visit:Patient notes that his home blood pressure monitoring is all from 10-20 points higher than what we check in the office.  He reports running in the systolic range of  125-140 at home.  --- Patient asked about switching from Vasotec to lisinopril do to major cost advantages.  He also notes summertime edema from Norvasc.  Medications       ((( Toprol 12.5 mg daily )))   -by cardiologist 1/31/2020  Norvasc 5 mg daily   Lisinopril 30 mg daily  Hydrochlorothiazide 12.5 mg   daily       (((   ASA   )))  --stopped due to study from [place] recommending against ASA for primary prevention    Anemia  -He has recurrent iron deficiency anemia.  --- 3/11/2020: This is resolved with most recent hemoglobin on 1/31/2020 of 12.5  --- Update 11/6/2019: Hemoglobin was normal at 12.7 on most recent check which was 9/18/2019.  --- Date 7/10/2019: Hemoglobin on 4/18/2019 was normal at 13.5.  No evidence of recurrent GI bleeding.  --- Update 3/4/19: Hemoglobin was normal on last check 11/13/18 at 14.7  --- Update 6/25/18: Stable.  Recent hemoglobin has been normal most recently 3/20/18 was 14.5.  This is normalized since he stopped aspirin.  ---update 2/26/18: EGD done by GI this summer was negative.He is due for follow-up CBC next month.  Already ordered.  --- Update 7/5/17: Patient has had no signs of GI bleeding.  --- Mosier to be due to AVM somewhere in his GI tract causing occult GI bleeding.  ---His recent hemoglobin was 9/19/17 Hgb 14.3  -off ASA  2/15/17 hemoglobin was 13.1  8/18/16 was 14.1  5/25/16 was 12.8  12/30/15 was 14.1  6/30/15 was 13.9  ---Previous visit:  He has since stopped his iron for his capsule endoscopy study which was done approximately 3 weeks ago to evaluate colonic and gastric AVMs as well as the small bowel for source of blood loss.----Patient reports that his capsule endoscopy study was negative. GI recommended stopping aspirin.  ---His gastroenterologist feels this is most likely do to either gastric or colonic AVMs  Last EGD and colonoscopy were done in July 2014.    Hypercholesterolemia  -Goal of treatment is LDL around 100  Primary prevention strategy  Last lipids  ---  Update 3/11/2020: Most recent lipid profile on 1/31/2020 was excellent: Total cholesterol 143 HDL 64 LDL 72  4/18/2019: Total cholesterol 172 HDL 52   9/19/17:    HDL 71  LDL 92  2/15/17: Total cholesterol 151 HDL 69 LDL 77  5/25/16: Total cholesterol 168 HDL 72 LDL 81  8/26/15: Total cholesterol 198 HDL 71   11/24/14:    HDL 79  LDL 84  Medications  Zocor 40 mg daily    GERD  --- Update 11/6/2019: Patient reports that he takes his PPI about 5 days/week.  In light of his osteoporosis and hypomagnesemia we discussed possibly switching to an H2 blocker such as ranitidine.  We discussed recommended trial of ranitidine 150 mg daily at bedtime.  He may check with his GI specialist Dr. Rollins about this recommendation.  As above he has 2 of the complications that PPI cause including accelerated osteoporosis and hypomagnesemia.  Medications  Prilosec 20 mg daily    BPH  Medication  Proscar 5 mg daily    Gout  --- Date 3/11/20: Uric acid remains less than 6 on hydrochlorothiazide.  --- Update 11/6/2019: Stable without recent attack  --- Update 7/10/2019: Patient is tolerating hydrochlorothiazide without any episodes of gout.  --- Update 2/26/18: No recent attacks of gout  Last uric acid 9/19/17 was 4.6  6/30/15 was 4.1  Medications  Allopurinol 200 mg daily  (Prednisone 20-30 mg when necessary with a 2-3 day taper for acute attacks--- patient initiates treatment himself with this regimen)    Chronic kidney disease  ---s/p Left Nephrectomy 2006  -Transitional Cell Ca  Creatinine ranges 1.4-1.8  Most recent creatinine on 9/18/2019 was stable at 1.7  5/16/2019 was stable at 1.8  11/13/18 was 1.3  -this is the best it has been in years.  3/20/18 was 1.5  6/29/17 was 1.6  2/15/17 was 1.6  8/18/16 was 1.6  5/25/16 was 1.7  12/30/15 was 1.4  4/1/15  was 1.6    History of colon polyp    History of intermittent diverticulitis  -Treated with Augmentin or goes to Cipro/Flagyl if persistent symptoms    Low  back pain  -Chronic degenerative scoliosis    Hypomagnesemia  --Patient takes an OTC magnesium supplement.  -------------------    Acute problem August 2016 -- diplopia  Progress note:  Over the last 6 months she has had at least 3 episodes of very brief diplopia lasting 1-2 minutes.  He reports if he shuts either eye the sensation is eliminated. With both eyes open it occurs.  This resolves spontaneously within 2 minutes.  Patient denies any other associated neurological symptoms including: He has not had weakness, fatigue, dizziness, speech trouble, etc....  Follow-up: Patient saw his ophthalmologist who examined him and ordered an MRI of the brain which was unremarkable and carotid Doppler study which was normal. He suspected that he had transient extraocular muscle weakness for unclear reasons.  He has not had a recurrence of his symptoms since July 2016.    Hosp ER Eval  9/22/15:  non-spec ABD Pain w normal CT ABD.    -------------------    Preventive health care and Screening summary:   UPDATE  3/6/17  Colonoscopy  -August 2014  Flu vaccine  2014; 2015 HD  -pharm 12/16/15 for HD; high-dose 12/5/16  Pneumovax  Booster 2011; Prevnar 13 given 2/23/15  dT Booster  Tdap March 2012  Zostavax  --patient did not get this from the pharmacy as ordered and now he wants to await the second generation Zostavax from a different  which may have better efficacy--possibly coming later in 2017  PSA  -2.02  8/26/15--note patient is now over the age of 81 so that screening PSA no longer recommended      The following have been reviewed and updated as appropriate in this visit:         Past Medical History:   Diagnosis Date   • Abnormal EKG    • Arrhythmia    • Chronic kidney disease    • Disease of thyroid gland     1991   • Diverticulitis of colon     Diverticulosis   • GI (gastrointestinal bleed)     X3   last 2015   • Heart murmur    • Hyperlipidemia    • Hypertension    • Infectious viral hepatitis    • Lipid  disorder    • Renal failure        Past Surgical History:   Procedure Laterality Date   • HERNIA REPAIR      2001   • KIDNEY SURGERY Left     kidney cancer       Family History   Problem Relation Age of Onset   • Lung cancer Biological Mother    • Leukemia Biological Father        Social History     Socioeconomic History   • Marital status:      Spouse name: Not on file   • Number of children: Not on file   • Years of education: Not on file   • Highest education level: Not on file   Occupational History   • Not on file   Social Needs   • Financial resource strain: Not on file   • Food insecurity:     Worry: Not on file     Inability: Not on file   • Transportation needs:     Medical: Not on file     Non-medical: Not on file   Tobacco Use   • Smoking status: Former Smoker     Last attempt to quit: 1970     Years since quittin.2   • Smokeless tobacco: Never Used   • Tobacco comment: PIPE    Substance and Sexual Activity   • Alcohol use: Yes     Comment: wine, daily   • Drug use: No   • Sexual activity: Defer   Lifestyle   • Physical activity:     Days per week: Not on file     Minutes per session: Not on file   • Stress: Not on file   Relationships   • Social connections:     Talks on phone: Not on file     Gets together: Not on file     Attends Zoroastrian service: Not on file     Active member of club or organization: Not on file     Attends meetings of clubs or organizations: Not on file     Relationship status: Not on file   • Intimate partner violence:     Fear of current or ex partner: Not on file     Emotionally abused: Not on file     Physically abused: Not on file     Forced sexual activity: Not on file   Other Topics Concern   • Not on file   Social History Narrative   • Not on file       Allergies   Allergen Reactions   • Griseofulvin      Other reaction(s): UNKNOWN  fulvicin    • Tetracycline Rash and Other (see comments)     minocin        Current Outpatient Medications   Medication Sig  Dispense Refill   • ALLOPURINOL ORAL Take 200 mg by mouth daily.       • amLODIPine (NORVASC) 5 mg tablet Take 5 mg by mouth daily.     • finasteride (PROSCAR) 5 mg tablet Take 5 mg by mouth daily.     • hydrochlorothiazide (HYDRODIURIL) 12.5 mg tablet Take 12.5 mg by mouth daily.       • lisinopril (PRINIVIL) 40 mg tablet 40 mg. Take 1.5 tablets by oral route every day     • MAGNESIUM ORAL Take 500 mg by mouth daily.       • omeprazole (PriLOSEC) 20 mg capsule Take 20 mg by mouth once a week. 1 capsule daily 30 minutes to 1 hour before a meal       • simvastatin (ZOCOR) 40 mg tablet Take 1 tablet by mouth daily.  0   • amoxicillin (AMOXIL) 500 mg capsule take 4 capsules by mouth 1 hour prior to appointment  0   • diphenoxylate-atropine (LOMOTIL) 2.5-0.025 mg per tablet Take 1 tablet by mouth daily as needed for diarrhea. 30 tablet 1   • ferrous sulfate 325 mg (65 mg iron) ER capsule Takes as needed when hemoglobin in low      • metoprolol succinate XL (TOPROL-XL) 25 mg 24 hr tablet 25 mg. Take HALF tablet by oral route every day     • psyllium husk (METAMUCIL ORAL) Take 2 capsules by mouth daily. Pt is unaware of dosage       No current facility-administered medications for this visit.        Review of Systems   Constitutional: Negative for chills and fever.   Respiratory: Negative for cough and shortness of breath.    Cardiovascular: Negative for chest pain and palpitations.        Palpitations and premature beats have resolved completely it appears.   Gastrointestinal: Negative for abdominal pain.   Genitourinary: Negative for hematuria.       Objective     There were no vitals filed for this visit.    Physical Exam   Constitutional: He appears well-developed and well-nourished.   HENT:   Head: Normocephalic.   Cardiovascular: Normal rate, regular rhythm and normal heart sounds.   Pulmonary/Chest: Effort normal and breath sounds normal. No respiratory distress. He has no wheezes. He has no rales.   Abdominal:  Soft. Bowel sounds are normal. There is no tenderness.   Musculoskeletal: Normal range of motion. He exhibits edema.   Trace edema   Neurological: He is alert.       Lab Results   Component Value Date    WBC 5.37 01/31/2020    HGB 12.5 (L) 01/31/2020    HCT 38.3 (L) 01/31/2020    .6 (H) 01/31/2020     01/31/2020         Chemistry        Component Value Date/Time     01/31/2020 1139    K 4.9 01/31/2020 1139     01/31/2020 1139    CO2 26 01/31/2020 1139    BUN 40 (H) 01/31/2020 1139    CREATININE 1.7 (H) 01/31/2020 1139        Component Value Date/Time    CALCIUM 9.4 01/31/2020 1139    ALKPHOS 60 01/31/2020 1139    AST 23 01/31/2020 1139    ALT 17 01/31/2020 1139    BILITOT 0.7 01/31/2020 1139            Lab Results   Component Value Date    CHOL 143 01/31/2020    CHOL 172 04/18/2019    CHOL 170 09/19/2017     Lab Results   Component Value Date    HDL 64 01/31/2020    HDL 52 04/18/2019    HDL 71 09/19/2017     Lab Results   Component Value Date    LDLCALC 72 01/31/2020    LDLCALC 110 (H) 04/18/2019    LDLCALC 92 09/19/2017     Lab Results   Component Value Date    TRIG 34 01/31/2020    TRIG 52 04/18/2019    TRIG 36 09/19/2017     No results found for: CHOLHDL    Lab Results   Component Value Date    TSH 1.61 11/13/2018       Lab Results   Component Value Date    HGBA1C 4.8 08/26/2015       No results found for: HAV, HEPAIGM, HEPBIGM, HEPBCAB, HBEAG, HEPCAB    No results found for: MICROALBUR, [id number]    Assessment/Plan   1. Essential hypertension    2. CKD (chronic kidney disease) stage 3, GFR 30-59 ml/min (CMS/[facility])    3. Gout, unspecified cause, unspecified chronicity, unspecified site    4. Mixed hyperlipidemia      Problem List Items Addressed This Visit        Circulatory    Essential hypertension - Primary (Chronic)    Current Assessment & Plan     Stable blood pressure control with discontinue Toprol and increased hydrochlorothiazide dosing.  Continue current treatment.  Check next  CMP in June.         Relevant Orders    Comprehensive metabolic panel       Genitourinary    CKD (chronic kidney disease) stage 3, GFR 30-59 ml/min (CMS/[facility])    Current Assessment & Plan     Renal function is stable even with hydrochlorothiazide treatment with creatinine 1.7.            Endocrine/Metabolic    Mixed hyperlipidemia (Chronic)    Current Assessment & Plan     New current treatment.         Gout    Current Assessment & Plan     No recent acute gout despite daily dosing with hydrochlorothiazide.  Uric acid is less than 6.               No follow-ups on file.    Orders Placed This Encounter   Procedures   • Comprehensive metabolic panel     Standing Status:   Future     Standing Expiration Date:   3/11/2021         Alex Elliott MD  3/11/2020

## 2020-07-02 ENCOUNTER — APPOINTMENT (OUTPATIENT)
Dept: LAB | Facility: CLINIC | Age: 85
End: 2020-07-02
Attending: INTERNAL MEDICINE
Payer: MEDICARE

## 2020-07-02 DIAGNOSIS — I10 ESSENTIAL HYPERTENSION: Chronic | ICD-10-CM

## 2020-07-02 LAB
ALBUMIN SERPL-MCNC: 4.4 G/DL (ref 3.4–5)
ALP SERPL-CCNC: 66 IU/L (ref 35–126)
ALT SERPL-CCNC: 23 IU/L (ref 16–63)
ANION GAP SERPL CALC-SCNC: 11 MEQ/L (ref 3–15)
AST SERPL-CCNC: 30 IU/L (ref 15–41)
BILIRUB SERPL-MCNC: 1 MG/DL (ref 0.3–1.2)
BUN SERPL-MCNC: 45 MG/DL (ref 8–20)
CALCIUM SERPL-MCNC: 9.5 MG/DL (ref 8.9–10.3)
CHLORIDE SERPL-SCNC: 103 MEQ/L (ref 98–109)
CO2 SERPL-SCNC: 23 MEQ/L (ref 22–32)
CREAT SERPL-MCNC: 2.6 MG/DL (ref 0.8–1.3)
GFR SERPL CREATININE-BSD FRML MDRD: 23.6 ML/MIN/1.73M*2
GLUCOSE SERPL-MCNC: 91 MG/DL (ref 70–99)
POTASSIUM SERPL-SCNC: 4.9 MEQ/L (ref 3.6–5.1)
PROT SERPL-MCNC: 6.7 G/DL (ref 6–8.2)
SODIUM SERPL-SCNC: 137 MEQ/L (ref 136–144)

## 2020-07-02 PROCEDURE — 80053 COMPREHEN METABOLIC PANEL: CPT

## 2020-07-02 PROCEDURE — 36415 COLL VENOUS BLD VENIPUNCTURE: CPT

## 2020-07-08 ENCOUNTER — OFFICE VISIT (OUTPATIENT)
Dept: PRIMARY CARE | Facility: CLINIC | Age: 85
End: 2020-07-08
Payer: MEDICARE

## 2020-07-08 VITALS
DIASTOLIC BLOOD PRESSURE: 66 MMHG | WEIGHT: 143 LBS | TEMPERATURE: 98 F | BODY MASS INDEX: 24.41 KG/M2 | HEART RATE: 74 BPM | HEIGHT: 64 IN | OXYGEN SATURATION: 96 % | SYSTOLIC BLOOD PRESSURE: 108 MMHG

## 2020-07-08 DIAGNOSIS — N18.30 CKD (CHRONIC KIDNEY DISEASE) STAGE 3, GFR 30-59 ML/MIN (CMS/HCC): ICD-10-CM

## 2020-07-08 DIAGNOSIS — I10 ESSENTIAL HYPERTENSION: Primary | Chronic | ICD-10-CM

## 2020-07-08 DIAGNOSIS — E83.42 HYPOMAGNESEMIA: ICD-10-CM

## 2020-07-08 DIAGNOSIS — D50.9 IRON DEFICIENCY ANEMIA, UNSPECIFIED IRON DEFICIENCY ANEMIA TYPE: ICD-10-CM

## 2020-07-08 DIAGNOSIS — M10.9 GOUT, UNSPECIFIED CAUSE, UNSPECIFIED CHRONICITY, UNSPECIFIED SITE: ICD-10-CM

## 2020-07-08 DIAGNOSIS — E78.2 MIXED HYPERLIPIDEMIA: Chronic | ICD-10-CM

## 2020-07-08 PROCEDURE — 99214 OFFICE O/P EST MOD 30 MIN: CPT | Performed by: INTERNAL MEDICINE

## 2020-07-08 ASSESSMENT — ENCOUNTER SYMPTOMS
SHORTNESS OF BREATH: 0
CHILLS: 0
HEMATURIA: 0
COUGH: 0
PALPITATIONS: 0
FEVER: 0
ABDOMINAL PAIN: 0

## 2020-07-08 NOTE — ASSESSMENT & PLAN NOTE
Stable.  Agree with allopurinol dose reduction which he should get away with especially with discontinuation of hydrochlorothiazide.  Uric acid ordered with blood work in 2 weeks as above.

## 2020-07-08 NOTE — ASSESSMENT & PLAN NOTE
Low blood pressure as above.  This is in the setting of hypotension and rising BUN and creatinine suggesting volume contraction/dehydration is the primary cause.  Agree with plan that he had stopped all antihypertensive medications is pushing oral fluid rehydration and recheck of labs in 2 weeks as ordered yesterday by his nephrologist.  Cautioned on home blood pressure monitoring should his blood pressure rise above systolic  or above may need to reinstitute low-dose lisinopril at 5 g daily.  Patient will plan on monitoring his home blood pressure daily and closely.

## 2020-07-08 NOTE — PROGRESS NOTES
Alex Elliott MD  Geriatric Medicine                                                    GERIATRIC MEDICINE    Subjective      Patient ID: Efrain Cardona Jr. is a 86 y.o. male.    Disease Management Visit    The patient is here for an acute problem and for disease Management for evaluation and management of the following chronic medical problems.    This includes: symptom review, medication regimen review, laboratory monitoring, and review of diagnostic testing.    Acute issue 7/8/2020:  Patient has been running relatively low blood pressure and his creatinine on 7/2/2020 drifted up to 2.6.  He saw his nephrologist yesterday who recommended discontinuing all blood pressure medicine and pushing oral fluid rehydration.  He apparently felt he was volume contracted from his hydrochlorothiazide.  Patient has not had dizziness.  Feels well otherwise.    And  Chronic problem review:    Hypertension  --- Update 7/8/2020: Blood pressure is low as above.  Blood pressure medications put on hold.  --- Update 3/11/2020: Blood pressure stable.  His cardiologist dropped his Toprol.  Hydrochlorothiazide was increased to daily dosing.  This simplified his regimen.  He is well controlled at home he is running 110-130/80  --- Update 11/6/2019: Patient is concerned because he has hypertension on morning blood pressure check in the range of 160/90 but by afternoon it is down to 120/80.  At today's visit he is 136/80.  His cardiologist had previously addressed this concern and was not concerned about it.  --- Update 7/10/2019: Stable on current regimen.  Note he is successfully weaned off Norvasc with addition of hydrochlorothiazide and his blood pressure is tolerating hydrochlorothiazide dose reduction to 12.5 g daily  --- Update 3/4/18: Blood pressure stable with medication change with reduction of Norvasc to 5 mg in addition of hydrochlorothiazide 25 mg Monday Wednesday Friday.  --- Update 6/25/18: Stable.  ---update 2/26/18:  Stable; still occasional edema on hot days from amlodipine but he is tolerating it.  ---Update 10/30/17: home BP stable.  --- Update 7/5/17: Home BP tracking stable--however he reports some variability  --- Previous visits: BP is stable on dose reduction of Norvasc and a small dose of Toprol --summertime edema from Norvasc.  --- Previous visit:Patient notes that his home blood pressure monitoring is all from 10-20 points higher than what we check in the office.  He reports running in the systolic range of 125-140 at home.  --- Patient asked about switching from Vasotec to lisinopril do to major cost advantages.  He also notes summertime edema from Norvasc.  Medications       ((( Toprol 12.5 mg daily )))   -by cardiologist 1/31/2020     ((( Norvasc 5 mg daily )))  -on hold 7/7/2020 as above.     ((( Lisinopril 30 mg daily )))     ((( Hydrochlorothiazide 12.5 mg   Daily )))       (((   ASA   )))  --stopped due to study from [place] recommending against ASA for primary prevention    Anemia  -He has recurrent iron deficiency anemia.  --- Update 7/8/2020: Most recent hemoglobin was normal on 1/31/2020 at 12.5.  --- 3/11/2020: This is resolved with most recent hemoglobin on 1/31/2020 of 12.5  --- Update 11/6/2019: Hemoglobin was normal at 12.7 on most recent check which was 9/18/2019.  --- Date 7/10/2019: Hemoglobin on 4/18/2019 was normal at 13.5.  No evidence of recurrent GI bleeding.  --- Update 3/4/19: Hemoglobin was normal on last check 11/13/18 at 14.7  --- Update 6/25/18: Stable.  Recent hemoglobin has been normal most recently 3/20/18 was 14.5.  This is normalized since he stopped aspirin.  ---update 2/26/18: EGD done by GI this summer was negative.He is due for follow-up CBC next month.  Already ordered.  --- Update 7/5/17: Patient has had no signs of GI bleeding.  --- [place] to be due to AVM somewhere in his GI tract causing occult GI bleeding.  ---His recent hemoglobin was 9/19/17 Hgb 14.3  -off ASA  2/15/17  hemoglobin was 13.1  8/18/16 was 14.1  5/25/16 was 12.8  12/30/15 was 14.1  6/30/15 was 13.9  ---Previous visit:  He has since stopped his iron for his capsule endoscopy study which was done approximately 3 weeks ago to evaluate colonic and gastric AVMs as well as the small bowel for source of blood loss.----Patient reports that his capsule endoscopy study was negative. GI recommended stopping aspirin.  ---His gastroenterologist feels this is most likely do to either gastric or colonic AVMs  Last EGD and colonoscopy were done in July 2014.    Hypercholesterolemia  -Goal of treatment is LDL around 100  Primary prevention strategy  Last lipids  --- Update 3/11/2020: Most recent lipid profile on 1/31/2020 was excellent: Total cholesterol 143 HDL 64 LDL 72  4/18/2019: Total cholesterol 172 HDL 52   9/19/17:    HDL 71  LDL 92  2/15/17: Total cholesterol 151 HDL 69 LDL 77  5/25/16: Total cholesterol 168 HDL 72 LDL 81  8/26/15: Total cholesterol 198 HDL 71   11/24/14:    HDL 79  LDL 84  Medications  Zocor 40 mg daily    GERD  --- Update 7/8/2020: Stable  --- Update 11/6/2019: Patient reports that he takes his PPI about 5 days/week.  In light of his osteoporosis and hypomagnesemia we discussed possibly switching to an H2 blocker such as ranitidine.  We discussed recommended trial of ranitidine 150 mg daily at bedtime.  He may check with his GI specialist Dr. Rollins about this recommendation.  As above he has 2 of the complications that PPI cause including accelerated osteoporosis and hypomagnesemia.  Medications  Prilosec 20 mg daily    BPH  Medication  Proscar 5 mg daily    Gout  --- Update 7/8/2020: Yesterday on 7/7/2020 his nephrologist recommended reducing allopurinol to 100 mg daily.  He ordered a uric acid follow-up blood test.  --- Date 3/11/20: Uric acid remains less than 6 on hydrochlorothiazide.  --- Update 11/6/2019: Stable without recent attack  --- Update 7/10/2019: Patient is  tolerating hydrochlorothiazide without any episodes of gout.  --- Update 2/26/18: No recent attacks of gout  Last uric acid 9/19/17 was 4.6  6/30/15 was 4.1  Medications  Allopurinol 100 mg daily                      -reduction 7/7/2020  (Prednisone 20-30 mg when necessary with a 2-3 day taper for acute attacks--- patient initiates treatment himself with this regimen)    Chronic kidney disease  ---s/p Left Nephrectomy 2006  -Transitional Cell Ca  Creatinine ranges 1.4-1.8  --- Update 7/8/2020:  Most recent creatinine on 1/2/2020 creatinine had risen to 2.6.  Seen by nephrology 7/7/2020 and felt to be volume contraction due to dehydration from diuretic.  All antihypertensive meds and diuretic were discontinued yesterday.  Lab testing was ordered by his nephrologist for 2 weeks from now.  9/18/2019 was stable at 1.7  5/16/2019 was stable at 1.8  11/13/18 was 1.3  -this is the best it has been in years.  3/20/18 was 1.5  6/29/17 was 1.6  2/15/17 was 1.6  8/18/16 was 1.6  5/25/16 was 1.7  12/30/15 was 1.4  4/1/15  was 1.6    History of colon polyp    History of intermittent diverticulitis  -Treated with Augmentin or goes to Cipro/Flagyl if persistent symptoms    Low back pain  -Chronic degenerative scoliosis    Hypomagnesemia  --Patient takes an OTC magnesium supplement.  -------------------    Acute problem August 2016 -- diplopia  Progress note:  Over the last 6 months she has had at least 3 episodes of very brief diplopia lasting 1-2 minutes.  He reports if he shuts either eye the sensation is eliminated. With both eyes open it occurs.  This resolves spontaneously within 2 minutes.  Patient denies any other associated neurological symptoms including: He has not had weakness, fatigue, dizziness, speech trouble, etc....  Follow-up: Patient saw his ophthalmologist who examined him and ordered an MRI of the brain which was unremarkable and carotid Doppler study which was normal. He suspected that he had transient  extraocular muscle weakness for unclear reasons.  He has not had a recurrence of his symptoms since July 2016.    Hosp ER Eval  9/22/15:  non-spec ABD Pain w normal CT ABD.    -------------------    Preventive health care and Screening summary:   UPDATE  3/6/17  Colonoscopy  -August 2014  Flu vaccine  2014; 2015 HD  -pharm 12/16/15 for HD; high-dose 12/5/16  Pneumovax  Booster 2011; Prevnar 13 given 2/23/15  dT Booster  Tdap March 2012  Zostavax  --patient did not get this from the pharmacy as ordered and now he wants to await the second generation Zostavax from a different  which may have better efficacy--possibly coming later in 2017  PSA  -2.02  8/26/15--note patient is now over the age of 81 so that screening PSA no longer recommended      The following have been reviewed and updated as appropriate in this visit:         Past Medical History:   Diagnosis Date   • Abnormal EKG    • Arrhythmia    • Chronic kidney disease    • Disease of thyroid gland     1991   • Diverticulitis of colon     Diverticulosis   • GI (gastrointestinal bleed)     X3   last 2015   • Heart murmur    • Hyperlipidemia    • Hypertension    • Infectious viral hepatitis    • Lipid disorder    • Renal failure        Past Surgical History:   Procedure Laterality Date   • HERNIA REPAIR      7/2001   • KIDNEY SURGERY Left     kidney cancer       Family History   Problem Relation Age of Onset   • Lung cancer Biological Mother    • Leukemia Biological Father        Social History     Socioeconomic History   • Marital status:      Spouse name: Not on file   • Number of children: Not on file   • Years of education: Not on file   • Highest education level: Not on file   Occupational History   • Not on file   Social Needs   • Financial resource strain: Not on file   • Food insecurity:     Worry: Not on file     Inability: Not on file   • Transportation needs:     Medical: Not on file     Non-medical: Not on file   Tobacco Use   •  Smoking status: Former Smoker     Last attempt to quit: 1970     Years since quittin.5   • Smokeless tobacco: Never Used   • Tobacco comment: PIPE    Substance and Sexual Activity   • Alcohol use: Yes     Comment: wine, daily   • Drug use: No   • Sexual activity: Defer   Lifestyle   • Physical activity:     Days per week: Not on file     Minutes per session: Not on file   • Stress: Not on file   Relationships   • Social connections:     Talks on phone: Not on file     Gets together: Not on file     Attends Sikh service: Not on file     Active member of club or organization: Not on file     Attends meetings of clubs or organizations: Not on file     Relationship status: Not on file   • Intimate partner violence:     Fear of current or ex partner: Not on file     Emotionally abused: Not on file     Physically abused: Not on file     Forced sexual activity: Not on file   Other Topics Concern   • Not on file   Social History Narrative   • Not on file       Allergies   Allergen Reactions   • Griseofulvin      Other reaction(s): UNKNOWN  fulvicin    • Tetracycline Rash and Other (see comments)     minocin        Current Outpatient Medications   Medication Sig Dispense Refill   • ALLOPURINOL ORAL Take 200 mg by mouth daily.       • finasteride (PROSCAR) 5 mg tablet Take 5 mg by mouth daily.     • simvastatin (ZOCOR) 40 mg tablet Take 1 tablet by mouth daily.  0   • amLODIPine (NORVASC) 5 mg tablet Take 5 mg by mouth daily.     • amoxicillin (AMOXIL) 500 mg capsule take 4 capsules by mouth 1 hour prior to appointment  0   • diphenoxylate-atropine (LOMOTIL) 2.5-0.025 mg per tablet Take 1 tablet by mouth daily as needed for diarrhea. 30 tablet 1   • ferrous sulfate 325 mg (65 mg iron) ER capsule Takes as needed when hemoglobin in low      • hydrochlorothiazide (HYDRODIURIL) 12.5 mg tablet Take 12.5 mg by mouth daily.       • lisinopril (PRINIVIL) 40 mg tablet 40 mg. Take 1.5 tablets by oral route every day     •  MAGNESIUM ORAL Take 500 mg by mouth daily.       • metoprolol succinate XL (TOPROL-XL) 25 mg 24 hr tablet 25 mg. Take HALF tablet by oral route every day     • omeprazole (PriLOSEC) 20 mg capsule Take 20 mg by mouth once a week. 1 capsule daily 30 minutes to 1 hour before a meal       • psyllium husk (METAMUCIL ORAL) Take 2 capsules by mouth daily. Pt is unaware of dosage       No current facility-administered medications for this visit.        Review of Systems   Constitutional: Negative for chills and fever.   Respiratory: Negative for cough and shortness of breath.    Cardiovascular: Negative for chest pain and palpitations.        Palpitations and premature beats have resolved completely it appears.   Gastrointestinal: Negative for abdominal pain.   Genitourinary: Negative for hematuria.       Objective     Vitals:    07/08/20 1056   BP: 108/66   Pulse: 74   Temp: 36.7 °C (98 °F)   SpO2: 96%       Physical Exam   Constitutional: He appears well-developed and well-nourished.   HENT:   Head: Normocephalic.   Cardiovascular: Normal rate, regular rhythm and normal heart sounds.   Pulmonary/Chest: Effort normal and breath sounds normal. No respiratory distress. He has no wheezes. He has no rales.   Abdominal: Soft. Bowel sounds are normal. There is no tenderness.   Musculoskeletal: Normal range of motion. He exhibits no edema.   Neurological: He is alert.       Lab Results   Component Value Date    WBC 5.37 01/31/2020    HGB 12.5 (L) 01/31/2020    HCT 38.3 (L) 01/31/2020    .6 (H) 01/31/2020     01/31/2020         Chemistry        Component Value Date/Time     07/02/2020 1115    K 4.9 07/02/2020 1115     07/02/2020 1115    CO2 23 07/02/2020 1115    BUN 45 (H) 07/02/2020 1115    CREATININE 2.6 (H) 07/02/2020 1115        Component Value Date/Time    CALCIUM 9.5 07/02/2020 1115    ALKPHOS 66 07/02/2020 1115    AST 30 07/02/2020 1115    ALT 23 07/02/2020 1115    BILITOT 1.0 07/02/2020 1115             Lab Results   Component Value Date    CHOL 143 01/31/2020    CHOL 172 04/18/2019    CHOL 170 09/19/2017     Lab Results   Component Value Date    HDL 64 01/31/2020    HDL 52 04/18/2019    HDL 71 09/19/2017     Lab Results   Component Value Date    LDLCALC 72 01/31/2020    LDLCALC 110 (H) 04/18/2019    LDLCALC 92 09/19/2017     Lab Results   Component Value Date    TRIG 34 01/31/2020    TRIG 52 04/18/2019    TRIG 36 09/19/2017     No results found for: CHOLHDL    Lab Results   Component Value Date    TSH 1.61 11/13/2018       Lab Results   Component Value Date    HGBA1C 4.8 08/26/2015       No results found for: HAV, HEPAIGM, HEPBIGM, HEPBCAB, HBEAG, HEPCAB    No results found for: MICROALBUR, [id number]    Assessment/Plan   1. Essential hypertension    2. CKD (chronic kidney disease) stage 3, GFR 30-59 ml/min (CMS/HCC)    3. Gout, unspecified cause, unspecified chronicity, unspecified site    4. Hypomagnesemia    5. Mixed hyperlipidemia    6. Iron deficiency anemia, unspecified iron deficiency anemia type      Problem List Items Addressed This Visit        Circulatory    Essential hypertension - Primary (Chronic)    Current Assessment & Plan     Low blood pressure as above.  This is in the setting of hypotension and rising BUN and creatinine suggesting volume contraction/dehydration is the primary cause.  Agree with plan that he had stopped all antihypertensive medications is pushing oral fluid rehydration and recheck of labs in 2 weeks as ordered yesterday by his nephrologist.  Cautioned on home blood pressure monitoring should his blood pressure rise above systolic  or above may need to reinstitute low-dose lisinopril at 5 g daily.  Patient will plan on monitoring his home blood pressure daily and closely.            Genitourinary    CKD (chronic kidney disease) stage 3, GFR 30-59 ml/min (CMS/HCC)    Current Assessment & Plan     Patient has acute on chronic kidney disease.  Likely volume contraction  from dehydration as above.  Off antihypertensives and diuretics at this time.  Plan as above to recheck labs as ordered by his nephrologist yesterday for 2 weeks from now.  Patient will monitor his blood pressure at home as above.  Note: His nephrologist also ordered a renal ultrasound to rule out obstructive uropathy from BPH.            Endocrine/Metabolic    Mixed hyperlipidemia (Chronic)    Current Assessment & Plan     Lipid numbers were excellent on last check.  Continue current treatment.         Gout    Current Assessment & Plan     Stable.  Agree with allopurinol dose reduction which he should get away with especially with discontinuation of hydrochlorothiazide.  Uric acid ordered with blood work in 2 weeks as above.         Hypomagnesemia    Current Assessment & Plan     This is been stable.  Periodically monitor.            Hematologic    Iron deficiency anemia    Current Assessment & Plan     His anemia appears to have resolved.  Check CBC periodically.               No follow-ups on file.    No orders of the defined types were placed in this encounter.        Alex Elliott MD  7/8/2020

## 2020-07-08 NOTE — ASSESSMENT & PLAN NOTE
Patient has acute on chronic kidney disease.  Likely volume contraction from dehydration as above.  Off antihypertensives and diuretics at this time.  Plan as above to recheck labs as ordered by his nephrologist yesterday for 2 weeks from now.  Patient will monitor his blood pressure at home as above.  Note: His nephrologist also ordered a renal ultrasound to rule out obstructive uropathy from BPH.

## 2020-07-09 ENCOUNTER — TRANSCRIBE ORDERS (OUTPATIENT)
Dept: SCHEDULING | Age: 85
End: 2020-07-09

## 2020-07-09 DIAGNOSIS — N18.30 CHRONIC KIDNEY DISEASE, STAGE 3 (MODERATE): Primary | ICD-10-CM

## 2020-07-17 ENCOUNTER — HOSPITAL ENCOUNTER (OUTPATIENT)
Dept: RADIOLOGY | Facility: CLINIC | Age: 85
Discharge: HOME | End: 2020-07-17
Attending: SPECIALIST
Payer: MEDICARE

## 2020-07-17 ENCOUNTER — TRANSCRIBE ORDERS (OUTPATIENT)
Dept: LAB | Facility: CLINIC | Age: 85
End: 2020-07-17

## 2020-07-17 ENCOUNTER — APPOINTMENT (OUTPATIENT)
Dept: LAB | Facility: CLINIC | Age: 85
End: 2020-07-17
Attending: SPECIALIST
Payer: MEDICARE

## 2020-07-17 DIAGNOSIS — N18.30 CHRONIC KIDNEY DISEASE, STAGE 3 (MODERATE): ICD-10-CM

## 2020-07-17 DIAGNOSIS — N18.30 CHRONIC KIDNEY DISEASE, STAGE 3 (MODERATE): Primary | ICD-10-CM

## 2020-07-17 DIAGNOSIS — E75.5 OTHER LIPID STORAGE DISORDERS: ICD-10-CM

## 2020-07-17 LAB
ALBUMIN SERPL-MCNC: 3.8 G/DL (ref 3.4–5)
ALP SERPL-CCNC: 58 IU/L (ref 35–126)
ALT SERPL-CCNC: 26 IU/L (ref 16–63)
ANION GAP SERPL CALC-SCNC: 10 MEQ/L (ref 3–15)
AST SERPL-CCNC: 30 IU/L (ref 15–41)
BASOPHILS # BLD: 0.03 K/UL (ref 0.01–0.1)
BASOPHILS NFR BLD: 0.5 %
BILIRUB SERPL-MCNC: 1 MG/DL (ref 0.3–1.2)
BILIRUB UR QL STRIP.AUTO: NEGATIVE MG/DL
BUN SERPL-MCNC: 19 MG/DL (ref 8–20)
CALCIUM SERPL-MCNC: 8.9 MG/DL (ref 8.9–10.3)
CHLORIDE SERPL-SCNC: 106 MEQ/L (ref 98–109)
CHOLEST SERPL-MCNC: 131 MG/DL
CLARITY UR REFRACT.AUTO: CLEAR
CO2 SERPL-SCNC: 23 MEQ/L (ref 22–32)
COLOR UR AUTO: YELLOW
CREAT SERPL-MCNC: 1.3 MG/DL (ref 0.8–1.3)
DIFFERENTIAL METHOD BLD: ABNORMAL
EOSINOPHIL # BLD: 0.06 K/UL (ref 0.04–0.54)
EOSINOPHIL NFR BLD: 1.1 %
ERYTHROCYTE [DISTWIDTH] IN BLOOD BY AUTOMATED COUNT: 16 % (ref 11.6–14.4)
GFR SERPL CREATININE-BSD FRML MDRD: 52.5 ML/MIN/1.73M*2
GLUCOSE SERPL-MCNC: 86 MG/DL (ref 70–99)
GLUCOSE UR STRIP.AUTO-MCNC: NEGATIVE MG/DL
HCT VFR BLDCO AUTO: 35.9 % (ref 40.1–51)
HDLC SERPL-MCNC: 55 MG/DL
HDLC SERPL: 2.4 {RATIO}
HGB BLD-MCNC: 12 G/DL (ref 13.7–17.5)
HGB UR QL STRIP.AUTO: NEGATIVE
IMM GRANULOCYTES # BLD AUTO: 0.03 K/UL (ref 0–0.08)
IMM GRANULOCYTES NFR BLD AUTO: 0.5 %
KETONES UR STRIP.AUTO-MCNC: NEGATIVE MG/DL
LDLC SERPL CALC-MCNC: 67 MG/DL
LEUKOCYTE ESTERASE UR QL STRIP.AUTO: NEGATIVE
LYMPHOCYTES # BLD: 2.13 K/UL (ref 1.2–3.5)
LYMPHOCYTES NFR BLD: 37.8 %
MCH RBC QN AUTO: 35.5 PG (ref 28–33.2)
MCHC RBC AUTO-ENTMCNC: 33.4 G/DL (ref 32.2–36.5)
MCV RBC AUTO: 106.2 FL (ref 83–98)
MONOCYTES # BLD: 0.5 K/UL (ref 0.3–1)
MONOCYTES NFR BLD: 8.9 %
NEUTROPHILS # BLD: 2.88 K/UL (ref 1.7–7)
NEUTS SEG NFR BLD: 51.2 %
NITRITE UR QL STRIP.AUTO: NEGATIVE
NONHDLC SERPL-MCNC: 76 MG/DL
NRBC BLD-RTO: 0 %
PDW BLD AUTO: 10.4 FL (ref 9.4–12.4)
PH UR STRIP.AUTO: 6 [PH]
PLATELET # BLD AUTO: 158 K/UL (ref 150–350)
POTASSIUM SERPL-SCNC: 4.3 MEQ/L (ref 3.6–5.1)
PROT SERPL-MCNC: 6.1 G/DL (ref 6–8.2)
PROT UR QL STRIP.AUTO: NEGATIVE
RBC # BLD AUTO: 3.38 M/UL (ref 4.5–5.8)
SODIUM SERPL-SCNC: 139 MEQ/L (ref 136–144)
SP GR UR REFRACT.AUTO: 1.02
TRIGL SERPL-MCNC: 45 MG/DL (ref 30–149)
URATE SERPL-MCNC: 4.8 MG/DL (ref 4.8–8.2)
UROBILINOGEN UR STRIP-ACNC: 0.2 EU/DL
WBC # BLD AUTO: 5.63 K/UL (ref 3.8–10.5)

## 2020-07-17 PROCEDURE — 76775 US EXAM ABDO BACK WALL LIM: CPT

## 2020-07-17 PROCEDURE — 84550 ASSAY OF BLOOD/URIC ACID: CPT

## 2020-07-17 PROCEDURE — 80061 LIPID PANEL: CPT

## 2020-07-17 PROCEDURE — 80053 COMPREHEN METABOLIC PANEL: CPT

## 2020-07-17 PROCEDURE — 81003 URINALYSIS AUTO W/O SCOPE: CPT

## 2020-07-17 PROCEDURE — 85025 COMPLETE CBC W/AUTO DIFF WBC: CPT

## 2020-07-17 PROCEDURE — 36415 COLL VENOUS BLD VENIPUNCTURE: CPT

## 2020-07-24 ENCOUNTER — OFFICE VISIT (OUTPATIENT)
Dept: CARDIOLOGY | Facility: CLINIC | Age: 85
End: 2020-07-24
Payer: MEDICARE

## 2020-07-24 VITALS
WEIGHT: 146 LBS | RESPIRATION RATE: 16 BRPM | OXYGEN SATURATION: 96 % | BODY MASS INDEX: 24.92 KG/M2 | HEART RATE: 71 BPM | SYSTOLIC BLOOD PRESSURE: 124 MMHG | DIASTOLIC BLOOD PRESSURE: 62 MMHG | HEIGHT: 64 IN

## 2020-07-24 DIAGNOSIS — N18.30 CKD (CHRONIC KIDNEY DISEASE) STAGE 3, GFR 30-59 ML/MIN (CMS/HCC): Chronic | ICD-10-CM

## 2020-07-24 DIAGNOSIS — I10 ESSENTIAL HYPERTENSION: Chronic | ICD-10-CM

## 2020-07-24 DIAGNOSIS — I35.1 NONRHEUMATIC AORTIC VALVE INSUFFICIENCY: Primary | Chronic | ICD-10-CM

## 2020-07-24 DIAGNOSIS — I49.49 PREMATURE BEATS: Chronic | ICD-10-CM

## 2020-07-24 PROBLEM — H25.10 NUCLEAR SENILE CATARACT: Status: ACTIVE | Noted: 2020-07-24

## 2020-07-24 PROBLEM — H43.819 VITREOUS DEGENERATION: Status: ACTIVE | Noted: 2020-07-24

## 2020-07-24 PROBLEM — H53.2 DIPLOPIA: Status: ACTIVE | Noted: 2020-07-24

## 2020-07-24 PROCEDURE — 99214 OFFICE O/P EST MOD 30 MIN: CPT | Performed by: INTERNAL MEDICINE

## 2020-07-24 ASSESSMENT — ENCOUNTER SYMPTOMS
SHORTNESS OF BREATH: 0
HOARSE VOICE: 0
DECREASED APPETITE: 0
PSYCHIATRIC NEGATIVE: 1
DOUBLE VISION: 0
HEADACHES: 0
WEIGHT GAIN: 0
BLOATING: 0
IRREGULAR HEARTBEAT: 0
CONSTIPATION: 0
LOSS OF BALANCE: 0
HEARTBURN: 0
DIARRHEA: 0
FREQUENCY: 0
DYSURIA: 0
DIZZINESS: 0

## 2020-07-24 ASSESSMENT — PAIN SCALES - GENERAL: PAINLEVEL: 0-NO PAIN

## 2020-07-24 NOTE — LETTER
July 24, 2020     Alex Elliott MD  [address] [facility] [address]  [facility] [address]    Patient: Efrain Cardona Jr.  YOB: 1934  Date of Visit: 7/24/2020      Dear Dr. Elliott:    Thank you for referring Efrain Cardona Jr. to me for evaluation. Below are my notes for this consultation.    If you have questions, please do not hesitate to call me. I look forward to following your patient along with you.         Sincerely,        Timoteo Kim MD        CC: MD Charles Estes MD Coady, Paul M, MD  7/24/2020 12:01 PM  Signed      Cardiology Consult     Reason for visit: Scheduled cardiology follow-up    Chief Complaint   Patient presents with   • 6 Mo Follow Up   Systemic hypertension  Extrasystoles  Status post nephrectomy  Chronic renal insufficiency     HPI     Efrain Cardona Jr. is a 86 y.o. male  who presents for follow-up. Since last seen here, his overall health is been stable.  He did however experience an interesting series of events earlier this spring.  When the pandemic restrictions had, he decided quarantine at the [facility].  This allowed him to exercise on a regular basis in general he felt well.  He then began to notice that his blood pressure was somewhat low.  This prompted a series of blood test revealing that his creatinine had jumped from approximately 1.7-2.6.  He sought the evaluation of his nephrologist who felt that this was likely secondary to low-dose diuretic therapy which had been initiated about 12 months ago for low-grade pedal edema.  All the patient's medicines were stopped.  Repeat laboratory studies were obtained and his creatinine is now 1.3.  He did recently notice however that his blood pressure is creeping up and he has begun dose of Prinivil in association with Norvasc within the last 48 hours.    No other cardiac symptoms.  Good appetite.  Stable weight.  No fever or chills.  No symptoms of cough or  dyspnea.  Past Medical History:   Diagnosis Date   • Abnormal EKG    • Arrhythmia    • Chronic kidney disease    • Disease of thyroid gland        • Diverticulitis of colon     Diverticulosis   • GI (gastrointestinal bleed)     X3   last    • Heart murmur    • Hyperlipidemia    • Hypertension    • Infectious viral hepatitis    • Lipid disorder    • Renal failure      Past Surgical History:   Procedure Laterality Date   • HERNIA REPAIR      2001   • KIDNEY SURGERY Left     kidney cancer     Griseofulvin and Tetracycline  Current Outpatient Medications   Medication Sig Dispense Refill   • amLODIPine (NORVASC) 5 mg tablet Take 5 mg by mouth daily.     • diphenoxylate-atropine (LOMOTIL) 2.5-0.025 mg per tablet Take 1 tablet by mouth daily as needed for diarrhea. 30 tablet 1   • finasteride (PROSCAR) 5 mg tablet Take 5 mg by mouth daily.     • lisinopril (PRINIVIL) 40 mg tablet 40 mg. Take 1.5 tablets by oral route every day     • omeprazole (PriLOSEC) 20 mg capsule Take 20 mg by mouth as needed. 1 capsule daily 30 minutes to 1 hour before a meal       • psyllium husk (METAMUCIL ORAL) Take 2 capsules by mouth daily. Pt is unaware of dosage     • simvastatin (ZOCOR) 40 mg tablet Take 1 tablet by mouth daily.  0     No current facility-administered medications for this visit.      Social History     Socioeconomic History   • Marital status:      Spouse name: None   • Number of children: None   • Years of education: None   • Highest education level: None   Occupational History   • None   Social Needs   • Financial resource strain: None   • Food insecurity:     Worry: None     Inability: None   • Transportation needs:     Medical: None     Non-medical: None   Tobacco Use   • Smoking status: Former Smoker     Last attempt to quit: 1970     Years since quittin.5   • Smokeless tobacco: Never Used   • Tobacco comment: PIPE    Substance and Sexual Activity   • Alcohol use: Yes     Alcohol/week: 4.0 - 6.0  standard drinks     Types: 4 - 6 Glasses of wine per week     Comment: wine, daily   • Drug use: No   • Sexual activity: Not Currently   Lifestyle   • Physical activity:     Days per week: None     Minutes per session: None   • Stress: None   Relationships   • Social connections:     Talks on phone: None     Gets together: None     Attends [employer] service: None     Active member of club or organization: None     Attends meetings of clubs or organizations: None     Relationship status: None   • Intimate partner violence:     Fear of current or ex partner: None     Emotionally abused: None     Physically abused: None     Forced sexual activity: None   Other Topics Concern   • None   Social History Narrative   • None     Family History   Problem Relation Age of Onset   • Lung cancer Biological Mother    • Leukemia Biological Father         Review of Systems   Constitution: Negative for decreased appetite and weight gain.   HENT: Negative for hearing loss and hoarse voice.    Eyes: Negative for double vision and visual disturbance.   Cardiovascular: Negative for chest pain, irregular heartbeat and leg swelling.   Respiratory: Negative for shortness of breath.    Hematologic/Lymphatic: Negative for bleeding problem.   Skin: Negative for rash.   Musculoskeletal: Positive for joint pain. Negative for arthritis.   Gastrointestinal: Negative for bloating, constipation, diarrhea and heartburn.   Genitourinary: Negative for dysuria and frequency.   Neurological: Negative for dizziness, headaches and loss of balance.   Psychiatric/Behavioral: Negative.    Allergic/Immunologic: Negative for environmental allergies.   All other systems reviewed and are negative.     Objective   Vitals:    07/24/20 1122   BP: 124/62   Pulse: 71   Resp: 16   SpO2: 96%   Weight 146 pounds    Physical Exam   Constitutional: He is oriented to person, place, and time. He appears well-nourished. No distress.   Pleasant fit appearing man not acutely  distressed.   HENT:   Head: Normocephalic and atraumatic.   Nose: Nose normal.   Eyes: Pupils are equal, round, and reactive to light. Conjunctivae are normal. No scleral icterus.   Neck: Normal range of motion. Neck supple. No JVD present. No thyromegaly present.   Cardiovascular: Normal rate, regular rhythm and intact distal pulses. Exam reveals no gallop.   Murmur (Soft systolic murmur along the right sternal border.  No diastolic murmur heard at rest or provoked with maneuver.) heard.  No orthostatic change of the blood pressure.   Pulmonary/Chest: Effort normal and breath sounds normal. He has no wheezes. He has no rales.   Abdominal: Soft. Bowel sounds are normal. There is no tenderness. There is no rebound.   Musculoskeletal: Normal range of motion. He exhibits no edema or deformity.   Mild scoliosis of the lumbar spine.   Neurological: He is alert and oriented to person, place, and time.   Skin: Skin is warm and dry. No rash noted.   Psychiatric: He has a normal mood and affect. His behavior is normal. Judgment and thought content normal.   Nursing note and vitals reviewed.      Labs   Lab Results   Component Value Date    WBC 5.63 07/17/2020    HGB 12.0 (L) 07/17/2020    HCT 35.9 (L) 07/17/2020     07/17/2020    CHOL 131 07/17/2020    TRIG 45 07/17/2020    HDL 55 07/17/2020    ALT 26 07/17/2020    AST 30 07/17/2020     07/17/2020    K 4.3 07/17/2020     07/17/2020    CREATININE 1.3 07/17/2020    BUN 19 07/17/2020    CO2 23 07/17/2020    TSH 1.61 11/13/2018    PSA 2.02 08/26/2015    INR 1.0 09/22/2015    HGBA1C 4.8 08/26/2015       ECG        Assessment and plan:    1.  Occasional extrasystoles on EKG. Resolved.         2.  Hypertension.  With cessation of all antihypertensive therapies, he did notice his blood pressure creeping up.  He is only recently resumed a small dose of calcium channel blocker and a moderate dose of ACE inhibitor.  Blood pressure today is well managed.  We discussed  the ranges of blood pressure that we would potentially be acceptable for maintaining appropriate renal perfusion and also minimizing the effects of hypertension with a cardiovascular system in the future.  He will continue to track his blood pressure and will call with any questions if the pressure requires additional management or adjustment of medications.    3.  Aortic insufficiency.  No change in the murmur on exam.  No symptoms or exam findings of congestive heart failure.    4.  Single kidney status post nephrectomy.  Stable creatinine as of recent laboratory studies at 1.3.  In addition an ultrasound of the kidney was done demonstrating no abnormalities of his remaining single right kidney.    5.  Gastrointestinal bleeding secondary to aspirin.  No clear etiology found despite aggressive GI workup.  He is off aspirin and his hemoglobin is remained stable.    6.  History of thyroiditis.  Recent laboratory studies reveal normal TSH.    I hope to see him in 6 months in follow-up to today's visit.  He is encouraged to call with any questions regarding blood pressure management or other cardiovascular issues.     Timoteo Kim MD  7/24/2020

## 2020-07-24 NOTE — PROGRESS NOTES
Cardiology Consult     Reason for visit: Scheduled cardiology follow-up    Chief Complaint   Patient presents with   • 6 Mo Follow Up   Systemic hypertension  Extrasystoles  Status post nephrectomy  Chronic renal insufficiency     HPI     Efrain Cardona Jr. is a 86 y.o. male  who presents for follow-up. Since last seen here, his overall health is been stable.  He did however experience an interesting series of events earlier this spring.  When the pandemic restrictions had, he decided quarantine at the [facility].  This allowed him to exercise on a regular basis in general he felt well.  He then began to notice that his blood pressure was somewhat low.  This prompted a series of blood test revealing that his creatinine had jumped from approximately 1.7-2.6.  He sought the evaluation of his nephrologist who felt that this was likely secondary to low-dose diuretic therapy which had been initiated about 12 months ago for low-grade pedal edema.  All the patient's medicines were stopped.  Repeat laboratory studies were obtained and his creatinine is now 1.3.  He did recently notice however that his blood pressure is creeping up and he has begun dose of Prinivil in association with Norvasc within the last 48 hours.    No other cardiac symptoms.  Good appetite.  Stable weight.  No fever or chills.  No symptoms of cough or dyspnea.  Past Medical History:   Diagnosis Date   • Abnormal EKG    • Arrhythmia    • Chronic kidney disease    • Disease of thyroid gland     1991   • Diverticulitis of colon     Diverticulosis   • GI (gastrointestinal bleed)     X3   last 2015   • Heart murmur    • Hyperlipidemia    • Hypertension    • Infectious viral hepatitis    • Lipid disorder    • Renal failure      Past Surgical History:   Procedure Laterality Date   • HERNIA REPAIR      7/2001   • KIDNEY SURGERY Left     kidney cancer     Griseofulvin and Tetracycline  Current Outpatient Medications   Medication Sig Dispense Refill    • amLODIPine (NORVASC) 5 mg tablet Take 5 mg by mouth daily.     • diphenoxylate-atropine (LOMOTIL) 2.5-0.025 mg per tablet Take 1 tablet by mouth daily as needed for diarrhea. 30 tablet 1   • finasteride (PROSCAR) 5 mg tablet Take 5 mg by mouth daily.     • lisinopril (PRINIVIL) 40 mg tablet 40 mg. Take 1.5 tablets by oral route every day     • omeprazole (PriLOSEC) 20 mg capsule Take 20 mg by mouth as needed. 1 capsule daily 30 minutes to 1 hour before a meal       • psyllium husk (METAMUCIL ORAL) Take 2 capsules by mouth daily. Pt is unaware of dosage     • simvastatin (ZOCOR) 40 mg tablet Take 1 tablet by mouth daily.  0     No current facility-administered medications for this visit.      Social History     Socioeconomic History   • Marital status:      Spouse name: None   • Number of children: None   • Years of education: None   • Highest education level: None   Occupational History   • None   Social Needs   • Financial resource strain: None   • Food insecurity:     Worry: None     Inability: None   • Transportation needs:     Medical: None     Non-medical: None   Tobacco Use   • Smoking status: Former Smoker     Last attempt to quit: 1970     Years since quittin.5   • Smokeless tobacco: Never Used   • Tobacco comment: PIPE    Substance and Sexual Activity   • Alcohol use: Yes     Alcohol/week: 4.0 - 6.0 standard drinks     Types: 4 - 6 Glasses of wine per week     Comment: wine, daily   • Drug use: No   • Sexual activity: Not Currently   Lifestyle   • Physical activity:     Days per week: None     Minutes per session: None   • Stress: None   Relationships   • Social connections:     Talks on phone: None     Gets together: None     Attends Christianity service: None     Active member of club or organization: None     Attends meetings of clubs or organizations: None     Relationship status: None   • Intimate partner violence:     Fear of current or ex partner: None     Emotionally abused: None      Physically abused: None     Forced sexual activity: None   Other Topics Concern   • None   Social History Narrative   • None     Family History   Problem Relation Age of Onset   • Lung cancer Biological Mother    • Leukemia Biological Father         Review of Systems   Constitution: Negative for decreased appetite and weight gain.   HENT: Negative for hearing loss and hoarse voice.    Eyes: Negative for double vision and visual disturbance.   Cardiovascular: Negative for chest pain, irregular heartbeat and leg swelling.   Respiratory: Negative for shortness of breath.    Hematologic/Lymphatic: Negative for bleeding problem.   Skin: Negative for rash.   Musculoskeletal: Positive for joint pain. Negative for arthritis.   Gastrointestinal: Negative for bloating, constipation, diarrhea and heartburn.   Genitourinary: Negative for dysuria and frequency.   Neurological: Negative for dizziness, headaches and loss of balance.   Psychiatric/Behavioral: Negative.    Allergic/Immunologic: Negative for environmental allergies.   All other systems reviewed and are negative.     Objective   Vitals:    07/24/20 1122   BP: 124/62   Pulse: 71   Resp: 16   SpO2: 96%   Weight 146 pounds    Physical Exam   Constitutional: He is oriented to person, place, and time. He appears well-nourished. No distress.   Pleasant fit appearing man not acutely distressed.   HENT:   Head: Normocephalic and atraumatic.   Nose: Nose normal.   Eyes: Pupils are equal, round, and reactive to light. Conjunctivae are normal. No scleral icterus.   Neck: Normal range of motion. Neck supple. No JVD present. No thyromegaly present.   Cardiovascular: Normal rate, regular rhythm and intact distal pulses. Exam reveals no gallop.   Murmur (Soft systolic murmur along the right sternal border.  No diastolic murmur heard at rest or provoked with maneuver.) heard.  No orthostatic change of the blood pressure.   Pulmonary/Chest: Effort normal and breath sounds normal. He  has no wheezes. He has no rales.   Abdominal: Soft. Bowel sounds are normal. There is no tenderness. There is no rebound.   Musculoskeletal: Normal range of motion. He exhibits no edema or deformity.   Mild scoliosis of the lumbar spine.   Neurological: He is alert and oriented to person, place, and time.   Skin: Skin is warm and dry. No rash noted.   Psychiatric: He has a normal mood and affect. His behavior is normal. Judgment and thought content normal.   Nursing note and vitals reviewed.      Labs   Lab Results   Component Value Date    WBC 5.63 07/17/2020    HGB 12.0 (L) 07/17/2020    HCT 35.9 (L) 07/17/2020     07/17/2020    CHOL 131 07/17/2020    TRIG 45 07/17/2020    HDL 55 07/17/2020    ALT 26 07/17/2020    AST 30 07/17/2020     07/17/2020    K 4.3 07/17/2020     07/17/2020    CREATININE 1.3 07/17/2020    BUN 19 07/17/2020    CO2 23 07/17/2020    TSH 1.61 11/13/2018    PSA 2.02 08/26/2015    INR 1.0 09/22/2015    HGBA1C 4.8 08/26/2015       ECG        Assessment and plan:    1.  Occasional extrasystoles on EKG. Resolved.         2.  Hypertension.  With cessation of all antihypertensive therapies, he did notice his blood pressure creeping up.  He is only recently resumed a small dose of calcium channel blocker and a moderate dose of ACE inhibitor.  Blood pressure today is well managed.  We discussed the ranges of blood pressure that we would potentially be acceptable for maintaining appropriate renal perfusion and also minimizing the effects of hypertension with a cardiovascular system in the future.  He will continue to track his blood pressure and will call with any questions if the pressure requires additional management or adjustment of medications.    3.  Aortic insufficiency.  No change in the murmur on exam.  No symptoms or exam findings of congestive heart failure.    4.  Single kidney status post nephrectomy.  Stable creatinine as of recent laboratory studies at 1.3.  In addition  an ultrasound of the kidney was done demonstrating no abnormalities of his remaining single right kidney.    5.  Gastrointestinal bleeding secondary to aspirin.  No clear etiology found despite aggressive GI workup.  He is off aspirin and his hemoglobin is remained stable.    6.  History of thyroiditis.  Recent laboratory studies reveal normal TSH.    I hope to see him in 6 months in follow-up to today's visit.  He is encouraged to call with any questions regarding blood pressure management or other cardiovascular issues.     Timoteo Kim MD  7/24/2020

## 2020-10-13 NOTE — TELEPHONE ENCOUNTER
Patient has medicare but can get the injection in the office since he had exposure, it's not screening. Asked him to call back to schedule the booster.    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

## 2020-11-09 ENCOUNTER — OFFICE VISIT (OUTPATIENT)
Dept: PRIMARY CARE | Facility: CLINIC | Age: 85
End: 2020-11-09
Payer: MEDICARE

## 2020-11-09 VITALS
HEART RATE: 68 BPM | SYSTOLIC BLOOD PRESSURE: 124 MMHG | HEIGHT: 64 IN | OXYGEN SATURATION: 99 % | DIASTOLIC BLOOD PRESSURE: 70 MMHG | TEMPERATURE: 97.7 F | WEIGHT: 146 LBS | BODY MASS INDEX: 24.92 KG/M2

## 2020-11-09 DIAGNOSIS — N18.31 STAGE 3A CHRONIC KIDNEY DISEASE (CMS/HCC): Chronic | ICD-10-CM

## 2020-11-09 DIAGNOSIS — I10 ESSENTIAL HYPERTENSION: Primary | Chronic | ICD-10-CM

## 2020-11-09 DIAGNOSIS — E78.2 MIXED HYPERLIPIDEMIA: Chronic | ICD-10-CM

## 2020-11-09 DIAGNOSIS — K21.9 GASTROESOPHAGEAL REFLUX DISEASE WITHOUT ESOPHAGITIS: ICD-10-CM

## 2020-11-09 PROCEDURE — 99214 OFFICE O/P EST MOD 30 MIN: CPT | Performed by: INTERNAL MEDICINE

## 2020-11-09 ASSESSMENT — ENCOUNTER SYMPTOMS
FEVER: 0
PALPITATIONS: 0
COUGH: 0
CHILLS: 0
SHORTNESS OF BREATH: 0
HEMATURIA: 0
ABDOMINAL PAIN: 0

## 2020-11-09 NOTE — ASSESSMENT & PLAN NOTE
Blood pressures well controlled.  Now off hydrochlorothiazide.  Continue current treatment and check surveillance BMP in the next month.  This was ordered at today's visit.

## 2020-11-09 NOTE — PROGRESS NOTES
Alex Elliott MD  Geriatric Medicine                                                    GERIATRIC MEDICINE    Subjective      Patient ID: Efrain Cardona Jr. is a 86 y.o. male.    Disease Management Visit    The patient is here for an acute problem and for disease Management for evaluation and management of the following chronic medical problems.    This includes: symptom review, medication regimen review, laboratory monitoring, and review of diagnostic testing.    Hypertension  --- Update 11/9/2020: Blood pressure is now well controlled his hydrochlorothiazide was stopped due to rising creatinine up to 2.6.  Creatinine is now down to 1.3.  --- Update 7/8/2020: Blood pressure is low as above.  Blood pressure medications put on hold.  --- Update 3/11/2020: Blood pressure stable.  His cardiologist dropped his Toprol.  Hydrochlorothiazide was increased to daily dosing.  This simplified his regimen.  He is well controlled at home he is running 110-130/80  --- Update 11/6/2019: Patient is concerned because he has hypertension on morning blood pressure check in the range of 160/90 but by afternoon it is down to 120/80.  At today's visit he is 136/80.  His cardiologist had previously addressed this concern and was not concerned about it.  --- Update 7/10/2019: Stable on current regimen.  Note he is successfully weaned off Norvasc with addition of hydrochlorothiazide and his blood pressure is tolerating hydrochlorothiazide dose reduction to 12.5 g daily  --- Update 3/4/18: Blood pressure stable with medication change with reduction of Norvasc to 5 mg in addition of hydrochlorothiazide 25 mg Monday Wednesday Friday.  --- Update 6/25/18: Stable.  ---update 2/26/18: Stable; still occasional edema on hot days from amlodipine but he is tolerating it.  ---Update 10/30/17: home BP stable.  --- Update 7/5/17: Home BP tracking stable--however he reports some variability  --- Previous visits: BP is stable on dose reduction  of Norvasc and a small dose of Toprol --summertime edema from Norvasc.  --- Previous visit:Patient notes that his home blood pressure monitoring is all from 10-20 points higher than what we check in the office.  He reports running in the systolic range of 125-140 at home.  --- Patient asked about switching from Vasotec to lisinopril do to major cost advantages.  He also notes summertime edema from Norvasc.  Medications       ((( Toprol 12.5 mg daily )))   -by cardiologist 1/31/2020  Norvasc 5 mg daily   Lisinopril 30 mg daily      ((( Hydrochlorothiazide 12.5 mg   Daily )))       (((   ASA   )))  --stopped due to study from [place] recommending against ASA for primary prevention    Anemia  -He has recurrent iron deficiency anemia.  --- Update 11/9/2020: Most recent hemoglobin was excellent at 12.0 on 7/17/2020  --- Update 7/8/2020: Most recent hemoglobin was normal on 1/31/2020 at 12.5.  --- 3/11/2020: This is resolved with most recent hemoglobin on 1/31/2020 of 12.5  --- Update 11/6/2019: Hemoglobin was normal at 12.7 on most recent check which was 9/18/2019.  --- Date 7/10/2019: Hemoglobin on 4/18/2019 was normal at 13.5.  No evidence of recurrent GI bleeding.  --- Update 3/4/19: Hemoglobin was normal on last check 11/13/18 at 14.7  --- Update 6/25/18: Stable.  Recent hemoglobin has been normal most recently 3/20/18 was 14.5.  This is normalized since he stopped aspirin.  ---update 2/26/18: EGD done by GI this summer was negative.He is due for follow-up CBC next month.  Already ordered.  --- Update 7/5/17: Patient has had no signs of GI bleeding.  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  ---His recent hemoglobin was 9/19/17 Hgb 14.3  -off ASA  2/15/17 hemoglobin was 13.1  8/18/16 was 14.1  5/25/16 was 12.8  12/30/15 was 14.1  6/30/15 was 13.9  ---Previous visit:  He has since stopped his iron for his capsule endoscopy study which was done approximately 3 weeks ago to evaluate  colonic and gastric AVMs as well as the small bowel for source of blood loss.----Patient reports that his capsule endoscopy study was negative. GI recommended stopping aspirin.  ---His gastroenterologist feels this is most likely do to either gastric or colonic AVMs  Last EGD and colonoscopy were done in July 2014.    Hypercholesterolemia  -Goal of treatment is LDL around 100  Primary prevention strategy  Last lipids  --- Update 3/11/2020: Most recent lipid profile on 7/17/2020: Total cholesterol 131 HDL 55 LDL 67  1/31/2020 was excellent: Total cholesterol 143 HDL 64 LDL 72  4/18/2019: Total cholesterol 172 HDL 52   9/19/17:    HDL 71  LDL 92  2/15/17: Total cholesterol 151 HDL 69 LDL 77  5/25/16: Total cholesterol 168 HDL 72 LDL 81  8/26/15: Total cholesterol 198 HDL 71   11/24/14:    HDL 79  LDL 84  Medications  Zocor 40 mg daily    GERD  --- Update 11/9/2020: Stable now on as needed regimen.  --- Update 7/8/2020: Stable  --- Update 11/6/2019: Patient reports that he takes his PPI about 5 days/week.  In light of his osteoporosis and hypomagnesemia we discussed possibly switching to an H2 blocker such as ranitidine.  We discussed recommended trial of ranitidine 150 mg daily at bedtime.  He may check with his GI specialist Dr. Rollins about this recommendation.  As above he has 2 of the complications that PPI cause including accelerated osteoporosis and hypomagnesemia.  Medications  Prilosec 20 mg daily as needed    BPH  Medication  Proscar 5 mg daily    Gout  --- Update 11/9/2020: Allopurinol was stopped as his hydrochlorothiazide which was the likely trigger of his gout attacks was also stopped.  --- Update 7/8/2020: Yesterday on 7/7/2020 his nephrologist recommended reducing allopurinol to 100 mg daily.  He ordered a uric acid follow-up blood test.  --- Date 3/11/20: Uric acid remains less than 6 on hydrochlorothiazide.  --- Update 11/6/2019: Stable without recent attack  --- Update  7/10/2019: Patient is tolerating hydrochlorothiazide without any episodes of gout.  --- Update 2/26/18: No recent attacks of gout  Last uric acid 9/19/17 was 4.6  6/30/15 was 4.1  Medications       ((( Allopurinol 100 mg daily                      -reduction 7/7/2020  )))  (Prednisone 20-30 mg when necessary with a 2-3 day taper for acute attacks--- patient initiates treatment himself with this regimen)    Chronic kidney disease  ---s/p Left Nephrectomy 2006  -Transitional Cell Ca  Creatinine ranges 1.4-1.8  --- Update 11/9/2020: Most recent creatinine off hydrochlorothiazide is excellent at 1.3 on 7/17/2020.  --- Update 7/8/2020:  Most recent creatinine on 1/2/2020 creatinine had risen to 2.6.  Seen by nephrology 7/7/2020 and felt to be volume contraction due to dehydration from diuretic.  All antihypertensive meds and diuretic were discontinued yesterday.  Lab testing was ordered by his nephrologist for 2 weeks from now.  9/18/2019 was stable at 1.7  5/16/2019 was stable at 1.8  11/13/18 was 1.3  -this is the best it has been in years.  3/20/18 was 1.5  6/29/17 was 1.6  2/15/17 was 1.6  8/18/16 was 1.6  5/25/16 was 1.7  12/30/15 was 1.4  4/1/15  was 1.6    History of colon polyp    History of intermittent diverticulitis  -Treated with Augmentin or goes to Cipro/Flagyl if persistent symptoms    Low back pain  -Chronic degenerative scoliosis    Hypomagnesemia  --Patient takes an OTC magnesium supplement.  -------------------    Acute problem August 2016 -- diplopia  Progress note:  Over the last 6 months she has had at least 3 episodes of very brief diplopia lasting 1-2 minutes.  He reports if he shuts either eye the sensation is eliminated. With both eyes open it occurs.  This resolves spontaneously within 2 minutes.  Patient denies any other associated neurological symptoms including: He has not had weakness, fatigue, dizziness, speech trouble, etc....  Follow-up: Patient saw his ophthalmologist who examined him  and ordered an MRI of the brain which was unremarkable and carotid Doppler study which was normal. He suspected that he had transient extraocular muscle weakness for unclear reasons.  He has not had a recurrence of his symptoms since July 2016.    Hosp ER Eval  9/22/15:  non-spec ABD Pain w normal CT ABD.    -------------------    Preventive health care and Screening summary:   UPDATE  3/6/17  Colonoscopy  -August 2014  Flu vaccine  2014; 2015 HD  -pharm 12/16/15 for HD; high-dose 12/5/16  Pneumovax  Booster 2011; Prevnar 13 given 2/23/15  dT Booster  Tdap March 2012  Zostavax  --patient did not get this from the pharmacy as ordered and now he wants to await the second generation Zostavax from a different  which may have better efficacy--possibly coming later in 2017  PSA  -2.02  8/26/15--note patient is now over the age of 81 so that screening PSA no longer recommended      The following have been reviewed and updated as appropriate in this visit:         Past Medical History:   Diagnosis Date   • Abnormal EKG    • Arrhythmia    • Chronic kidney disease    • Disease of thyroid gland     1991   • Diverticulitis of colon     Diverticulosis   • GI (gastrointestinal bleed)     X3   last 2015   • Heart murmur    • Hyperlipidemia    • Hypertension    • Infectious viral hepatitis    • Lipid disorder    • Renal failure        Past Surgical History:   Procedure Laterality Date   • HERNIA REPAIR      7/2001   • KIDNEY SURGERY Left     kidney cancer       Family History   Problem Relation Age of Onset   • Lung cancer Biological Mother    • Leukemia Biological Father        Social History     Socioeconomic History   • Marital status:      Spouse name: Not on file   • Number of children: Not on file   • Years of education: Not on file   • Highest education level: Not on file   Occupational History   • Not on file   Social Needs   • Financial resource strain: Not on file   • Food insecurity     Worry: Not on  file     Inability: Not on file   • Transportation needs     Medical: Not on file     Non-medical: Not on file   Tobacco Use   • Smoking status: Former Smoker     Quit date: 1970     Years since quittin.8   • Smokeless tobacco: Never Used   • Tobacco comment: PIPE    Substance and Sexual Activity   • Alcohol use: Yes     Alcohol/week: 4.0 - 6.0 standard drinks     Types: 4 - 6 Glasses of wine per week     Comment: wine, daily   • Drug use: No   • Sexual activity: Not Currently   Lifestyle   • Physical activity     Days per week: Not on file     Minutes per session: Not on file   • Stress: Not on file   Relationships   • Social connections     Talks on phone: Not on file     Gets together: Not on file     Attends [employer] service: Not on file     Active member of club or organization: Not on file     Attends meetings of clubs or organizations: Not on file     Relationship status: Not on file   • Intimate partner violence     Fear of current or ex partner: Not on file     Emotionally abused: Not on file     Physically abused: Not on file     Forced sexual activity: Not on file   Other Topics Concern   • Not on file   Social History Narrative   • Not on file       Allergies   Allergen Reactions   • Griseofulvin      Other reaction(s): UNKNOWN  fulvicin    • Tetracycline Rash and Other (see comments)     minocin        Current Outpatient Medications   Medication Sig Dispense Refill   • amLODIPine (NORVASC) 5 mg tablet Take 5 mg by mouth daily.     • finasteride (PROSCAR) 5 mg tablet Take 5 mg by mouth daily.     • lisinopril (PRINIVIL) 40 mg tablet 40 mg. Take 1.5 tablets by oral route every day     • omeprazole (PriLOSEC) 20 mg capsule Take 20 mg by mouth as needed. 1 capsule daily 30 minutes to 1 hour before a meal       • simvastatin (ZOCOR) 40 mg tablet Take 1 tablet by mouth daily.  0   • diphenoxylate-atropine (LOMOTIL) 2.5-0.025 mg per tablet Take 1 tablet by mouth daily as needed for diarrhea. (Patient not  taking: Reported on 11/9/2020 ) 30 tablet 1   • psyllium husk (METAMUCIL ORAL) Take 2 capsules by mouth daily. Pt is unaware of dosage       No current facility-administered medications for this visit.        Review of Systems   Constitutional: Negative for chills and fever.   Respiratory: Negative for cough and shortness of breath.    Cardiovascular: Negative for chest pain and palpitations.        Palpitations and premature beats have resolved completely it appears.   Gastrointestinal: Negative for abdominal pain.   Genitourinary: Negative for hematuria.       Objective     Vitals:    11/09/20 1138   BP: 124/70   Pulse: 68   Temp: 36.5 °C (97.7 °F)   SpO2: 99%       Physical Exam  Constitutional:       Appearance: He is well-developed.   HENT:      Head: Normocephalic.   Cardiovascular:      Rate and Rhythm: Normal rate and regular rhythm.      Heart sounds: Normal heart sounds.   Pulmonary:      Effort: Pulmonary effort is normal. No respiratory distress.      Breath sounds: Normal breath sounds. No wheezing or rales.   Abdominal:      General: Bowel sounds are normal.      Palpations: Abdomen is soft.      Tenderness: There is no abdominal tenderness.   Musculoskeletal: Normal range of motion.      Right lower leg: No edema.      Left lower leg: No edema.   Skin:     General: Skin is warm and dry.   Neurological:      Mental Status: He is alert.         Lab Results   Component Value Date    WBC 5.63 07/17/2020    HGB 12.0 (L) 07/17/2020    HCT 35.9 (L) 07/17/2020    .2 (H) 07/17/2020     07/17/2020         Chemistry        Component Value Date/Time     07/17/2020 1115    K 4.3 07/17/2020 1115     07/17/2020 1115    CO2 23 07/17/2020 1115    BUN 19 07/17/2020 1115    CREATININE 1.3 07/17/2020 1115        Component Value Date/Time    CALCIUM 8.9 07/17/2020 1115    ALKPHOS 58 07/17/2020 1115    AST 30 07/17/2020 1115    ALT 26 07/17/2020 1115    BILITOT 1.0 07/17/2020 1115            Lab  Results   Component Value Date    CHOL 131 07/17/2020    CHOL 143 01/31/2020    CHOL 172 04/18/2019     Lab Results   Component Value Date    HDL 55 07/17/2020    HDL 64 01/31/2020    HDL 52 04/18/2019     Lab Results   Component Value Date    LDLCALC 67 07/17/2020    LDLCALC 72 01/31/2020    LDLCALC 110 (H) 04/18/2019     Lab Results   Component Value Date    TRIG 45 07/17/2020    TRIG 34 01/31/2020    TRIG 52 04/18/2019     No results found for: CHOLHDL    Lab Results   Component Value Date    TSH 1.61 11/13/2018       Lab Results   Component Value Date    HGBA1C 4.8 08/26/2015       No results found for: HAV, HEPAIGM, HEPBIGM, HEPBCAB, HBEAG, HEPCAB    No results found for: MICROALBUR, [id number]    Assessment/Plan   1. Essential hypertension    2. Gastroesophageal reflux disease without esophagitis    3. Stage 3a chronic kidney disease    4. Mixed hyperlipidemia      Problem List Items Addressed This Visit        Circulatory    Essential hypertension - Primary (Chronic)    Current Assessment & Plan     Blood pressures well controlled.  Now off hydrochlorothiazide.  Continue current treatment and check surveillance BMP in the next month.  This was ordered at today's visit.         Relevant Orders    Basic metabolic panel       Digestive    Esophageal reflux    Overview     Overview:          Current Assessment & Plan     Stable on as needed PPI.  Continue current treatment.            Genitourinary    CKD (chronic kidney disease) stage 3, GFR 30-59 ml/min (CMS/[facility]) (Chronic)    Current Assessment & Plan     Creatinine is much improved down to 1.3 off hydrochlorothiazide.  Continue current treatment.            Endocrine/Metabolic    Mixed hyperlipidemia (Chronic)    Current Assessment & Plan     Lipid numbers are excellent.  Continue current treatment.               Return in about 4 months (around 3/9/2021).    Orders Placed This Encounter   Procedures   • Basic metabolic panel     Standing Status:   Future      Standing Expiration Date:   11/9/2021     Order Specific Question:   Release to patient     Answer:   Immediate         Alex Elliott MD  11/9/2020

## 2020-11-28 ENCOUNTER — ANESTHESIA EVENT (INPATIENT)
Dept: OPERATING ROOM | Facility: HOSPITAL | Age: 85
DRG: 481 | End: 2020-11-28
Payer: MEDICARE

## 2020-11-28 ENCOUNTER — APPOINTMENT (INPATIENT)
Dept: RADIOLOGY | Facility: HOSPITAL | Age: 85
DRG: 481 | End: 2020-11-28
Attending: STUDENT IN AN ORGANIZED HEALTH CARE EDUCATION/TRAINING PROGRAM
Payer: MEDICARE

## 2020-11-28 ENCOUNTER — APPOINTMENT (EMERGENCY)
Dept: RADIOLOGY | Facility: HOSPITAL | Age: 85
DRG: 481 | End: 2020-11-28
Attending: EMERGENCY MEDICINE
Payer: MEDICARE

## 2020-11-28 ENCOUNTER — HOSPITAL ENCOUNTER (INPATIENT)
Facility: HOSPITAL | Age: 85
LOS: 3 days | Discharge: HOME HEALTH | DRG: 481 | End: 2020-12-01
Attending: EMERGENCY MEDICINE | Admitting: HOSPITALIST
Payer: MEDICARE
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
## 2020-11-28 DIAGNOSIS — D50.9 IRON DEFICIENCY ANEMIA, UNSPECIFIED IRON DEFICIENCY ANEMIA TYPE: ICD-10-CM

## 2020-11-28 DIAGNOSIS — S72.002A CLOSED FRACTURE OF LEFT HIP, INITIAL ENCOUNTER (CMS/HCC): ICD-10-CM

## 2020-11-28 DIAGNOSIS — N17.9 AKI (ACUTE KIDNEY INJURY) (CMS/HCC): ICD-10-CM

## 2020-11-28 PROBLEM — M25.552 LEFT HIP PAIN: Status: ACTIVE | Noted: 2020-11-28

## 2020-11-28 PROBLEM — Z51.5 PALLIATIVE CARE BY SPECIALIST: Status: ACTIVE | Noted: 2020-11-28

## 2020-11-28 PROBLEM — R53.81 DEBILITY: Status: ACTIVE | Noted: 2020-11-28

## 2020-11-28 PROBLEM — S22.39XA RIB FRACTURE: Status: ACTIVE | Noted: 2020-11-28

## 2020-11-28 PROBLEM — S72.009A HIP FRACTURE (CMS/HCC): Status: ACTIVE | Noted: 2020-11-28

## 2020-11-28 PROBLEM — N18.9 ACUTE KIDNEY INJURY SUPERIMPOSED ON CKD (CMS/HCC)  (CMS/HCC): Status: ACTIVE | Noted: 2020-11-28

## 2020-11-28 LAB
ABO + RH BLD: NORMAL
ANION GAP SERPL CALC-SCNC: 7 MEQ/L (ref 3–15)
APTT PPP: 27 SEC (ref 23–35)
ATRIAL RATE: 71
BASOPHILS # BLD: 0.05 K/UL (ref 0.01–0.1)
BASOPHILS NFR BLD: 0.8 %
BILIRUB UR QL STRIP.AUTO: NEGATIVE MG/DL
BLD GP AB SCN SERPL QL: NEGATIVE
BUN SERPL-MCNC: 42 MG/DL (ref 8–20)
CALCIUM SERPL-MCNC: 9.2 MG/DL (ref 8.9–10.3)
CHLORIDE SERPL-SCNC: 106 MEQ/L (ref 98–109)
CLARITY UR REFRACT.AUTO: CLEAR
CO2 SERPL-SCNC: 23 MEQ/L (ref 22–32)
COLOR UR AUTO: YELLOW
CREAT SERPL-MCNC: 2 MG/DL (ref 0.8–1.3)
D AG BLD QL: POSITIVE
DIFFERENTIAL METHOD BLD: ABNORMAL
EOSINOPHIL # BLD: 0.03 K/UL (ref 0.04–0.54)
EOSINOPHIL NFR BLD: 0.5 %
ERYTHROCYTE [DISTWIDTH] IN BLOOD BY AUTOMATED COUNT: 15.1 % (ref 11.6–14.4)
GFR SERPL CREATININE-BSD FRML MDRD: 31.9 ML/MIN/1.73M*2
GLUCOSE SERPL-MCNC: 121 MG/DL (ref 70–99)
GLUCOSE UR STRIP.AUTO-MCNC: NEGATIVE MG/DL
HCT VFR BLDCO AUTO: 37.5 % (ref 40.1–51)
HGB BLD-MCNC: 12.8 G/DL (ref 13.7–17.5)
HGB UR QL STRIP.AUTO: NEGATIVE
IMM GRANULOCYTES # BLD AUTO: 0.02 K/UL (ref 0–0.08)
IMM GRANULOCYTES NFR BLD AUTO: 0.3 %
INR PPP: 1.1 INR
KETONES UR STRIP.AUTO-MCNC: NEGATIVE MG/DL
LABORATORY COMMENT REPORT: NORMAL
LEUKOCYTE ESTERASE UR QL STRIP.AUTO: NEGATIVE
LYMPHOCYTES # BLD: 1.44 K/UL (ref 1.2–3.5)
LYMPHOCYTES NFR BLD: 22.1 %
MCH RBC QN AUTO: 34 PG (ref 28–33.2)
MCHC RBC AUTO-ENTMCNC: 34.1 G/DL (ref 32.2–36.5)
MCV RBC AUTO: 99.7 FL (ref 83–98)
MONOCYTES # BLD: 0.68 K/UL (ref 0.3–1)
MONOCYTES NFR BLD: 10.4 %
NEUTROPHILS # BLD: 4.31 K/UL (ref 1.7–7)
NEUTS SEG NFR BLD: 65.9 %
NITRITE UR QL STRIP.AUTO: NEGATIVE
NRBC BLD-RTO: 0 %
P AXIS: 14
PDW BLD AUTO: 9.9 FL (ref 9.4–12.4)
PH UR STRIP.AUTO: 5.5 [PH]
PLATELET # BLD AUTO: 136 K/UL (ref 150–350)
POTASSIUM SERPL-SCNC: 4.9 MEQ/L (ref 3.6–5.1)
PR INTERVAL: 184
PROT UR QL STRIP.AUTO: NEGATIVE
PROTHROMBIN TIME: 14.2 SEC (ref 12.2–14.5)
QRS DURATION: 74
QT INTERVAL: 384
QTC CALCULATION(BAZETT): 417
R AXIS: 23
RBC # BLD AUTO: 3.76 M/UL (ref 4.5–5.8)
SARS-COV-2 RNA RESP QL NAA+PROBE: NEGATIVE
SODIUM SERPL-SCNC: 136 MEQ/L (ref 136–144)
SP GR UR REFRACT.AUTO: 1.02
T WAVE AXIS: 19
UROBILINOGEN UR STRIP-ACNC: 0.2 EU/DL
VENTRICULAR RATE: 71
WBC # BLD AUTO: 6.53 K/UL (ref 3.8–10.5)

## 2020-11-28 PROCEDURE — 86850 RBC ANTIBODY SCREEN: CPT

## 2020-11-28 PROCEDURE — 96374 THER/PROPH/DIAG INJ IV PUSH: CPT

## 2020-11-28 PROCEDURE — 99223 1ST HOSP IP/OBS HIGH 75: CPT | Performed by: NURSE PRACTITIONER

## 2020-11-28 PROCEDURE — 85025 COMPLETE CBC W/AUTO DIFF WBC: CPT | Performed by: EMERGENCY MEDICINE

## 2020-11-28 PROCEDURE — 12000000 HC ROOM AND CARE MED/SURG

## 2020-11-28 PROCEDURE — 63700000 HC SELF-ADMINISTRABLE DRUG: Performed by: HOSPITALIST

## 2020-11-28 PROCEDURE — 25800000 HC PHARMACY IV SOLUTIONS: Performed by: EMERGENCY MEDICINE

## 2020-11-28 PROCEDURE — 25800000 HC PHARMACY IV SOLUTIONS: Performed by: HOSPITALIST

## 2020-11-28 PROCEDURE — 85730 THROMBOPLASTIN TIME PARTIAL: CPT | Performed by: EMERGENCY MEDICINE

## 2020-11-28 PROCEDURE — 85610 PROTHROMBIN TIME: CPT | Performed by: EMERGENCY MEDICINE

## 2020-11-28 PROCEDURE — 63600000 HC DRUGS/DETAIL CODE: Performed by: EMERGENCY MEDICINE

## 2020-11-28 PROCEDURE — 73552 X-RAY EXAM OF FEMUR 2/>: CPT | Mod: LT

## 2020-11-28 PROCEDURE — 72170 X-RAY EXAM OF PELVIS: CPT

## 2020-11-28 PROCEDURE — 36415 COLL VENOUS BLD VENIPUNCTURE: CPT | Performed by: EMERGENCY MEDICINE

## 2020-11-28 PROCEDURE — 63600000 HC DRUGS/DETAIL CODE: Performed by: HOSPITALIST

## 2020-11-28 PROCEDURE — 80048 BASIC METABOLIC PNL TOTAL CA: CPT | Performed by: EMERGENCY MEDICINE

## 2020-11-28 PROCEDURE — 73070 X-RAY EXAM OF ELBOW: CPT | Mod: LT

## 2020-11-28 PROCEDURE — 71101 X-RAY EXAM UNILAT RIBS/CHEST: CPT | Mod: RT

## 2020-11-28 PROCEDURE — 93005 ELECTROCARDIOGRAM TRACING: CPT | Performed by: EMERGENCY MEDICINE

## 2020-11-28 PROCEDURE — 81003 URINALYSIS AUTO W/O SCOPE: CPT | Performed by: EMERGENCY MEDICINE

## 2020-11-28 PROCEDURE — 63700000 HC SELF-ADMINISTRABLE DRUG: Performed by: EMERGENCY MEDICINE

## 2020-11-28 PROCEDURE — 99285 EMERGENCY DEPT VISIT HI MDM: CPT | Mod: 25

## 2020-11-28 PROCEDURE — U0003 INFECTIOUS AGENT DETECTION BY NUCLEIC ACID (DNA OR RNA); SEVERE ACUTE RESPIRATORY SYNDROME CORONAVIRUS 2 (SARS-COV-2) (CORONAVIRUS DISEASE [COVID-19]), AMPLIFIED PROBE TECHNIQUE, MAKING USE OF HIGH THROUGHPUT TECHNOLOGIES AS DESCRIBED BY CMS-2020-01-R: HCPCS | Performed by: EMERGENCY MEDICINE

## 2020-11-28 PROCEDURE — 99223 1ST HOSP IP/OBS HIGH 75: CPT | Performed by: HOSPITALIST

## 2020-11-28 RX ORDER — MORPHINE SULFATE 2 MG/ML
2 INJECTION, SOLUTION INTRAMUSCULAR; INTRAVENOUS ONCE
Status: COMPLETED | OUTPATIENT
Start: 2020-11-28 | End: 2020-11-28

## 2020-11-28 RX ORDER — HEPARIN SODIUM 5000 [USP'U]/ML
5000 INJECTION, SOLUTION INTRAVENOUS; SUBCUTANEOUS EVERY 8 HOURS
Status: COMPLETED | OUTPATIENT
Start: 2020-11-28 | End: 2020-11-28

## 2020-11-28 RX ORDER — FINASTERIDE 5 MG/1
5 TABLET, FILM COATED ORAL DAILY
Status: DISCONTINUED | OUTPATIENT
Start: 2020-11-29 | End: 2020-12-01 | Stop reason: HOSPADM

## 2020-11-28 RX ORDER — DEXTROSE 50 % IN WATER (D50W) INTRAVENOUS SYRINGE
25 AS NEEDED
Status: DISCONTINUED | OUTPATIENT
Start: 2020-11-28 | End: 2020-12-01 | Stop reason: HOSPADM

## 2020-11-28 RX ORDER — OXYCODONE HYDROCHLORIDE 5 MG/1
5 TABLET ORAL EVERY 4 HOURS PRN
Status: DISCONTINUED | OUTPATIENT
Start: 2020-11-28 | End: 2020-12-01 | Stop reason: HOSPADM

## 2020-11-28 RX ORDER — SODIUM CHLORIDE 9 MG/ML
125 INJECTION, SOLUTION INTRAVENOUS CONTINUOUS
Status: DISCONTINUED | OUTPATIENT
Start: 2020-11-28 | End: 2020-11-28

## 2020-11-28 RX ORDER — IBUPROFEN 200 MG
16-32 TABLET ORAL AS NEEDED
Status: DISCONTINUED | OUTPATIENT
Start: 2020-11-28 | End: 2020-12-01 | Stop reason: HOSPADM

## 2020-11-28 RX ORDER — DEXTROSE 40 %
15-30 GEL (GRAM) ORAL AS NEEDED
Status: DISCONTINUED | OUTPATIENT
Start: 2020-11-28 | End: 2020-12-01 | Stop reason: HOSPADM

## 2020-11-28 RX ORDER — ACETAMINOPHEN 325 MG/1
650 TABLET ORAL EVERY 6 HOURS
Status: DISCONTINUED | OUTPATIENT
Start: 2020-11-28 | End: 2020-12-01 | Stop reason: HOSPADM

## 2020-11-28 RX ORDER — MORPHINE SULFATE 2 MG/ML
INJECTION, SOLUTION INTRAMUSCULAR; INTRAVENOUS
Status: DISPENSED
Start: 2020-11-28 | End: 2020-11-28

## 2020-11-28 RX ORDER — CEFAZOLIN SODIUM 2 G/50ML
2 SOLUTION INTRAVENOUS
Status: DISCONTINUED | OUTPATIENT
Start: 2020-11-28 | End: 2020-12-01 | Stop reason: HOSPADM

## 2020-11-28 RX ORDER — AMLODIPINE BESYLATE 5 MG/1
5 TABLET ORAL DAILY
Status: DISCONTINUED | OUTPATIENT
Start: 2020-11-29 | End: 2020-12-01 | Stop reason: HOSPADM

## 2020-11-28 RX ORDER — SODIUM CHLORIDE 9 MG/ML
INJECTION, SOLUTION INTRAVENOUS CONTINUOUS
Status: DISCONTINUED | OUTPATIENT
Start: 2020-11-28 | End: 2020-11-29

## 2020-11-28 RX ORDER — MORPHINE SULFATE 2 MG/ML
2 INJECTION, SOLUTION INTRAMUSCULAR; INTRAVENOUS EVERY 4 HOURS PRN
Status: DISCONTINUED | OUTPATIENT
Start: 2020-11-28 | End: 2020-11-28

## 2020-11-28 RX ORDER — ATORVASTATIN CALCIUM 20 MG/1
20 TABLET, FILM COATED ORAL DAILY
Status: DISCONTINUED | OUTPATIENT
Start: 2020-11-29 | End: 2020-12-01 | Stop reason: HOSPADM

## 2020-11-28 RX ORDER — ACETAMINOPHEN 325 MG/1
650 TABLET ORAL ONCE
Status: COMPLETED | OUTPATIENT
Start: 2020-11-28 | End: 2020-11-28

## 2020-11-28 RX ADMIN — SODIUM CHLORIDE: 9 INJECTION, SOLUTION INTRAVENOUS at 09:41

## 2020-11-28 RX ADMIN — OXYCODONE HYDROCHLORIDE 5 MG: 5 TABLET ORAL at 18:31

## 2020-11-28 RX ADMIN — MORPHINE SULFATE 2 MG: 2 INJECTION, SOLUTION INTRAMUSCULAR; INTRAVENOUS at 08:38

## 2020-11-28 RX ADMIN — SODIUM CHLORIDE 500 ML: 9 INJECTION, SOLUTION INTRAVENOUS at 08:28

## 2020-11-28 RX ADMIN — ACETAMINOPHEN 650 MG: 325 TABLET, FILM COATED ORAL at 17:00

## 2020-11-28 RX ADMIN — OXYCODONE HYDROCHLORIDE 5 MG: 5 TABLET ORAL at 23:58

## 2020-11-28 RX ADMIN — HEPARIN SODIUM 5000 UNITS: 5000 INJECTION, SOLUTION INTRAVENOUS; SUBCUTANEOUS at 21:51

## 2020-11-28 RX ADMIN — ACETAMINOPHEN 650 MG: 325 TABLET ORAL at 07:23

## 2020-11-28 RX ADMIN — ACETAMINOPHEN 650 MG: 325 TABLET, FILM COATED ORAL at 23:58

## 2020-11-28 RX ADMIN — HEPARIN SODIUM 5000 UNITS: 5000 INJECTION, SOLUTION INTRAVENOUS; SUBCUTANEOUS at 14:19

## 2020-11-28 SDOH — HEALTH STABILITY: MENTAL HEALTH: HOW OFTEN DO YOU HAVE A DRINK CONTAINING ALCOHOL?: 4 OR MORE TIMES A WEEK

## 2020-11-28 SDOH — HEALTH STABILITY: MENTAL HEALTH: HOW MANY DRINKS CONTAINING ALCOHOL DO YOU HAVE ON A TYPICAL DAY WHEN YOU ARE DRINKING?: 1 OR 2

## 2020-11-28 ASSESSMENT — ENCOUNTER SYMPTOMS
BACK PAIN: 0
FACIAL ASYMMETRY: 0
NECK PAIN: 0
WEAKNESS: 0
VOMITING: 0
SPEECH DIFFICULTY: 0
LIGHT-HEADEDNESS: 0
HEADACHES: 0
DIZZINESS: 0
DIFFICULTY BREATHING: 0
LOSS OF CONSCIOUSNESS: 0
SHORTNESS OF BREATH: 0
NAUSEA: 0
COUGH: 0
NUMBNESS: 0
ABDOMINAL PAIN: 0

## 2020-11-28 NOTE — PLAN OF CARE
Problem: Adult Inpatient Plan of Care  Goal: Readiness for Transition of Care  Outcome: Progressing  Intervention: Mutually Develop Transition Plan  Flowsheets (Taken 11/28/2020 1241)  Concerns to be Addressed:   discharge planning   care coordination/care conferences  Readmission Within the Last 30 Days: no previous admission in last 30 days  Assistive Device/Animal Currently Used at Home: none  Anticipated Discharge Disposition:   home with assistance   home with home health services   inpatient rehabilitation facility/acute rehab  Equipment Needed After Discharge: walker, standard  Anticipated Changes Related to Illness: inability to care for self  Patient/Family Anticipates Transition to: home with family  Transportation Anticipated: family or friend will provide  Discharge Coordination/Progress: Introduced social work/care coordination role to patient who lives independently in his own home. PCP is Dr. Elliott. Patient shared that he and his wife spend time between their two homes- his in [place] and wife's in [place]. At discharge, patient will go to wife's house ([address] [place] [address]) as this is house has a bedroom and full bathroom (tub and shower) on the first floor (2 [address]AM[address]). Patient has no previous home care, SNF or acute rehab encounters. Performs all ADLs independently and does not have DME in the home. OR is expected today or tomorrow, patient shared that he is hopeful for surgery today. Discussed the possibility of home care or rehab depending on progress post OP and the process of PT/OT evaluation/recommendations. Patient accepting of this information but shared that he fully expects to discharge to the [facility] with his wife's assistance. Care Coordination will continue to follow.

## 2020-11-28 NOTE — PLAN OF CARE
Plan of Care Review  Plan of Care Reviewed With: patient. Admitted to room with left hip fracture. Plan OR for 1100 tomorrow.

## 2020-11-28 NOTE — ASSESSMENT & PLAN NOTE
Stable. Admitted with left femoral neck fracture s/p percutaneous fixation pod#2. Patient denies pain at rest, very mild pain with activity. Has only required Tylenol post operatively.     Plan:  -For multimodal pain management  -continue acetaminophen 650mg po q 6 hours x 5 days. Change to PRN upon discharge.  -oxycodone 5mg po q 4 hours prn severe pain  -avoid morphine due to impaired renal function
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
## 2020-11-28 NOTE — ASSESSMENT & PLAN NOTE
Possible nondisplaced fracture through the anterolateral aspect of the right seventh rib.  No displaced rib fractures are demonstrated and there is no pneumothorax.   - Pain control and encouraged incentive spirometry

## 2020-11-28 NOTE — ASSESSMENT & PLAN NOTE
Hgb 11.3 > 9.6 > 10.5  Drop ing hgb 2/2 acute expected blood loss from surgery  - Outpatient follow-up

## 2020-11-28 NOTE — ASSESSMENT & PLAN NOTE
- Continue amlodipine.  - Hold lisinopril for now; follow-up w/ PCP for BP recheck and to determine if needs to be resumed
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
## 2020-11-28 NOTE — ASSESSMENT & PLAN NOTE
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 He requests to remain FULL CODE, full restorative care.
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
## 2020-11-28 NOTE — ANESTHESIA PREPROCEDURE EVALUATION
Relevant Problems   CARDIOVASCULAR   (+) Benign essential hypertension   (+) Essential hypertension   (+) Nonrheumatic aortic valve insufficiency   (+) Premature beats   (+) Ventricular bigeminy      GASTROINTESTINAL   (+) Esophageal reflux      HEMATOLOGY   (+) Anemia   (+) Iron deficiency anemia      URINARY SYSTEM   (+) Acute kidney injury superimposed on CKD (CMS/HCC)   (+) Hypomagnesemia      Other   (+) Primary malignant neoplasm of sigmoid colon (CMS/HCC)   86 M L hip fx s/f ORIF.     Mets >4      Anesthesia ROS/MED HX    Anesthesia History    Previous anesthetics  No family history of anesthetic complications  No history of anesthetic complications  Pulmonary - neg  Neuro/Psych - neg  Cardiovascular   Valvular problems/murmurs   dyslipidemia   hypertension   Covid19 Test Reviewed and ECG reviewed  Comments: Upper permanent bridge  Hematological    anemia  GI/Hepatic   Hepatitis   liver disease   GERD    Control: well controlled  Renal Disease   chronic renal insufficiency  Endo/Other  History of cancer and colon cancer  Body Habitus: Overweight  ROS/MED HX Comments:    Cardiology: Aortic insufficiency   GI/Hepatic/Renal: Hiatal hernia, asymptomatic   Musculoskeletal: Right 7th rib fracture- no PTX       Past Surgical History:   Procedure Laterality Date   • HERNIA REPAIR      7/2001   • KIDNEY SURGERY Left     kidney cancer       Physical Exam    Airway   Mallampati: II   TM distance: >3 FB   Neck ROM: full  Cardiovascular - normal   Rhythm: regular   Rate: normalPulmonary - normal   clear to auscultation  Other Findings   Upper permanent bridge  Dental - normal      Patient Active Problem List   Diagnosis   • Premature beats   • Essential hypertension   • CKD (chronic kidney disease) stage 3, GFR 30-59 ml/min (CMS/HCC)   • Mixed hyperlipidemia   • Ventricular bigeminy   • Nonrheumatic aortic valve insufficiency   • Gout   • Iron deficiency anemia   • Hypomagnesemia   • Puncture wound of left hand without  foreign body   • Age related osteoporosis   • Chronic kidney disease, stage III (moderate)   • Vitreous degeneration   • Pure hypercholesterolemia   • Primary malignant neoplasm of sigmoid colon (CMS/HCC)   • Nuclear senile cataract   • Lumbago   • Esophageal reflux   • Diverticulosis of colon   • Diplopia   • Benign neoplasm of colon   • Anemia   • Gout   • Benign essential hypertension   • Hip fracture (CMS/HCC)   • Rib fracture   • Acute kidney injury superimposed on CKD (CMS/HCC)   • Closed fracture of left hip (CMS/HCC)        Past Medical History:   Diagnosis Date   • Abnormal EKG    • Arrhythmia    • Chronic kidney disease    • Disease of thyroid gland     1991   • Diverticulitis of colon     Diverticulosis   • GI (gastrointestinal bleed)     X3   last 2015   • Heart murmur    • Hyperlipidemia    • Hypertension    • Infectious viral hepatitis    • Lipid disorder    • Prostate enlargement    • Renal failure        Past Surgical History:   Procedure Laterality Date   • HERNIA REPAIR      7/2001   • KIDNEY SURGERY Left     kidney cancer       Current Facility-Administered Medications   Medication Dose Route Frequency   • acetaminophen  650 mg oral q6h BRENDAN   • [START ON 11/29/2020] amLODIPine  5 mg oral Daily   • [START ON 11/29/2020] atorvastatin  20 mg oral Daily   • ceFAZolin  2 g intravenous 60 min Pre-Op   • glucose  16-32 g of dextrose oral PRN    Or   • dextrose  15-30 g of dextrose oral PRN    Or   • glucagon  1 mg intramuscular PRN    Or   • dextrose in water  25 mL intravenous PRN   • [START ON 11/29/2020] finasteride  5 mg oral Daily   • heparin (porcine)  5,000 Units subcutaneous q8h BRENDAN   • morphine  2 mg intravenous q4h PRN   • oxyCODONE  5 mg oral q4h PRN   • povidone-iodine  1 application Each Nostril 60 min Pre-Op   • sodium chloride 0.9 %   intravenous Continuous       Prior to Admission medications    Medication Sig Start Date End Date Taking? Authorizing Provider   psyllium husk (METAMUCIL  ORAL) Take 2 capsules by mouth daily. Pt is unaware of dosage   Yes Bethany Gaffney MD   simvastatin (ZOCOR) 40 mg tablet Take 1 tablet by mouth daily. 12/18/18  Yes ProviderBethany MD   amLODIPine (NORVASC) 5 mg tablet Take 5 mg by mouth daily. Patient alternates 10 mg one day and 5 mg every other day     ProviderBethany MD   diphenoxylate-atropine (LOMOTIL) 2.5-0.025 mg per tablet Take 1 tablet by mouth daily as needed for diarrhea.  Patient not taking: Reported on 11/9/2020  7/10/19   Alex Elliott MD   finasteride (PROSCAR) 5 mg tablet Take 5 mg by mouth daily.    Timoteo Kim MD   lisinopril (PRINIVIL) 40 mg tablet 60 mg daily. Take 1.5 tablets by oral route every day  He takes 40 mg in the evening and 20 mg in am  5/28/15   Timoteo Kim MD   omeprazole (PriLOSEC) 20 mg capsule Take 20 mg by mouth as needed. 1 capsule daily 30 minutes to 1 hour before a meal Has not taken in last month     Timoteo Kim MD       CBC Results       11/28/20 07/17/20 01/31/20                    0828 1115 1139         WBC 6.53 5.63 5.37         RBC 3.76 3.38 3.56         HGB 12.8 12.0 12.5         HCT 37.5 35.9 38.3         MCV 99.7 106.2 107.6         MCH 34.0 35.5 35.1         MCHC 34.1 33.4 32.6          158 152                       BMP Results       11/28/20 07/17/20 07/02/20                    0828 1115 1115          139 137         K 4.9 4.3 4.9         Cl 106 106 103         CO2 23 23 23         Glucose 121 86 91         BUN 42 19 45         Creatinine 2.0 1.3 2.6         Calcium 9.2 8.9 9.5         Anion Gap 7 10 11         EGFR 31.9 52.5 23.6         Comment for K at 0828 on 11/28/20: Results obtained on plasma. Plasma Potassium values may be up to 0.4 mEQ/L less than serum values. The differences may be greater for patients with high platelet or white cell counts.          No results found for: HCGPREGUR, PREGSERUM, HCG, HCGQUANT    Results from last 7 days   Lab Units  11/28/20  0828   INR INR 1.1   PTT sec 27       ECG 12 lead   ED Interpretation   Rhythm: [NSR]  Rate: 71  P waves: [normal interval]  QRS: [normal QRS]  Axis: [normal]  ST Segments: [no obvious ST elevation or ischemia]  Reviewed with attending      Final Result      X-RAY PELVIS 1 OR 2 VIEWS   ED Interpretation   Fx reviewed with dr murillo      Final Result   IMPRESSION: Left femoral neck fracture.  Findings discussed with Dr. Murillo at   8:30 AM on 11/20/2020.      X-RAY FEMUR LEFT 2+ VIEW   ED Interpretation   See hip      Final Result   IMPRESSION: Left femoral neck fracture.  Findings discussed with Dr. Murillo at   8:30 AM on 11/20/2020.      X-RAY RIBS RIGHT WITH PA CHEST   ED Interpretation   ? 8th rib fx reviewed with dr murillo      Final Result   IMPRESSION:   1.  Possible nondisplaced fracture through the anterolateral aspect of the right   seventh rib.  No displaced rib fractures are demonstrated and there is no   pneumothorax.   2.  Stable large hiatal hernia.      X-RAY ELBOW LEFT 2 VIEWS   ED Interpretation   No fx reviewed with dr beebe      Final Result   IMPRESSION:Somewhat limited exam due to suboptimal positioning.  Findings   suspicious for a possible cortical avulsion type fracture from the lateral   aspect of the radial head.  There is similar linear calcific breast density   along the proximal cortex of the ulna which is also suspicious for possible   avulsion type fracture.  Clinical correlation is advised.            Normal sinus rhythm   Normal ECG   Confirmed by SACHIN FULLER MD ([id number]) on 11/28/2020 1:29:10 PM      Anesthesia Plan    Plan: general    Technique: general endotracheal     Lines and Monitors: PIV     Airway: oral intubation   ASA 3  Induction:    intravenous   Postop Plan:   Patient Disposition: inpatient floor planned admission   Pain Management: IV analgesics
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
## 2020-11-28 NOTE — ASSESSMENT & PLAN NOTE
Unstable. 85 y/o male with h/o CKD stage III, nephrectomy, baseline Cr 1.2, HTN, HLD, heart murmur who was fully independent in all ADLs and IADLs prior to admission. Patient states he lives at home with his wife (second wife). He is a retired physician. His goal is to return home with home care/PT at d/c.     Baseline PPS: 100%  Current PPS: 40%    Plan:  -PT/OT  -home with home care

## 2020-11-28 NOTE — ASSESSMENT & PLAN NOTE
DAVID on CKD stage III; hx of solitary kidney; baseline Cr 1.6  Creatinine 2.0 > 1.5  - Encouraged hydration  - Avoid nephrotoxins
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
## 2020-11-29 ENCOUNTER — ANESTHESIA (INPATIENT)
Dept: OPERATING ROOM | Facility: HOSPITAL | Age: 85
DRG: 481 | End: 2020-11-29
Payer: MEDICARE

## 2020-11-29 ENCOUNTER — APPOINTMENT (INPATIENT)
Dept: RADIOLOGY | Facility: HOSPITAL | Age: 85
DRG: 481 | End: 2020-11-29
Attending: ORTHOPAEDIC SURGERY
Payer: MEDICARE

## 2020-11-29 PROBLEM — S42.402A LEFT ELBOW FRACTURE: Status: ACTIVE | Noted: 2020-11-29

## 2020-11-29 LAB
ANION GAP SERPL CALC-SCNC: 6 MEQ/L (ref 3–15)
BUN SERPL-MCNC: 34 MG/DL (ref 8–20)
CALCIUM SERPL-MCNC: 8.7 MG/DL (ref 8.9–10.3)
CHLORIDE SERPL-SCNC: 107 MEQ/L (ref 98–109)
CO2 SERPL-SCNC: 24 MEQ/L (ref 22–32)
CREAT SERPL-MCNC: 1.5 MG/DL (ref 0.8–1.3)
ERYTHROCYTE [DISTWIDTH] IN BLOOD BY AUTOMATED COUNT: 15.1 % (ref 11.6–14.4)
GFR SERPL CREATININE-BSD FRML MDRD: 44.5 ML/MIN/1.73M*2
GLUCOSE SERPL-MCNC: 108 MG/DL (ref 70–99)
HCT VFR BLDCO AUTO: 36.3 % (ref 40.1–51)
HGB BLD-MCNC: 12.1 G/DL (ref 13.7–17.5)
MCH RBC QN AUTO: 33.8 PG (ref 28–33.2)
MCHC RBC AUTO-ENTMCNC: 33.3 G/DL (ref 32.2–36.5)
MCV RBC AUTO: 101.4 FL (ref 83–98)
PDW BLD AUTO: 9.9 FL (ref 9.4–12.4)
PLATELET # BLD AUTO: 123 K/UL (ref 150–350)
POTASSIUM SERPL-SCNC: 5 MEQ/L (ref 3.6–5.1)
RBC # BLD AUTO: 3.58 M/UL (ref 4.5–5.8)
SODIUM SERPL-SCNC: 137 MEQ/L (ref 136–144)
WBC # BLD AUTO: 6.28 K/UL (ref 3.8–10.5)

## 2020-11-29 PROCEDURE — 97162 PT EVAL MOD COMPLEX 30 MIN: CPT | Mod: GP

## 2020-11-29 PROCEDURE — C1769 GUIDE WIRE: HCPCS | Performed by: ORTHOPAEDIC SURGERY

## 2020-11-29 PROCEDURE — 63600000 HC DRUGS/DETAIL CODE: Performed by: STUDENT IN AN ORGANIZED HEALTH CARE EDUCATION/TRAINING PROGRAM

## 2020-11-29 PROCEDURE — 27200000 HC STERILE SUPPLY: Performed by: ORTHOPAEDIC SURGERY

## 2020-11-29 PROCEDURE — 71000001 HC PACU PHASE 1 INITIAL 30MIN: Performed by: ORTHOPAEDIC SURGERY

## 2020-11-29 PROCEDURE — 12000000 HC ROOM AND CARE MED/SURG

## 2020-11-29 PROCEDURE — 36000014 HC OR LEVEL 4 EA ADDL MIN: Performed by: ORTHOPAEDIC SURGERY

## 2020-11-29 PROCEDURE — 97166 OT EVAL MOD COMPLEX 45 MIN: CPT | Mod: GO

## 2020-11-29 PROCEDURE — 99232 SBSQ HOSP IP/OBS MODERATE 35: CPT | Performed by: HOSPITALIST

## 2020-11-29 PROCEDURE — 73502 X-RAY EXAM HIP UNI 2-3 VIEWS: CPT | Mod: LT

## 2020-11-29 PROCEDURE — 36000004 HC OR LEVEL 4 INITIAL 30MIN: Performed by: ORTHOPAEDIC SURGERY

## 2020-11-29 PROCEDURE — 80048 BASIC METABOLIC PNL TOTAL CA: CPT | Performed by: HOSPITALIST

## 2020-11-29 PROCEDURE — 25800000 HC PHARMACY IV SOLUTIONS: Performed by: HOSPITALIST

## 2020-11-29 PROCEDURE — 71000011 HC PACU PHASE 1 EA ADDL MIN: Performed by: ORTHOPAEDIC SURGERY

## 2020-11-29 PROCEDURE — 85027 COMPLETE CBC AUTOMATED: CPT | Performed by: HOSPITALIST

## 2020-11-29 PROCEDURE — 97116 GAIT TRAINING THERAPY: CPT | Mod: GP

## 2020-11-29 PROCEDURE — 63700000 HC SELF-ADMINISTRABLE DRUG: Performed by: HOSPITALIST

## 2020-11-29 PROCEDURE — 0QS704Z REPOSITION LEFT UPPER FEMUR WITH INTERNAL FIXATION DEVICE, OPEN APPROACH: ICD-10-PCS | Performed by: ORTHOPAEDIC SURGERY

## 2020-11-29 PROCEDURE — 27800000 HC SUPPLY/IMPLANTS: Performed by: ORTHOPAEDIC SURGERY

## 2020-11-29 PROCEDURE — 25800000 HC PHARMACY IV SOLUTIONS: Performed by: STUDENT IN AN ORGANIZED HEALTH CARE EDUCATION/TRAINING PROGRAM

## 2020-11-29 PROCEDURE — 37000001 HC ANESTHESIA GENERAL: Performed by: ORTHOPAEDIC SURGERY

## 2020-11-29 PROCEDURE — 36415 COLL VENOUS BLD VENIPUNCTURE: CPT | Performed by: HOSPITALIST

## 2020-11-29 PROCEDURE — C1713 ANCHOR/SCREW BN/BN,TIS/BN: HCPCS | Performed by: ORTHOPAEDIC SURGERY

## 2020-11-29 PROCEDURE — 25000000 HC PHARMACY GENERAL: Performed by: STUDENT IN AN ORGANIZED HEALTH CARE EDUCATION/TRAINING PROGRAM

## 2020-11-29 DEVICE — SCREW CANNULATED 7.3MMX85MM: Type: IMPLANTABLE DEVICE | Site: HIP | Status: FUNCTIONAL

## 2020-11-29 DEVICE — IMPLANTABLE DEVICE: Type: IMPLANTABLE DEVICE | Site: HIP | Status: FUNCTIONAL

## 2020-11-29 RX ORDER — FENTANYL CITRATE 50 UG/ML
50 INJECTION, SOLUTION INTRAMUSCULAR; INTRAVENOUS
Status: DISCONTINUED | OUTPATIENT
Start: 2020-11-29 | End: 2020-11-29 | Stop reason: HOSPADM

## 2020-11-29 RX ORDER — ONDANSETRON HYDROCHLORIDE 2 MG/ML
4 INJECTION, SOLUTION INTRAVENOUS
Status: DISCONTINUED | OUTPATIENT
Start: 2020-11-29 | End: 2020-12-01 | Stop reason: HOSPADM

## 2020-11-29 RX ORDER — LIDOCAINE HYDROCHLORIDE 10 MG/ML
INJECTION, SOLUTION INFILTRATION; PERINEURAL AS NEEDED
Status: DISCONTINUED | OUTPATIENT
Start: 2020-11-29 | End: 2020-11-29 | Stop reason: SURG

## 2020-11-29 RX ORDER — PROPOFOL 10 MG/ML
INJECTION, EMULSION INTRAVENOUS AS NEEDED
Status: DISCONTINUED | OUTPATIENT
Start: 2020-11-29 | End: 2020-11-29 | Stop reason: SURG

## 2020-11-29 RX ORDER — DEXTROSE 40 %
15-30 GEL (GRAM) ORAL AS NEEDED
Status: DISCONTINUED | OUTPATIENT
Start: 2020-11-29 | End: 2020-11-29 | Stop reason: SDUPTHER

## 2020-11-29 RX ORDER — GLYCOPYRROLATE 0.6MG/3ML
SYRINGE (ML) INTRAVENOUS AS NEEDED
Status: DISCONTINUED | OUTPATIENT
Start: 2020-11-29 | End: 2020-11-29 | Stop reason: SURG

## 2020-11-29 RX ORDER — HYDROMORPHONE HYDROCHLORIDE 1 MG/ML
0.5 INJECTION, SOLUTION INTRAMUSCULAR; INTRAVENOUS; SUBCUTANEOUS
Status: DISCONTINUED | OUTPATIENT
Start: 2020-11-29 | End: 2020-11-29 | Stop reason: HOSPADM

## 2020-11-29 RX ORDER — NEOSTIGMINE METHYLSULFATE 1 MG/ML
INJECTION INTRAVENOUS AS NEEDED
Status: DISCONTINUED | OUTPATIENT
Start: 2020-11-29 | End: 2020-11-29 | Stop reason: SURG

## 2020-11-29 RX ORDER — NAPROXEN SODIUM 220 MG/1
81 TABLET, FILM COATED ORAL 2 TIMES DAILY
Status: DISCONTINUED | OUTPATIENT
Start: 2020-11-30 | End: 2020-11-29

## 2020-11-29 RX ORDER — DEXAMETHASONE SODIUM PHOSPHATE 4 MG/ML
INJECTION, SOLUTION INTRA-ARTICULAR; INTRALESIONAL; INTRAMUSCULAR; INTRAVENOUS; SOFT TISSUE AS NEEDED
Status: DISCONTINUED | OUTPATIENT
Start: 2020-11-29 | End: 2020-11-29 | Stop reason: SURG

## 2020-11-29 RX ORDER — DEXTROSE 50 % IN WATER (D50W) INTRAVENOUS SYRINGE
25 AS NEEDED
Status: DISCONTINUED | OUTPATIENT
Start: 2020-11-29 | End: 2020-11-29 | Stop reason: HOSPADM

## 2020-11-29 RX ORDER — HYDROMORPHONE HYDROCHLORIDE 1 MG/ML
0.5 INJECTION, SOLUTION INTRAMUSCULAR; INTRAVENOUS; SUBCUTANEOUS
Status: DISCONTINUED | OUTPATIENT
Start: 2020-11-29 | End: 2020-12-01 | Stop reason: HOSPADM

## 2020-11-29 RX ORDER — ROCURONIUM BROMIDE 10 MG/ML
INJECTION, SOLUTION INTRAVENOUS AS NEEDED
Status: DISCONTINUED | OUTPATIENT
Start: 2020-11-29 | End: 2020-11-29 | Stop reason: SURG

## 2020-11-29 RX ORDER — IBUPROFEN 200 MG
16-32 TABLET ORAL AS NEEDED
Status: DISCONTINUED | OUTPATIENT
Start: 2020-11-29 | End: 2020-11-29 | Stop reason: SDUPTHER

## 2020-11-29 RX ORDER — PHENYLEPHRINE HYDROCHLORIDE 10 MG/ML
INJECTION INTRAVENOUS AS NEEDED
Status: DISCONTINUED | OUTPATIENT
Start: 2020-11-29 | End: 2020-11-29 | Stop reason: SURG

## 2020-11-29 RX ORDER — ONDANSETRON HYDROCHLORIDE 2 MG/ML
4 INJECTION, SOLUTION INTRAVENOUS
Status: DISCONTINUED | OUTPATIENT
Start: 2020-11-29 | End: 2020-11-29 | Stop reason: HOSPADM

## 2020-11-29 RX ORDER — FENTANYL CITRATE 50 UG/ML
INJECTION, SOLUTION INTRAMUSCULAR; INTRAVENOUS AS NEEDED
Status: DISCONTINUED | OUTPATIENT
Start: 2020-11-29 | End: 2020-11-29 | Stop reason: SURG

## 2020-11-29 RX ORDER — CEFAZOLIN SODIUM 1 G/50ML
SOLUTION INTRAVENOUS AS NEEDED
Status: DISCONTINUED | OUTPATIENT
Start: 2020-11-29 | End: 2020-11-29 | Stop reason: SURG

## 2020-11-29 RX ORDER — CEFAZOLIN SODIUM 2 G/50ML
2 SOLUTION INTRAVENOUS
Status: DISCONTINUED | OUTPATIENT
Start: 2020-11-29 | End: 2020-11-29

## 2020-11-29 RX ORDER — FENTANYL CITRATE 50 UG/ML
50 INJECTION, SOLUTION INTRAMUSCULAR; INTRAVENOUS
Status: DISCONTINUED | OUTPATIENT
Start: 2020-11-29 | End: 2020-12-01 | Stop reason: HOSPADM

## 2020-11-29 RX ORDER — DEXTROSE 50 % IN WATER (D50W) INTRAVENOUS SYRINGE
25 AS NEEDED
Status: DISCONTINUED | OUTPATIENT
Start: 2020-11-29 | End: 2020-12-01 | Stop reason: HOSPADM

## 2020-11-29 RX ORDER — IBUPROFEN 200 MG
16-32 TABLET ORAL AS NEEDED
Status: DISCONTINUED | OUTPATIENT
Start: 2020-11-29 | End: 2020-11-29 | Stop reason: HOSPADM

## 2020-11-29 RX ORDER — DEXTROSE 40 %
15-30 GEL (GRAM) ORAL AS NEEDED
Status: DISCONTINUED | OUTPATIENT
Start: 2020-11-29 | End: 2020-11-29 | Stop reason: HOSPADM

## 2020-11-29 RX ORDER — HYDROMORPHONE HYDROCHLORIDE 1 MG/ML
INJECTION, SOLUTION INTRAMUSCULAR; INTRAVENOUS; SUBCUTANEOUS AS NEEDED
Status: DISCONTINUED | OUTPATIENT
Start: 2020-11-29 | End: 2020-11-29 | Stop reason: SURG

## 2020-11-29 RX ADMIN — PROPOFOL 100 MG: 10 INJECTION, EMULSION INTRAVENOUS at 09:56

## 2020-11-29 RX ADMIN — FINASTERIDE 5 MG: 5 TABLET, FILM COATED ORAL at 14:17

## 2020-11-29 RX ADMIN — PROPOFOL 30 MG: 10 INJECTION, EMULSION INTRAVENOUS at 11:10

## 2020-11-29 RX ADMIN — ROCURONIUM BROMIDE 20 MG: 10 INJECTION, SOLUTION INTRAVENOUS at 10:33

## 2020-11-29 RX ADMIN — Medication 0.4 MG: at 11:06

## 2020-11-29 RX ADMIN — FENTANYL CITRATE 50 MCG: 50 INJECTION INTRAMUSCULAR; INTRAVENOUS at 11:40

## 2020-11-29 RX ADMIN — SUGAMMADEX 100 MG: 100 INJECTION, SOLUTION INTRAVENOUS at 11:28

## 2020-11-29 RX ADMIN — SODIUM CHLORIDE: 900 INJECTION, SOLUTION INTRAVENOUS at 09:51

## 2020-11-29 RX ADMIN — DEXAMETHASONE SODIUM PHOSPHATE 4 MG: 4 INJECTION, SOLUTION INTRA-ARTICULAR; INTRALESIONAL; INTRAMUSCULAR; INTRAVENOUS; SOFT TISSUE at 10:17

## 2020-11-29 RX ADMIN — PHENYLEPHRINE HYDROCHLORIDE 100 MCG: 10 INJECTION INTRAVENOUS at 10:13

## 2020-11-29 RX ADMIN — CEFAZOLIN 2 G: 330 INJECTION, POWDER, FOR SOLUTION INTRAMUSCULAR; INTRAVENOUS at 17:27

## 2020-11-29 RX ADMIN — ROCURONIUM BROMIDE 50 MG: 10 INJECTION, SOLUTION INTRAVENOUS at 09:57

## 2020-11-29 RX ADMIN — PHENYLEPHRINE HYDROCHLORIDE 100 MCG: 10 INJECTION INTRAVENOUS at 10:43

## 2020-11-29 RX ADMIN — ACETAMINOPHEN 650 MG: 325 TABLET, FILM COATED ORAL at 14:17

## 2020-11-29 RX ADMIN — ACETAMINOPHEN 650 MG: 325 TABLET, FILM COATED ORAL at 17:48

## 2020-11-29 RX ADMIN — PHENYLEPHRINE HYDROCHLORIDE 100 MCG: 10 INJECTION INTRAVENOUS at 11:19

## 2020-11-29 RX ADMIN — ACETAMINOPHEN 650 MG: 325 TABLET, FILM COATED ORAL at 23:27

## 2020-11-29 RX ADMIN — FENTANYL CITRATE 100 MCG: 50 INJECTION, SOLUTION INTRAMUSCULAR; INTRAVENOUS at 09:56

## 2020-11-29 RX ADMIN — PROPOFOL 20 MG: 10 INJECTION, EMULSION INTRAVENOUS at 11:06

## 2020-11-29 RX ADMIN — LIDOCAINE HYDROCHLORIDE 5 ML: 10 INJECTION, SOLUTION INFILTRATION; PERINEURAL at 09:55

## 2020-11-29 RX ADMIN — ACETAMINOPHEN 650 MG: 325 TABLET, FILM COATED ORAL at 06:46

## 2020-11-29 RX ADMIN — CEFAZOLIN SODIUM 2 G: 1 SOLUTION INTRAVENOUS at 10:08

## 2020-11-29 RX ADMIN — NEOSTIGMINE METHYLSULFATE 4.5 MG: 1 INJECTION INTRAVENOUS at 11:06

## 2020-11-29 RX ADMIN — HYDROMORPHONE HYDROCHLORIDE 0.5 MG: 1 INJECTION, SOLUTION INTRAMUSCULAR; INTRAVENOUS; SUBCUTANEOUS at 10:30

## 2020-11-29 RX ADMIN — PHENYLEPHRINE HYDROCHLORIDE 100 MCG: 10 INJECTION INTRAVENOUS at 10:18

## 2020-11-29 RX ADMIN — AMLODIPINE BESYLATE 5 MG: 5 TABLET ORAL at 08:59

## 2020-11-29 RX ADMIN — PHENYLEPHRINE HYDROCHLORIDE 100 MCG: 10 INJECTION INTRAVENOUS at 10:28

## 2020-11-29 RX ADMIN — PHENYLEPHRINE HYDROCHLORIDE 100 MCG: 10 INJECTION INTRAVENOUS at 11:15

## 2020-11-29 RX ADMIN — SODIUM CHLORIDE: 9 INJECTION, SOLUTION INTRAVENOUS at 00:00

## 2020-11-29 ASSESSMENT — COGNITIVE AND FUNCTIONAL STATUS - GENERAL
HELP NEEDED FOR PERSONAL GROOMING: 3 - A LITTLE
WALKING IN HOSPITAL ROOM: 3 - A LITTLE
HELP NEEDED FOR BATHING: 3 - A LITTLE
DRESSING REGULAR UPPER BODY CLOTHING: 3 - A LITTLE
STANDING UP FROM CHAIR USING ARMS: 3 - A LITTLE
CLIMB 3 TO 5 STEPS WITH RAILING: 3 - A LITTLE
DRESSING REGULAR LOWER BODY CLOTHING: 3 - A LITTLE
TOILETING: 3 - A LITTLE
AFFECT: WFL
AFFECT: WNL
MOVING TO AND FROM BED TO CHAIR: 3 - A LITTLE
EATING MEALS: 4 - NONE

## 2020-11-29 ASSESSMENT — PAIN SCALES - GENERAL: PAIN_LEVEL: 0
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
## 2020-11-29 NOTE — PROGRESS NOTES
Hospital Medicine Service -  Daily Progress Note       SUBJECTIVE   Interval History: Pt seen and examined post op. Denies any Cp or SOB. Currently pain adequately controlled.     OBJECTIVE      Vital signs in last 24 hours:  Temp:  [36.2 °C (97.2 °F)-37.8 °C (100 °F)] 36.2 °C (97.2 °F)  Heart Rate:  [62-80] 64  Resp:  [12-19] 18  BP: (103-144)/(55-76) 110/58    Intake/Output Summary (Last 24 hours) at 11/29/2020 1421  Last data filed at 11/29/2020 1127  Gross per 24 hour   Intake 700 ml   Output 451 ml   Net 249 ml       PHYSICAL EXAMINATION      Physical Exam   Constitutional:       Appearance: Normal appearance. He is not ill-appearing.   HENT:      Head: Normocephalic and atraumatic.   Eyes:      Conjunctiva/sclera: Conjunctivae normal.   Neck:      Musculoskeletal: Neck supple.   Cardiovascular:      Rate and Rhythm: Normal rate and regular rhythm.      Heart sounds: Murmur present.      Comments: Soft systolic murmur  Pulmonary:      Effort: Pulmonary effort is normal.      Breath sounds: Normal breath sounds.   Abdominal:      General: Bowel sounds are normal. There is no distension.      Palpations: Abdomen is soft.      Tenderness: There is no abdominal tenderness.   Musculoskeletal:      Comments: Left hip dressing  Skin:     General: Skin is warm and dry.   Neurological:      Mental Status: He is alert and oriented to person, place, and time.   Psychiatric:         Mood and Affect: Mood normal.         Behavior: Behavior normal.    LINES, CATHETERS, DRAINS, AIRWAYS, AND WOUNDS   Lines, Drains, Airways, Wounds:  Peripheral IV 11/28/20 Left Antecubital (Active)   Number of days: 1       Surgical Incision Hip Left (Active)   Number of days: 0       Comments:      LABS / IMAGING / TELE      Labs  Reviewed  Results from last 7 days   Lab Units 11/29/20  0513 11/28/20  0828   WBC K/uL 6.28 6.53   HEMOGLOBIN g/dL 12.1* 12.8*   HEMATOCRIT % 36.3* 37.5*   PLATELETS K/uL 123* 136*     Results from last 7 days    Lab Units 11/29/20  0513 11/28/20  0828   SODIUM mEQ/L 137 136   POTASSIUM mEQ/L 5.0 4.9   CHLORIDE mEQ/L 107 106   CO2 mEQ/L 24 23   BUN mg/dL 34* 42*   CREATININE mg/dL 1.5* 2.0*   GLUCOSE mg/dL 108* 121*   CALCIUM mg/dL 8.7* 9.2       Imaging  I have independently reviewed the pertinent imaging from the last 24 hrs.    ECG/Telemetry  n/a    ASSESSMENT AND PLAN      Hip fracture (CMS/[facility])  Assessment & Plan  Left femoral neck fracture s/p mechanical fall  S/p Open reduction internal fixation of left valgus impacted femoral neck fracture POD 0  Prn pain control, DVT proph  Encouraged incentive spirometry  PT/OT per ortho recs  Follow post op labs    Left elbow fracture  Assessment & Plan  Possible fracture on admission imaging.   Repeat Xrays ordered by ortho  NWB for now    Acute kidney injury superimposed on CKD (CMS/[facility])  Assessment & Plan  DAVID on CKD stage III  Creatinine 2.0 on admission.  Patient reports having a solitary kidney. Cr improved to 1.5 today  Baseline creatinine around 1.6.  Encouraged hydration.  Avoid nephrotoxins.  Monitor BMP.      Rib fracture  Assessment & Plan  Possible nondisplaced fracture through the anterolateral aspect of the right   seventh rib.  No displaced rib fractures are demonstrated and there is no   pneumothorax.     Pain control and encouraged incentive spirometry.    Pure hypercholesterolemia  Assessment & Plan  Cont statin    Iron deficiency anemia  Assessment & Plan  hgb appears to be at baseline  monitor    Essential hypertension  Assessment & Plan  Continue amlodipine.  Hold lisinopril for now for preop and DAVID.  Monitor blood pressures.       VTE Assessment: Padua VTE Score: 6  VTE Prophylaxis Plan: SCDs  Code Status: Full Code  Estimated Discharge Date: 12/2/2020  Disposition Planning: home vs SNF     Alan Marie MD  11/29/2020

## 2020-11-29 NOTE — ASSESSMENT & PLAN NOTE
Possible fracture on admission imaging.   Repeat Xrays -Small ossific density located along the anterior lateral cortex of the radial head of uncertain significance.  The possibility of a small avulsion type injury is not entirely excluded and clinical correlation is advised.  There is no evidence of an elbow joint effusion.  Denies elbow sx  - Ortho following; case discussed, low suspicion for fx no acute intervention recommended  - Outpatient follow up with ortho

## 2020-11-29 NOTE — PLAN OF CARE
Problem: Adult Inpatient Plan of Care  Goal: Plan of Care Review  Outcome: Progressing  Flowsheets (Taken 11/29/2020 3505)  Plan of Care Reviewed With: patient  Outcome Summary: OT Eval completed s/p L LE ORIF. Current orders state WBAT L LE, NWB L UE. Cl Sup required for LB drsg in sitting, Min A for transfers. Rec. home with HHS and support from wife.

## 2020-11-29 NOTE — PLAN OF CARE
Plan of Care Review  Plan of Care Reviewed With: patient  Progress: no change  Outcome Summary: Patient remains on bedrest, NWB to LLE. Minimal c/o pain during shift, relief w/ PRN Tylenol and oxycodone. Patient has been NPO w/ IVF up since midnight. OR planned for 11am.

## 2020-11-29 NOTE — ANESTHESIA POSTPROCEDURE EVALUATION
Patient: Efrain Cardona Jr.    Procedure Summary     Date: 11/29/20 Room / Location: [facility] OR  / [facility] OR [facility]    Anesthesia Start: 0951 Anesthesia Stop: 1136    Procedure: OPEN REDUCTION INTERNAL FIXATION HIP/FEMUR PINNING/CANNULATED SCREWS (Left Hip) Diagnosis:       Closed fracture of left hip, initial encounter (CMS/[facility])      (Closed fracture of left hip, initial encounter (CMS/[facility]) [S72.002A])    Surgeon: Efrain Waggoner MD Responsible Provider: Aries Becker MD    Anesthesia Type: general ASA Status: 3          Anesthesia Type: general  PACU Vitals     No data found in the last 10 encounters.            Anesthesia Post Evaluation    Pain score: 0  Pain management: adequate  Patient location during evaluation: PACU  Patient participation: complete - patient participated  Level of consciousness: awake and alert  Cardiovascular status: acceptable  Airway Patency: adequate  Respiratory status: acceptable  Hydration status: acceptable  Anesthetic complications: no

## 2020-11-29 NOTE — PLAN OF CARE
Problem: Adult Inpatient Plan of Care  Goal: Plan of Care Review  Outcome: Progressing  Flowsheets (Taken 11/29/2020 7468)  Progress: improving  Plan of Care Reviewed With: patient  Outcome Summary: Pt. seen for PT eval. CS-min Ax1 for stand pivot to recliner. Dispo for d/c home w/ HHS and assist.

## 2020-11-29 NOTE — OR SURGEON
Pre-Procedure patient identification:  I am the primary operating surgeon/proceduralist and I have identified the patient and confirmed laterality is left on 11/29/20 at 9:47 AM Efrain Waggoner MD  Phone Number: [phone]
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
## 2020-11-29 NOTE — ANESTHESIA PROCEDURE NOTES
Airway  Urgency: elective    Start Time: 11/29/2020 9:59 AM    General Information and Staff    Patient location during procedure: OR  Anesthesiologist: Aries Becker MD    Indications and Patient Condition  Indications for airway management: anesthesia  Sedation level: deep  Preoxygenated: yes  Patient position: sniffing  Mask difficulty assessment: 1 - vent by mask    Final Airway Details  Final airway type: endotracheal airway      Successful airway: ETT    Successful intubation technique: video laryngoscopy  Facilitating devices/methods: intubating stylet  Endotracheal tube insertion site: oral  Blade: [place]  Blade size: #4  ETT size (mm): 8.0  Placement verified by: chest auscultation and capnometry   Measured from: lips  Number of attempts at approach: 1  Number of other approaches attempted: 0  Atraumatic airway insertion

## 2020-11-29 NOTE — ANESTHESIOLOGIST PRE-PROCEDURE ATTESTATION
Pre-Procedure Patient Identification:  I am the Primary Anesthesiologist and have identified the patient on 11/29/20 at 9:31 AM.   I have confirmed the following procedure(s) OPEN REDUCTION INTERNAL FIXATION HIP/FEMUR PINNING/CANNULATED SCREWS (L) will be performed by the following surgeon/proceduralist Efrain Waggoner MD.

## 2020-11-30 ENCOUNTER — APPOINTMENT (INPATIENT)
Dept: RADIOLOGY | Facility: HOSPITAL | Age: 85
DRG: 481 | End: 2020-11-30
Attending: HOSPITALIST
Payer: MEDICARE

## 2020-11-30 PROBLEM — R33.9 URINARY RETENTION: Status: ACTIVE | Noted: 2020-11-30

## 2020-11-30 LAB
ALBUMIN SERPL-MCNC: 3.5 G/DL (ref 3.4–5)
ALP SERPL-CCNC: 48 IU/L (ref 35–126)
ALT SERPL-CCNC: 15 IU/L (ref 16–63)
ANION GAP SERPL CALC-SCNC: 10 MEQ/L (ref 3–15)
AST SERPL-CCNC: 24 IU/L (ref 15–41)
BILIRUB DIRECT SERPL-MCNC: 0.1 MG/DL
BILIRUB SERPL-MCNC: 0.4 MG/DL (ref 0.3–1.2)
BUN SERPL-MCNC: 33 MG/DL (ref 8–20)
CALCIUM SERPL-MCNC: 8.3 MG/DL (ref 8.9–10.3)
CHLORIDE SERPL-SCNC: 103 MEQ/L (ref 98–109)
CO2 SERPL-SCNC: 22 MEQ/L (ref 22–32)
CREAT SERPL-MCNC: 1.5 MG/DL (ref 0.8–1.3)
ERYTHROCYTE [DISTWIDTH] IN BLOOD BY AUTOMATED COUNT: 14.6 % (ref 11.6–14.4)
GFR SERPL CREATININE-BSD FRML MDRD: 44.5 ML/MIN/1.73M*2
GLUCOSE SERPL-MCNC: 135 MG/DL (ref 70–99)
HCT VFR BLDCO AUTO: 34 % (ref 40.1–51)
HGB BLD-MCNC: 11.3 G/DL (ref 13.7–17.5)
MCH RBC QN AUTO: 33.6 PG (ref 28–33.2)
MCHC RBC AUTO-ENTMCNC: 33.2 G/DL (ref 32.2–36.5)
MCV RBC AUTO: 101.2 FL (ref 83–98)
PDW BLD AUTO: 10.1 FL (ref 9.4–12.4)
PLATELET # BLD AUTO: 133 K/UL (ref 150–350)
POTASSIUM SERPL-SCNC: 5.2 MEQ/L (ref 3.6–5.1)
PROT SERPL-MCNC: 6 G/DL (ref 6–8.2)
RBC # BLD AUTO: 3.36 M/UL (ref 4.5–5.8)
SODIUM SERPL-SCNC: 135 MEQ/L (ref 136–144)
WBC # BLD AUTO: 7.03 K/UL (ref 3.8–10.5)

## 2020-11-30 PROCEDURE — 36415 COLL VENOUS BLD VENIPUNCTURE: CPT | Performed by: HOSPITALIST

## 2020-11-30 PROCEDURE — 97116 GAIT TRAINING THERAPY: CPT | Mod: GP

## 2020-11-30 PROCEDURE — 63700000 HC SELF-ADMINISTRABLE DRUG: Performed by: HOSPITALIST

## 2020-11-30 PROCEDURE — 63600000 HC DRUGS/DETAIL CODE: Performed by: STUDENT IN AN ORGANIZED HEALTH CARE EDUCATION/TRAINING PROGRAM

## 2020-11-30 PROCEDURE — 97530 THERAPEUTIC ACTIVITIES: CPT | Mod: GP

## 2020-11-30 PROCEDURE — 80048 BASIC METABOLIC PNL TOTAL CA: CPT | Performed by: HOSPITALIST

## 2020-11-30 PROCEDURE — 63700000 HC SELF-ADMINISTRABLE DRUG: Performed by: STUDENT IN AN ORGANIZED HEALTH CARE EDUCATION/TRAINING PROGRAM

## 2020-11-30 PROCEDURE — 99233 SBSQ HOSP IP/OBS HIGH 50: CPT | Performed by: HOSPITALIST

## 2020-11-30 PROCEDURE — 97535 SELF CARE MNGMENT TRAINING: CPT | Mod: GO

## 2020-11-30 PROCEDURE — 12000000 HC ROOM AND CARE MED/SURG

## 2020-11-30 PROCEDURE — 80076 HEPATIC FUNCTION PANEL: CPT | Performed by: HOSPITALIST

## 2020-11-30 PROCEDURE — 73502 X-RAY EXAM HIP UNI 2-3 VIEWS: CPT | Mod: LT

## 2020-11-30 PROCEDURE — 85027 COMPLETE CBC AUTOMATED: CPT | Performed by: HOSPITALIST

## 2020-11-30 RX ORDER — SENNOSIDES 8.6 MG/1
2 TABLET ORAL NIGHTLY
Status: DISCONTINUED | OUTPATIENT
Start: 2020-11-30 | End: 2020-12-01 | Stop reason: HOSPADM

## 2020-11-30 RX ORDER — POLYETHYLENE GLYCOL 3350 17 G/17G
17 POWDER, FOR SOLUTION ORAL DAILY
Status: DISCONTINUED | OUTPATIENT
Start: 2020-11-30 | End: 2020-12-01 | Stop reason: HOSPADM

## 2020-11-30 RX ORDER — ADHESIVE BANDAGE
30 BANDAGE TOPICAL ONCE
Status: COMPLETED | OUTPATIENT
Start: 2020-11-30 | End: 2020-11-30

## 2020-11-30 RX ORDER — BISACODYL 10 MG/1
10 SUPPOSITORY RECTAL DAILY PRN
Status: DISCONTINUED | OUTPATIENT
Start: 2020-11-30 | End: 2020-12-01 | Stop reason: HOSPADM

## 2020-11-30 RX ORDER — TAMSULOSIN HYDROCHLORIDE 0.4 MG/1
0.4 CAPSULE ORAL DAILY
Status: DISCONTINUED | OUTPATIENT
Start: 2020-11-30 | End: 2020-12-01 | Stop reason: HOSPADM

## 2020-11-30 RX ADMIN — SENNOSIDES 2 TABLET: 8.6 TABLET, FILM COATED ORAL at 22:20

## 2020-11-30 RX ADMIN — CEFAZOLIN 2 G: 330 INJECTION, POWDER, FOR SOLUTION INTRAMUSCULAR; INTRAVENOUS at 02:54

## 2020-11-30 RX ADMIN — APIXABAN 2.5 MG: 2.5 TABLET, FILM COATED ORAL at 13:20

## 2020-11-30 RX ADMIN — POLYETHYLENE GLYCOL 3350 17 G: 17 POWDER, FOR SOLUTION ORAL at 09:33

## 2020-11-30 RX ADMIN — ACETAMINOPHEN 650 MG: 325 TABLET, FILM COATED ORAL at 06:12

## 2020-11-30 RX ADMIN — ACETAMINOPHEN 650 MG: 325 TABLET, FILM COATED ORAL at 20:42

## 2020-11-30 RX ADMIN — FINASTERIDE 5 MG: 5 TABLET, FILM COATED ORAL at 09:33

## 2020-11-30 RX ADMIN — AMLODIPINE BESYLATE 5 MG: 5 TABLET ORAL at 09:33

## 2020-11-30 RX ADMIN — TAMSULOSIN HYDROCHLORIDE 0.4 MG: 0.4 CAPSULE ORAL at 12:21

## 2020-11-30 RX ADMIN — MAGNESIUM HYDROXIDE 30 ML: 400 SUSPENSION ORAL at 11:43

## 2020-11-30 RX ADMIN — APIXABAN 2.5 MG: 2.5 TABLET, FILM COATED ORAL at 20:41

## 2020-11-30 RX ADMIN — ATORVASTATIN CALCIUM 20 MG: 20 TABLET, FILM COATED ORAL at 09:33

## 2020-11-30 ASSESSMENT — COGNITIVE AND FUNCTIONAL STATUS - GENERAL
DRESSING REGULAR LOWER BODY CLOTHING: 3 - A LITTLE
DRESSING REGULAR UPPER BODY CLOTHING: 3 - A LITTLE
TOILETING: 3 - A LITTLE
WALKING IN HOSPITAL ROOM: 4 - NONE
STANDING UP FROM CHAIR USING ARMS: 4 - NONE
AFFECT: WFL
CLIMB 3 TO 5 STEPS WITH RAILING: 4 - NONE
HELP NEEDED FOR PERSONAL GROOMING: 3 - A LITTLE
EATING MEALS: 4 - NONE
HELP NEEDED FOR BATHING: 3 - A LITTLE
AFFECT: WFL
MOVING TO AND FROM BED TO CHAIR: 4 - NONE
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
## 2020-11-30 NOTE — PLAN OF CARE
Problem: Adult Inpatient Plan of Care  Goal: Plan of Care Review  Outcome: Progressing  Flowsheets (Taken 11/30/2020 0857)  Progress: improving  Plan of Care Reviewed With: patient  Outcome Summary: OT Session complete

## 2020-11-30 NOTE — NURSING NOTE
Patient has no current orders for anticoagulation post-op. Paged [facility] who had me page orthopedics. Per Dr. Deng, patient and [facility] MD to discuss options in the morning due to patients inability to tolerate aspirin.

## 2020-11-30 NOTE — NURSING NOTE
Pt voided 100cc, RN bladder scanned after for 544cc. Pt would like to wait til noon to try to void again before being straight cathed again. Dr. Rich notified. Will continue to monitor.

## 2020-11-30 NOTE — NURSING NOTE
Pt voided 225cc, PVR done and recorded at 645cc. RN straight cath patient for 700cc @1420. DTV at 2020.

## 2020-11-30 NOTE — PLAN OF CARE
Problem: Adult Inpatient Plan of Care  Goal: Plan of Care Review  Flowsheets (Taken 11/30/2020 0451)  Progress: improving  Plan of Care Reviewed With: patient  Outcome Summary: Pt POD #1 L hip ORIF. Reports good appetite here and PTA. UBW is 146# - no significant wt change noted. Pt eats a well balanced diet at home and reports being very active (works out 3x per week). K+ high at 5.2.     Goals:   Pt will consume >/= 75% of meals (3 meals daily).     Recommendations:   Agree with Regular diet as ordered. Can add 2 gm K restriction if K+ remains elevated.   Monitor PO intake, wt and labs especially electrolytes.

## 2020-11-30 NOTE — PLAN OF CARE
Plan of Care Review  Plan of Care Reviewed With: patient  Progress: improving  Outcome Summary: Patient OOB w/ 1x assist. Minimal c/o pain during shift. Patient straight cathed once overnight, DTV @ 07:30, denies discomfort. Patient wishes to be DCed home once cleared.

## 2020-11-30 NOTE — PROGRESS NOTES
Hospital Medicine Service -  Daily Progress Note       SUBJECTIVE   Interval History: Left hip pain improving  Patient is having trouble with urination requiring straight cath     OBJECTIVE      Vital signs in last 24 hours:  Temp:  [36.2 °C (97.2 °F)-37.1 °C (98.7 °F)] 37.1 °C (98.7 °F)  Heart Rate:  [64-78] 70  Resp:  [12-18] 16  BP: (110-136)/(56-78) 117/78    Intake/Output Summary (Last 24 hours) at 11/30/2020 1256  Last data filed at 11/30/2020 1049  Gross per 24 hour   Intake 960 ml   Output 1700 ml   Net -740 ml       PHYSICAL EXAMINATION      Physical Exam   General appearance: alert, appears stated age, cooperative, non-toxic  Head: normocephalic, without obvious abnormality, atraumatic  Eyes: conjunctivae clear. PERRL, EOMI's intact.  Lungs: clear to auscultation bilaterally   Heart: regular rate and rhythm, S1, S2 normal,   Abdomen: Nondistended, +BS, soft, non-tender, no masses palpable   Extremities: left hip dressing clean  Pulses: 2+ and symmetric B/L DP  Neurologic: Alert and oriented X 3, no focal deficits  Skin: intact, no rashes or lesions       LINES, CATHETERS, DRAINS, AIRWAYS, AND WOUNDS   Lines, Drains, Airways, Wounds:  Peripheral IV 11/28/20 Left Antecubital (Active)   Number of days: 2       Surgical Incision Hip Left (Active)   Number of days: 1       Comments:      LABS / IMAGING / TELE      Labs  Lab Results   Component Value Date    WBC 7.03 11/30/2020    HGB 11.3 (L) 11/30/2020    HCT 34.0 (L) 11/30/2020     (L) 11/30/2020    CHOL 131 07/17/2020    TRIG 45 07/17/2020    HDL 55 07/17/2020    ALT 15 (L) 11/30/2020    AST 24 11/30/2020     (L) 11/30/2020    K 5.2 (H) 11/30/2020     11/30/2020    CREATININE 1.5 (H) 11/30/2020    BUN 33 (H) 11/30/2020    CO2 22 11/30/2020    TSH 1.61 11/13/2018    PSA 2.02 08/26/2015    INR 1.1 11/28/2020    HGBA1C 4.8 08/26/2015       Imaging  I have independently reviewed the pertinent imaging from the last 24  hrs.    ECG/Telemetry  Patient is not on telemetry.    ASSESSMENT AND PLAN      Hip fracture (CMS/[facility])  Assessment & Plan  Left femoral neck fracture s/p mechanical fall  S/p Open reduction internal fixation of left valgus impacted femoral neck fracture POD 1  Prn pain control, DVT proph  Encouraged incentive spirometry  PT/OT per ortho recs  After discussing with the patient - he is started on eliquis 2.5 mg po bid for 4 wks for dvt prophylaxis as he had history of gi bleed with asa  Will rpt cbc in am    Urinary retention  Assessment & Plan  Required straight cath for urinary retention  Start bowel regimen as he is constipated  Consult Urology- Known to Dr Taran Rincon  Continue Proscar    Left elbow fracture  Assessment & Plan  Possible fracture on admission imaging.   Repeat Xrays -Small ossific density located along the anterior lateral cortex of   the radial head of uncertain significance.  The possibility of a small avulsion   type injury is not entirely excluded and clinical correlation is advised.  There   is no evidence of an elbow joint effusion.  Outpatient follow up with ortho    Acute kidney injury superimposed on CKD (CMS/[facility])  Assessment & Plan  DAVID on CKD stage III  Creatinine 2.0 on admission.  Patient reports having a solitary kidney. Cr improved to 1.5 today  Baseline creatinine around 1.6.  Encouraged hydration.  Avoid nephrotoxins.  Monitor BMP- rpt in am    Rib fracture  Assessment & Plan  Possible nondisplaced fracture through the anterolateral aspect of the right   seventh rib.  No displaced rib fractures are demonstrated and there is no   pneumothorax.     Pain control and encouraged incentive spirometry    Pure hypercholesterolemia  Assessment & Plan  Cont statin.    Iron deficiency anemia  Assessment & Plan  hgb appears to be at baseline- 11.3  Monitor-     Essential hypertension  Assessment & Plan  Continue amlodipine.  Hold lisinopril for now secondary to elevated creat  Monitor blood  pressures.     VTE Assessment: [place] VTE Score: 6  VTE Prophylaxis Plan: eliquis  Code Status: Full Code  Estimated Discharge Date: 12/1/2020  Disposition Planning: home tomorrow     Davidson Rich MD  11/30/2020

## 2020-11-30 NOTE — PROGRESS NOTES
Per [facility] pharmacy Eliquis co-pay 47 $. Patient aware. [facility] has 30 tablets in stock. Will order another 30 . Will be in pharmacy at 2 pm. Patient aware.

## 2020-11-30 NOTE — PROGRESS NOTES
[facility]  Received referral from Keily Quevedo RN  Pt aware of choice, chooses [facility]  Lives with his wife in a multistory house with a one floor set up  Will have a rolling walker at hospital discharge  Referral completed for PT with 12/01/2020 home care contact in case pt becomes stable for discharge home later today  Kitty Laureano RN pager [phone]

## 2020-11-30 NOTE — PROGRESS NOTES
Met with patient at bedside. Plans to return home with wife at [place] address. Agrees to [facility] physical therapy. Declines need for nursing. Patient is a physician. Referral given to Kitty Laureano RN. Confirmed pharmacy [facility] [address] [phone]. Faxed Eliquis prescription. Will call for co-pay amount. Wife will transport home when discharged.

## 2020-11-30 NOTE — NURSING NOTE
Patient was DTV @ 19:30 after ORIF of L hip. Patient was unable to do so but denied being uncomfortable. Bladder scanned for 508. Paged Dr. Lynch around 21:40 for orders, per Dr. Lynch, straight cath if > 400cc and uncomfortable. Patient requested more time. Repeat bladder scan around 23:15 showed >600 in bladder. Straight cathed @ 23:30 for 700cc. Will continue to monitor.
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
## 2020-11-30 NOTE — PLAN OF CARE
Problem: Adult Inpatient Plan of Care  Goal: Plan of Care Review  Outcome: Progressing  Flowsheets (Taken 11/30/2020 1028)  Outcome Summary:   11/30 Pt seen for follow up PT session   Patient tolerated session well   provided education on proper technique when performing txs   demonstrated proper body mechanics throughout session   he completed all functional transfers without assist and without difficulty   he ambulated with minimal gait deficits identified and pain controlled   pt provided educaation regarding importance of ambulation at home to promote his mobility and activity tolerance   no further PT needs warranted   anticipate DC home with assist when medically warranted

## 2020-11-30 NOTE — ASSESSMENT & PLAN NOTE
Required straight cath for urinary retention; now urinating w/o issue  - Urology following  - Continue Proscar and Flomax  - Bowel Regimen  - Outpatient Urology follow-up

## 2020-11-30 NOTE — PATIENT CARE CONFERENCE
Care Progression Rounds Note  Date: 11/30/2020  Time: 10:02 AM     Patient Name: Efrain Cardona Jr.     Medical Record Number: [id number]   YOB: 1934  Sex: Male      Room/Bed: 5412    Admitting Diagnosis: Hip fracture (CMS/[facility]) [S72.009A]  DAVID (acute kidney injury) (CMS/[facility]) [N17.9]  Closed fracture of left hip, initial encounter (CMS/[facility]) [S72.002A]  Fall, initial encounter [W19.XXXA]   Admit Date/Time: 11/28/2020  6:58 AM    Primary Diagnosis: Closed fracture of left hip (CMS/[facility])  Principal Problem: Closed fracture of left hip (CMS/[facility])    GMLOS: pending  Anticipated Discharge Date: 12/1/2020    AM-PAC  Mobility Score: 20    Discharge Planning:  Living Arrangements: house  Concerns to be Addressed: care coordination/care conferences, discharge planning concerns  Anticipated Discharge Disposition: home with assistance, home with home health services, inpatient rehabilitation facility/acute rehab    Barriers to Discharge:  Barriers to Discharge: Medical issues not resolved    Participants:  advanced practice provider, , nursing, social work/services, physical therapy

## 2020-12-01 VITALS
OXYGEN SATURATION: 96 % | BODY MASS INDEX: 24.92 KG/M2 | DIASTOLIC BLOOD PRESSURE: 65 MMHG | SYSTOLIC BLOOD PRESSURE: 116 MMHG | WEIGHT: 146 LBS | HEART RATE: 71 BPM | HEIGHT: 64 IN | RESPIRATION RATE: 18 BRPM | TEMPERATURE: 98.1 F

## 2020-12-01 PROBLEM — R93.89 ABNORMAL X-RAY: Status: ACTIVE | Noted: 2020-11-29

## 2020-12-01 LAB
ERYTHROCYTE [DISTWIDTH] IN BLOOD BY AUTOMATED COUNT: 14.7 % (ref 11.6–14.4)
HCT VFR BLDCO AUTO: 28 % (ref 40.1–51)
HCT VFR BLDCO AUTO: 30.2 % (ref 40.1–51)
HGB BLD-MCNC: 10.5 G/DL (ref 13.7–17.5)
HGB BLD-MCNC: 9.6 G/DL (ref 13.7–17.5)
MCH RBC QN AUTO: 34.2 PG (ref 28–33.2)
MCHC RBC AUTO-ENTMCNC: 34.3 G/DL (ref 32.2–36.5)
MCV RBC AUTO: 99.6 FL (ref 83–98)
PDW BLD AUTO: 10.7 FL (ref 9.4–12.4)
PLATELET # BLD AUTO: 135 K/UL (ref 150–350)
RBC # BLD AUTO: 2.81 M/UL (ref 4.5–5.8)
WBC # BLD AUTO: 5.03 K/UL (ref 3.8–10.5)

## 2020-12-01 PROCEDURE — 36415 COLL VENOUS BLD VENIPUNCTURE: CPT | Performed by: HOSPITALIST

## 2020-12-01 PROCEDURE — 85027 COMPLETE CBC AUTOMATED: CPT | Performed by: HOSPITALIST

## 2020-12-01 PROCEDURE — 63700000 HC SELF-ADMINISTRABLE DRUG: Performed by: HOSPITALIST

## 2020-12-01 PROCEDURE — 63700000 HC SELF-ADMINISTRABLE DRUG: Performed by: STUDENT IN AN ORGANIZED HEALTH CARE EDUCATION/TRAINING PROGRAM

## 2020-12-01 PROCEDURE — 99232 SBSQ HOSP IP/OBS MODERATE 35: CPT | Performed by: NURSE PRACTITIONER

## 2020-12-01 PROCEDURE — 85014 HEMATOCRIT: CPT | Performed by: PHYSICIAN ASSISTANT

## 2020-12-01 PROCEDURE — 99238 HOSP IP/OBS DSCHRG MGMT 30/<: CPT | Performed by: HOSPITALIST

## 2020-12-01 RX ORDER — TAMSULOSIN HYDROCHLORIDE 0.4 MG/1
0.4 CAPSULE ORAL DAILY
Qty: 30 CAPSULE | Refills: 0 | Status: SHIPPED | OUTPATIENT
Start: 2020-12-02 | End: 2021-02-05

## 2020-12-01 RX ORDER — ACETAMINOPHEN 325 MG/1
650 TABLET ORAL EVERY 6 HOURS PRN
Start: 2020-12-01 | End: 2020-12-31

## 2020-12-01 RX ADMIN — FINASTERIDE 5 MG: 5 TABLET, FILM COATED ORAL at 09:18

## 2020-12-01 RX ADMIN — TAMSULOSIN HYDROCHLORIDE 0.4 MG: 0.4 CAPSULE ORAL at 09:18

## 2020-12-01 RX ADMIN — BISACODYL 10 MG: 10 SUPPOSITORY RECTAL at 11:41

## 2020-12-01 RX ADMIN — APIXABAN 2.5 MG: 2.5 TABLET, FILM COATED ORAL at 09:18

## 2020-12-01 RX ADMIN — POLYETHYLENE GLYCOL 3350 17 G: 17 POWDER, FOR SOLUTION ORAL at 09:19

## 2020-12-01 RX ADMIN — ATORVASTATIN CALCIUM 20 MG: 20 TABLET, FILM COATED ORAL at 09:18

## 2020-12-01 RX ADMIN — AMLODIPINE BESYLATE 5 MG: 5 TABLET ORAL at 09:18

## 2020-12-01 NOTE — PROGRESS NOTES
Palliative Care Progress Note    Patient Name: Efrain Cardona Jr.  Patient MRN: [id number]  Date / Time: 12/1/2020 10:55 AM   Attending Physician: Davidson Rich MD    This is Hospital Day: 4    Interval History (Subjective)    Source: chart review and the patient    Patient is pod #2 L percutaneous fixation of femoral neck fracture. He denies pain at rest, mild pain with activity. Has only required Tylenol post operatively. Feeling constipated. Post op urinary retention requiring straight cath. States that he is now voiding without difficulty. He is anxious for discharge today.     ROS:  Completed, as per interval history. Remainder is negative.    Current Medications:  •  acetaminophen, 650 mg, oral, q6h BRENDAN  •  amLODIPine, 5 mg, oral, Daily  •  apixaban, 2.5 mg, oral, BID  •  atorvastatin, 20 mg, oral, Daily  •  bisacodyL, 10 mg, rectal, Daily PRN  •  ceFAZolin, 2 g, intravenous, 60 min Pre-Op  •  glucose, 16-32 g of dextrose, oral, PRN **OR** dextrose, 15-30 g of dextrose, oral, PRN **OR** glucagon, 1 mg, intramuscular, PRN **OR** dextrose in water, 25 mL, intravenous, PRN  •  [DISCONTINUED] glucose, 16-32 g of dextrose, oral, PRN **OR** [DISCONTINUED] dextrose, 15-30 g of dextrose, oral, PRN **OR** [DISCONTINUED] glucagon, 1 mg, intramuscular, PRN **OR** dextrose in water, 25 mL, intravenous, PRN  •  fentaNYL, 50 mcg, intravenous, q15 min PRN  •  finasteride, 5 mg, oral, Daily  •  HYDROmorphone, 0.5 mg, intravenous, q15 min PRN  •  ondansetron, 4 mg, intravenous, q15 min PRN  •  oxyCODONE, 5 mg, oral, q4h PRN  •  polyethylene glycol, 17 g, oral, Daily  •  senna, 2 tablet, oral, Nightly  •  tamsulosin, 0.4 mg, oral, Daily  •  acetaminophen  •  apixaban  •  [START ON 12/2/2020] tamsulosin    Conversation/Goals of Care:  Restorative      Palliative Assessment     FAST Score: Not Relevant  Current Palliative Performance Status: 40%  Baseline Palliative Performance Status: 90%   Preferred Setting for  Care: home with home care  Estimated Palliative Prognosis: year +  Patient Status: Disease state: Controlled with current treatments.  Functional status: In bed > 50% of day  Mental status: alert and oriented.      Objective    Physical Exam:  General: NAD  HEENT: NC/AT, MM moist, EOMI  CV: Rate normal  Resp: RR normal  GI: soft, NT, nondistended  Neuro: AAO x 3  Psych: appropriate, cooperative  Ext: no edema        Vitals:  Vitals:    12/01/20 0914   BP:    Pulse:    Resp:    Temp:    SpO2: 96%       Intake & Output:  I/O last 3 completed shifts:  In: 240 [P.O.:240]  Out: 4360 [Urine:4360]    Laboratory Studies:    CBC Results       12/01/20 12/01/20 11/30/20                    0951 0432 0453         WBC -- 5.03 7.03         RBC -- 2.81 3.36         HGB 10.5 9.6 11.3         HCT 30.2 28.0 34.0         MCV -- 99.6 101.2         MCH -- 34.2 33.6         MCHC -- 34.3 33.2         PLT -- 135 133          CMP Results       11/30/20 11/29/20 11/28/20                    0454 0513 0828          137 136         K 5.2 5.0 4.9         Cl 103 107 106         CO2 22 24 23         Glucose 135 108 121         BUN 33 34 42         Creatinine 1.5 1.5 2.0         Calcium 8.3 8.7 9.2         Anion Gap 10 6 7         AST 24 -- --         ALT 15 -- --         Albumin 3.5 -- --         EGFR 44.5 44.5 31.9          Labs personally reviewed    Imaging and Other Studies:     Radiology Imaging   XR HIP WITH PELVIS 2-3 VW LEFT    Narrative CLINICAL HISTORY:  Fracture, hip    COMPARISON:  Radiographs of the pelvis and left of femur 11/28/2020    TECHNIQUE:  AP pelvis.  AP and two crosstable lateral radiographs left hip.      Impression FINDINGS/ IMPRESSION:  Interval placement of three partially threaded screws proximal left femur, tip  terminating in the femoral head.  Hardware is intact.  No change in alignment of  the slightly impacted left femoral neck fracture.  No new fracture.                                                                                      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 He requests to remain FULL CODE, full restorative care.     Debility  Assessment & Plan  Unstable. 85 y/o male with h/o CKD stage III, nephrectomy, baseline Cr 1.2, HTN, HLD, heart murmur who was fully independent in all ADLs and IADLs prior to admission. Patient states he lives at home with his wife (second wife). He is a retired physician. His goal is to return home with home care/PT at d/c.     Baseline PPS: 100%  Current PPS: 40%    Plan:  -PT/OT  -home with home care    Left hip pain  Assessment & Plan  Stable. Admitted with left femoral neck fracture s/p percutaneous fixation pod#2. Patient denies pain at rest, very mild pain with activity. Has only required Tylenol post operatively.     Plan:  -For multimodal pain management  -continue acetaminophen 650mg po q 6 hours x 5 days. Change to PRN upon discharge.  -oxycodone 5mg po q 4 hours prn severe pain  -avoid morphine due to impaired renal function            The total time spent with the patient today was  30 minutes.  >50% of total minutes were spent reviewing medical record, in counseling and/or coordination of care.        AELXA Cutler  Office [phone]

## 2020-12-01 NOTE — DISCHARGE SUMMARY
Hospital Medicine Service -  Inpatient Discharge Summary        BRIEF OVERVIEW   Admitting Provider: Alan Marie MD  Attending Provider: Davidson Rich MD Attending phys phone: [phone]    PCP: Alex Elliott [phone]    Admission Date: 11/28/2020  Discharge Date: 12/1/2020     DISCHARGE DIAGNOSES      Primary Discharge Diagnosis  Closed fracture of left hip (CMS/[facility])    Secondary Discharge Diagnoses  Active Hospital Problems    Diagnosis Date Noted   • Urinary retention 11/30/2020   • Abnormal x-ray 11/29/2020   • Hip fracture (CMS/[facility]) 11/28/2020   • Rib fracture 11/28/2020   • Acute kidney injury superimposed on CKD (CMS/[facility]) 11/28/2020   • Closed fracture of left hip (CMS/[facility]) 11/28/2020   • Left hip pain 11/28/2020   • Debility 11/28/2020   • Palliative care by specialist 11/28/2020   • Iron deficiency anemia 03/04/2019   • Essential hypertension 06/25/2018   • Pure hypercholesterolemia 01/03/2011      Resolved Hospital Problems   No resolved problems to display.       Problem List on Day of Discharge  Urinary retention  Assessment & Plan  Required straight cath for urinary retention; now urinating w/o issue  - Urology following  - Continue Proscar and Flomax  - Bowel Regimen  - Outpatient Urology follow-up    Abnormal x-ray  Assessment & Plan  Possible fracture on admission imaging.   Repeat Xrays -Small ossific density located along the anterior lateral cortex of the radial head of uncertain significance.  The possibility of a small avulsion type injury is not entirely excluded and clinical correlation is advised.  There is no evidence of an elbow joint effusion.  Denies elbow sx  - Ortho following; case discussed, low suspicion for fx no acute intervention recommended  - Outpatient follow up with ortho    Acute kidney injury superimposed on CKD (CMS/[facility])  Assessment & Plan  DAVID on CKD stage III; hx of solitary kidney; baseline Cr 1.6  Creatinine 2.0 > 1.5  - Encouraged  hydration  - Avoid nephrotoxins    Rib fracture  Assessment & Plan  Possible nondisplaced fracture through the anterolateral aspect of the right seventh rib.  No displaced rib fractures are demonstrated and there is no pneumothorax.   - Pain control and encouraged incentive spirometry    Hip fracture (CMS/[facility])  Assessment & Plan  Left femoral neck fracture s/p mechanical fall  S/p Open reduction internal fixation of left valgus impacted femoral neck fracture POD 2  - Ortho following  - Eliquis 2.5mg BID for 4 weeks for DVT ppx  - Pain control- using Tylenol PRN, does not want naroctic rx  - Encouraged incentive spirometry  - PT/OT per ortho recs    Pure hypercholesterolemia  Assessment & Plan  - Continue statin    Iron deficiency anemia  Assessment & Plan  Hgb 11.3 > 9.6 > 10.5  Drop ing hgb 2/2 acute expected blood loss from surgery  - Outpatient follow-up    Essential hypertension  Assessment & Plan  - Continue amlodipine.  - Hold lisinopril for now; follow-up w/ PCP for BP recheck and to determine if needs to be resumed      SUMMARY OF HOSPITALIZATION      Presenting Problem/History of Present Illness  Hip fracture (CMS/[facility])    This is a 86 y.o. year-old male admitted on 11/28/2020 with Hip fracture (CMS/[facility]) [S72.009A]  DAVID (acute kidney injury) (CMS/[facility]) [N17.9]  Closed fracture of left hip, initial encounter (CMS/[facility]) [S72.002A]  Fall, initial encounter [W19.XXXA].    Hospital Course  Efrain Cardona Jr. is a 86 y.o. male with a past medical history of CKD stage III, history of heart murmur, hyperlipidemia, hypertension who was admitted after a fall. Patient sustained a L hip fracture (inpacted femoral neck fracture) and a possible nondisplaced R 7th rib fracture. Patient was admitted for surgical intervention of his L hip fracture. Orthopedics was consulted and patient underwent L ORIF with Dr. Waggoner on 11/29/2020. Patient tolerated the procedure well. Due to a history of GIB he is on Eliquis 2.5mg BID for  "4 weeks for DVTppx. He is utilizing tylenol as needed for pain and declines narcotic rx for pain control on dc. He has been cleared by PT for dc home and home care has been set-up. Post-operatively patient had urinary retention. He required several straight catheterizations. He was seen by Urology and Flomax was added to Proscar. He also was treated with aggressive bowel regimen as constipation was also adding to urinary retention. Patient has now been voiding on his own without difficulty. He will need outpatient Urology follow-up.   Xray of the L elbow noted \"Small ossific density located along the anterior lateral cortex of  the radial head of uncertain significance.  The possibility of a small avulsion type injury is not entirely excluded and clinical correlation is advised.  There is no evidence of an elbow joint effusion\". Ortho following- case discussed, low suspicion for fracture and they do not recommend any acute intervention.   Patient did have slightly elevated Cr on admission from baseline, this has resolved and Cr is at baseline. Lisinopril has been held, and as blood pressures are stable will continue to be held on dc. Recommend outpatient PCP follow-up with BP recheck to see if it needs to be resumed.   Patient is medically cleared and stable for dc.     Exam on Day of Discharge  Physical Exam  Vitals signs and nursing note reviewed.   Constitutional:       General: He is not in acute distress.     Appearance: Normal appearance. He is not toxic-appearing.   HENT:      Head: Normocephalic and atraumatic.   Eyes:      Conjunctiva/sclera: Conjunctivae normal.   Neck:      Musculoskeletal: Neck supple.   Cardiovascular:      Rate and Rhythm: Normal rate and regular rhythm.      Heart sounds: Normal heart sounds.   Pulmonary:      Effort: Pulmonary effort is normal. No respiratory distress.      Breath sounds: Normal breath sounds.   Abdominal:      General: Bowel sounds are normal.      Palpations: " Abdomen is soft.      Tenderness: There is no abdominal tenderness.   Musculoskeletal:      Comments: L Hip: dressing C/D/I, neurovascularly intact distally   Skin:     General: Skin is warm and dry.   Neurological:      General: No focal deficit present.      Mental Status: He is oriented to person, place, and time.   Psychiatric:         Mood and Affect: Mood normal.         Behavior: Behavior normal.         Consults During Admission  IP CONSULT TO ORTHOPEDIC SURGERY  IP CONSULT TO PAIN/PALLIATIVE CARE  IP CONSULT TO CASE MANAGEMENT  IP CONSULT TO UROLOGY    DISCHARGE MEDICATIONS        Medication List      START taking these medications    acetaminophen 325 mg tablet  Commonly known as: TYLENOL  Take 2 tablets (650 mg total) by mouth every 6 (six) hours as needed for mild pain or moderate pain.  Dose: 650 mg     apixaban 2.5 mg tablet  Commonly known as: ELIQUIS  Take 1 tablet (2.5 mg total) by mouth 2 (two) times a day for 57 doses Indications: deep vein thrombosis prevention in hip surgery.  Dose: 2.5 mg     tamsulosin 0.4 mg capsule  Commonly known as: FLOMAX  Start taking on: December 2, 2020  Take 1 capsule (0.4 mg total) by mouth daily.  Dose: 0.4 mg        CONTINUE taking these medications    amLODIPine 5 mg tablet  Commonly known as: NORVASC  Take 5 mg by mouth daily. Patient alternates 10 mg one day and 5 mg every other day  Dose: 5 mg     diphenoxylate-atropine 2.5-0.025 mg per tablet  Commonly known as: LomotiL  Take 1 tablet by mouth daily as needed for diarrhea.  Dose: 1 tablet     METAMUCIL ORAL  Take 2 capsules by mouth daily. Pt is unaware of dosage  Dose: 2 capsule     PriLOSEC 20 mg capsule  Take 20 mg by mouth as needed. 1 capsule daily 30 minutes to 1 hour before a meal Has not taken in last month  Dose: 20 mg  Generic drug: omeprazole     PROSCAR 5 mg tablet  Take 5 mg by mouth daily.  Dose: 5 mg  Generic drug: finasteride     simvastatin 40 mg tablet  Commonly known as: ZOCOR  Take 1 tablet  by mouth daily.  Dose: 1 tablet        STOP taking these medications    lisinopriL 40 mg tablet  Commonly known as: PRINIVIL            Instructions for after discharge     Call provider for:  difficulty breathing, headache or visual disturbances      Call provider for:  extreme fatigue      Call provider for:  persistent dizziness or light-headedness      Call provider for:  persistent nausea or vomiting      Call provider for:  rash      Call provider for:  redness, tenderness, or signs of infection (pain, swelling, redness, odor or green/yellow discharge around incision site)      Call provider for:  severe uncontrolled pain      Call provider for:  temperature >100.4      Discharge diet      Diet Type / Texture: Regular    Fluid Consistency: Thin Liquids    Follow-up with primary physician (PCP)      Please follow-up with your PCP within 1 week for re-evaluation and for repeat blood pressure check; you can discuss resuming Lisinopril at that time    Follow-up with provider      Please follow-up with Orthopedics in 2 weeks for re-evaluation    Efrain Waggoner MD   [phone]    [address]  [address]  [place] [place] [address]       Post-Discharge Activity: Normal activity as tolerated.      Normal activity as tolerated.    Walker - Type: Adult; Wheels: Standard      Type: Adult    Wheels: Standard    The face to face evaluation was performed on: 11/30/2020             PROCEDURES / LABS / IMAGING      Operative Procedures  Open reduction internal fixation of left valgus impacted femoral neck fracture on 11/29/2020    Other Procedures  None    Pertinent Labs  Results from last 7 days   Lab Units 11/30/20  0454 11/29/20  0513 11/28/20  0828   SODIUM mEQ/L 135* 137 136   POTASSIUM mEQ/L 5.2* 5.0 4.9   CHLORIDE mEQ/L 103 107 106   CO2 mEQ/L 22 24 23   BUN mg/dL 33* 34* 42*   CREATININE mg/dL 1.5* 1.5* 2.0*   CALCIUM mg/dL 8.3* 8.7* 9.2   ALBUMIN g/dL 3.5  --   --    BILIRUBIN TOTAL mg/dL 0.4  --   --    ALK  PHOS IU/L 48  --   --    ALT IU/L 15*  --   --    AST IU/L 24  --   --    GLUCOSE mg/dL 135* 108* 121*     Results from last 7 days   Lab Units 12/01/20  0951 12/01/20  0432 11/30/20  0453 11/29/20  0513   WBC K/uL  --  5.03 7.03 6.28   HEMOGLOBIN g/dL 10.5* 9.6* 11.3* 12.1*   HEMATOCRIT % 30.2* 28.0* 34.0* 36.3*   PLATELETS K/uL  --  135* 133* 123*     Microbiology Results     Procedure Component Value Units Date/Time    SARS-CoV-2 (COVID-19), PCR Nasopharynx [[id number]]  (Normal) Collected: 11/28/20 0841    Specimen: Nasopharyngeal Swab from Nasopharynx Updated: 11/28/20 0929    Narrative:      The following orders were created for panel order SARS-CoV-2 (COVID-19), PCR Nasopharynx.  Procedure                               Abnormality         Status                     ---------                               -----------         ------                     SARS-CoV-2 (COVID-19), P...[[id number]]  Normal              Final result                 Please view results for these tests on the individual orders.    SARS-CoV-2 (COVID-19), PCR Nasopharynx [[id number]]  (Normal) Collected: 11/28/20 0841    Specimen: Nasopharyngeal Swab from Nasopharynx Updated: 11/28/20 0929     SARS-CoV-2 (COVID-19) Negative        Above labs have been personally reviewed.     Pertinent Imaging  X-ray Ribs Right With Pa Chest    Result Date: 11/28/2020  IMPRESSION: 1.  Possible nondisplaced fracture through the anterolateral aspect of the right seventh rib.  No displaced rib fractures are demonstrated and there is no pneumothorax. 2.  Stable large hiatal hernia.    X-ray Elbow Left 2 Views    Result Date: 11/29/2020  IMPRESSION:Small ossific density located along the anterior lateral cortex of the radial head of uncertain significance.  The possibility of a small avulsion type injury is not entirely excluded and clinical correlation is advised.  There is no evidence of an elbow joint effusion.    X-ray Elbow Left 2 Views    Result Date:  11/28/2020  IMPRESSION:Somewhat limited exam due to suboptimal positioning.  Findings suspicious for a possible cortical avulsion type fracture from the lateral aspect of the radial head.  There is similar linear calcific breast density along the proximal cortex of the ulna which is also suspicious for possible avulsion type fracture.  Clinical correlation is advised.    X-ray Pelvis 1 Or 2 Views    Result Date: 11/28/2020  IMPRESSION: Left femoral neck fracture.  Findings discussed with Dr. Murillo at 8:30 AM on 11/20/2020.    X-ray Hip With Or Without Pelvis 2-3 Vw Left    Result Date: 11/30/2020  FINDINGS/ IMPRESSION: Interval placement of three partially threaded screws proximal left femur, tip terminating in the femoral head.  Hardware is intact.  No change in alignment of the slightly impacted left femoral neck fracture.  No new fracture.     X-ray Hip With Or Without Pelvis 2-3 Vw Left    Result Date: 11/29/2020  IMPRESSION: Left hip fracture in near anatomic alignment status post ORIF. Total fluoroscopy time: 59.3 seconds Total fluoroscopic images: 2 Cumulative dose: 7.16 mGy    X-ray Femur Left 2+ View    Result Date: 11/28/2020  IMPRESSION: Left femoral neck fracture.  Findings discussed with Dr. Murillo at 8:30 AM on 11/20/2020.    Fl Fluoroscopy Technical Assistance    Result Date: 11/29/2020  IMPRESSION: Left hip fracture in near anatomic alignment status post ORIF. Total fluoroscopy time: 59.3 seconds Total fluoroscopic images: 2 Cumulative dose: 7.16 mGy      OUTPATIENT  FOLLOW-UP / REFERRALS / PENDING TESTS        Outpatient Follow-Up Appointments            In 2 months Timoteo Kim MD [facility] in [place]    In 3 months Alex Elliott MD [facility] in [place]          Referrals  No orders of the defined types were placed in this encounter.      Test Results Pending at Discharge  Unresulted Labs (From admission, onward)    None          Important  Issues to Address in Follow-Up  DC home  Outpatient follow-up with PCP and Ortho  Outpatient blood pressure recheck  Eliquis 2.5mg BID for 4 weeks for DVT ppx.     DISCHARGE DISPOSITION      Disposition: Home     Code Status At Discharge: Full Code    Physician Order for Life-Sustaining Treatment Document Status      No documents found

## 2020-12-01 NOTE — PATIENT CARE CONFERENCE
Care Progression Rounds Note  Date: 12/1/2020  Time: 10:16 AM     Patient Name: Efrain Cardona Jr.     Medical Record Number: [id number]   YOB: 1934  Sex: Male      Room/Bed: 5412    Admitting Diagnosis: Hip fracture (CMS/[facility]) [S72.009A]  DAVID (acute kidney injury) (CMS/[facility]) [N17.9]  Closed fracture of left hip, initial encounter (CMS/[facility]) [S72.002A]  Fall, initial encounter [W19.XXXA]   Admit Date/Time: 11/28/2020  6:58 AM    Primary Diagnosis: Closed fracture of left hip (CMS/[facility])  Principal Problem: Closed fracture of left hip (CMS/[facility])    GMLOS: 4.3  Anticipated Discharge Date: 12/1/2020    AM-PAC  Mobility Score: 24    Discharge Planning:  Living Arrangements: house  Concerns to be Addressed: care coordination/care conferences, discharge planning concerns  Anticipated Discharge Disposition: home with assistance, home with home health services, inpatient rehabilitation facility/acute rehab    Barriers to Discharge:  Barriers to Discharge: Medical issues not resolved    Participants:  advanced practice provider, , nursing, physical therapy, social work/services
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
## 2020-12-01 NOTE — PROGRESS NOTES
[facility]  Received referral as 11/30/2020 note  Referral updated for PT with 12/02/2020 home care contact (pt declines home SN)

## 2020-12-01 NOTE — DISCHARGE INSTRUCTIONS
DVT Prophylaxis: Eliquis 2.5mg twice a day for 4 weeks    Follow up   Please follow up with Dr. Waggoner from Orthopedics in 2 weeks. Please call the office at [phone] with any questions and to schedule your follow up appointment.     Incision Care  -Please remove your surgical dressing on 12/4/2020. At that time if the wound is dry and not draining, you can leave it uncovered, open to air. If there is drainage, please place 4x4s covered by paper tape over your incision.  -Please do not submerge incision in water, no baths, no swimming, no pools, no hot tubs, etc.   -Please keep incision clean and dry. Once your dressing has been removed, do not scrub the incision in the shower and pat dry with a clean towel not used to dry your body.   -Please make sure your incision(s) are checked for signs and symptoms of infection: If any of the below should occur, please call the office:  -drainage from incision site, opening of incision, increased redness and/or tenderness, fevers greater than 101, flu-like symptoms.  -Please call office or go to ED with any thigh/calf pain or swelling in legs, chest pain, chest congestion, problems with breathing.     Constipation/Pain Medication:  -Take your pain medication with food. Do not drive while taking pain medication.   -Pain medications may cause constipation.   -If you have not had a bowel movement in 3 days please call the office  - Please take Senna-Colace as prescribed. Hold for loose stool. If you are having difficulties having a bowel   movement or haven't had bowel movement in 2 days, please add over the counter miralax and take as directed on package.   -Drink plenty of fluids and eat fresh fruits and vegetables.  -DO NOT SMOKE! Smoking is not healthy for your healing process.     **DO NOT take Tylenol (Acetaminophen) or medications containing Tylenol while taking Vicodin or Percocet  **DO NOT exceed more than 10 tablets of Vicodin or Percocet in a 24 hour period

## 2020-12-01 NOTE — PROGRESS NOTES
Orthopaedics Progress Note    [S]   Patient comfortable in bed. Up and out of bed yesterday with therapy. No issues.    [O]   Vitals:    11/30/20 2231   BP: 107/62   Pulse: 78   Resp: 18   Temp: 36.9 °C (98.4 °F)   SpO2: 92%       Physical Exam:  SILT  +DP/PT  +EHL/FHL/PF/DF  Dressing C/d/i  Quad fires      [A/P]  86 y.o. male status post left percutaneous fixation of femoral neck fracture, 2 Days Post-Op, doing well    -DVT prophylaxis -- Eliquis  -Weight bearing as tolerated   -Analgesia  -Physical therapy    Rafael Coleman Jr, MD

## 2020-12-01 NOTE — PLAN OF CARE
Plan of Care Review  Plan of Care Reviewed With: patient  RN reviewed discharge instructions with patient.

## 2020-12-02 ENCOUNTER — PATIENT OUTREACH (OUTPATIENT)
Dept: CASE MANAGEMENT | Facility: CLINIC | Age: 85
End: 2020-12-02

## 2020-12-02 PROCEDURE — G0180 MD CERTIFICATION HHA PATIENT: HCPCS | Performed by: INTERNAL MEDICINE
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
## 2020-12-08 ENCOUNTER — APPOINTMENT (OUTPATIENT)
Dept: LAB | Facility: CLINIC | Age: 85
End: 2020-12-08
Attending: INTERNAL MEDICINE
Payer: MEDICARE

## 2020-12-08 DIAGNOSIS — I10 ESSENTIAL HYPERTENSION: Chronic | ICD-10-CM

## 2020-12-08 LAB
ANION GAP SERPL CALC-SCNC: 11 MEQ/L (ref 3–15)
BUN SERPL-MCNC: 23 MG/DL (ref 8–20)
CALCIUM SERPL-MCNC: 9.2 MG/DL (ref 8.9–10.3)
CHLORIDE SERPL-SCNC: 106 MEQ/L (ref 98–109)
CO2 SERPL-SCNC: 21 MEQ/L (ref 22–32)
CREAT SERPL-MCNC: 1.3 MG/DL (ref 0.8–1.3)
GFR SERPL CREATININE-BSD FRML MDRD: 52.5 ML/MIN/1.73M*2
GLUCOSE SERPL-MCNC: 94 MG/DL (ref 70–99)
POTASSIUM SERPL-SCNC: 4.6 MEQ/L (ref 3.6–5.1)
SODIUM SERPL-SCNC: 138 MEQ/L (ref 136–144)

## 2020-12-08 PROCEDURE — 36415 COLL VENOUS BLD VENIPUNCTURE: CPT

## 2020-12-08 PROCEDURE — 80048 BASIC METABOLIC PNL TOTAL CA: CPT

## 2020-12-09 ENCOUNTER — TELEPHONE (OUTPATIENT)
Dept: PRIMARY CARE | Facility: CLINIC | Age: 85
End: 2020-12-09

## 2020-12-09 DIAGNOSIS — D64.9 ANEMIA, UNSPECIFIED TYPE: Primary | ICD-10-CM

## 2020-12-09 DIAGNOSIS — Z87.19 H/O: GI BLEED: ICD-10-CM

## 2020-12-09 NOTE — TELEPHONE ENCOUNTER
recent fall- hosp 11/28/20 cbc abnomal- and per pt h/o GI bleed- received the ok to order cbc which will be obtained today at [facility] lab

## 2020-12-09 NOTE — TELEPHONE ENCOUNTER
Pt was recently d'c from [facility] and was told to have a CBC performed(due to blood loss).  Pt would Dr Elliott to order b/w and call him once order is place

## 2020-12-10 ENCOUNTER — APPOINTMENT (OUTPATIENT)
Dept: LAB | Facility: CLINIC | Age: 85
End: 2020-12-10
Attending: INTERNAL MEDICINE
Payer: MEDICARE

## 2020-12-10 DIAGNOSIS — Z87.19 H/O: GI BLEED: ICD-10-CM

## 2020-12-10 DIAGNOSIS — D64.9 ANEMIA, UNSPECIFIED TYPE: ICD-10-CM

## 2020-12-10 LAB
BASOPHILS # BLD: 0.06 K/UL (ref 0.01–0.1)
BASOPHILS NFR BLD: 0.9 %
DIFFERENTIAL METHOD BLD: ABNORMAL
EOSINOPHIL # BLD: 0.16 K/UL (ref 0.04–0.54)
EOSINOPHIL NFR BLD: 2.4 %
ERYTHROCYTE [DISTWIDTH] IN BLOOD BY AUTOMATED COUNT: 16.3 % (ref 11.6–14.4)
HCT VFR BLDCO AUTO: 36.3 % (ref 40.1–51)
HGB BLD-MCNC: 11.8 G/DL (ref 13.7–17.5)
IMM GRANULOCYTES # BLD AUTO: 0.03 K/UL (ref 0–0.08)
IMM GRANULOCYTES NFR BLD AUTO: 0.4 %
LYMPHOCYTES # BLD: 2.05 K/UL (ref 1.2–3.5)
LYMPHOCYTES NFR BLD: 30.6 %
MCH RBC QN AUTO: 34.2 PG (ref 28–33.2)
MCHC RBC AUTO-ENTMCNC: 32.5 G/DL (ref 32.2–36.5)
MCV RBC AUTO: 105.2 FL (ref 83–98)
MONOCYTES # BLD: 0.57 K/UL (ref 0.3–1)
MONOCYTES NFR BLD: 8.5 %
NEUTROPHILS # BLD: 3.82 K/UL (ref 1.7–7)
NEUTS SEG NFR BLD: 57.2 %
NRBC BLD-RTO: 0 %
PDW BLD AUTO: 9.8 FL (ref 9.4–12.4)
PLATELET # BLD AUTO: 267 K/UL (ref 150–350)
RBC # BLD AUTO: 3.45 M/UL (ref 4.5–5.8)
WBC # BLD AUTO: 6.69 K/UL (ref 3.8–10.5)

## 2020-12-10 PROCEDURE — 36415 COLL VENOUS BLD VENIPUNCTURE: CPT

## 2020-12-10 PROCEDURE — 85025 COMPLETE CBC W/AUTO DIFF WBC: CPT

## 2020-12-16 ENCOUNTER — TELEPHONE (OUTPATIENT)
Dept: PRIMARY CARE | Facility: CLINIC | Age: 85
End: 2020-12-16

## 2020-12-16 NOTE — TELEPHONE ENCOUNTER
----- Message from Alex Elliott MD sent at 12/14/2020  2:41 PM EST -----  I believe patient checks this regularly in my chart but his hemoglobin is resolved almost back to normal following his hospitalization with acute blood loss.  Hip fracture.  Please give him a call and confirm that areas all the results.  Thanks

## 2021-02-02 PROBLEM — I35.1 NONRHEUMATIC AORTIC VALVE INSUFFICIENCY: Status: ACTIVE | Noted: 2021-02-02

## 2021-02-02 PROBLEM — I35.1 NONRHEUMATIC AORTIC VALVE INSUFFICIENCY: Chronic | Status: ACTIVE | Noted: 2021-02-02

## 2021-02-02 PROBLEM — I10 ESSENTIAL HYPERTENSION: Chronic | Status: RESOLVED | Noted: 2018-06-25 | Resolved: 2021-02-02

## 2021-02-05 ENCOUNTER — OFFICE VISIT (OUTPATIENT)
Dept: CARDIOLOGY | Facility: CLINIC | Age: 86
End: 2021-02-05
Payer: MEDICARE

## 2021-02-05 VITALS
OXYGEN SATURATION: 97 % | BODY MASS INDEX: 24.75 KG/M2 | HEIGHT: 64 IN | HEART RATE: 80 BPM | SYSTOLIC BLOOD PRESSURE: 100 MMHG | DIASTOLIC BLOOD PRESSURE: 60 MMHG | WEIGHT: 145 LBS

## 2021-02-05 DIAGNOSIS — I35.1 NONRHEUMATIC AORTIC VALVE INSUFFICIENCY: Primary | Chronic | ICD-10-CM

## 2021-02-05 DIAGNOSIS — I10 BENIGN ESSENTIAL HYPERTENSION: Chronic | ICD-10-CM

## 2021-02-05 PROCEDURE — 99214 OFFICE O/P EST MOD 30 MIN: CPT | Performed by: INTERNAL MEDICINE

## 2021-02-05 RX ORDER — LISINOPRIL 40 MG/1
40 TABLET ORAL DAILY
COMMUNITY

## 2021-02-05 ASSESSMENT — ENCOUNTER SYMPTOMS
BRUISES/BLEEDS EASILY: 0
CHANGE IN BOWEL HABIT: 0
BLOATING: 0
IRREGULAR HEARTBEAT: 0
FEVER: 0
DIZZINESS: 0
FREQUENCY: 0
LOSS OF BALANCE: 0
SHORTNESS OF BREATH: 0
PSYCHIATRIC NEGATIVE: 1
HEADACHES: 0
WEIGHT GAIN: 0
DECREASED APPETITE: 0
ABDOMINAL PAIN: 0
HEARTBURN: 0
PND: 0
DYSURIA: 0
PALPITATIONS: 0
HOARSE VOICE: 0
DOUBLE VISION: 0
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
## 2021-03-08 ENCOUNTER — OFFICE VISIT (OUTPATIENT)
Dept: PRIMARY CARE | Facility: CLINIC | Age: 86
End: 2021-03-08
Payer: MEDICARE

## 2021-03-08 VITALS
SYSTOLIC BLOOD PRESSURE: 110 MMHG | TEMPERATURE: 98.4 F | HEIGHT: 64 IN | DIASTOLIC BLOOD PRESSURE: 64 MMHG | HEART RATE: 74 BPM | BODY MASS INDEX: 25.27 KG/M2 | OXYGEN SATURATION: 96 % | WEIGHT: 148 LBS

## 2021-03-08 DIAGNOSIS — I10 BENIGN ESSENTIAL HYPERTENSION: Primary | Chronic | ICD-10-CM

## 2021-03-08 DIAGNOSIS — E78.2 MIXED HYPERLIPIDEMIA: Chronic | ICD-10-CM

## 2021-03-08 DIAGNOSIS — M10.9 GOUT, UNSPECIFIED CAUSE, UNSPECIFIED CHRONICITY, UNSPECIFIED SITE: ICD-10-CM

## 2021-03-08 DIAGNOSIS — E83.42 HYPOMAGNESEMIA: ICD-10-CM

## 2021-03-08 DIAGNOSIS — M81.0 AGE RELATED OSTEOPOROSIS, UNSPECIFIED PATHOLOGICAL FRACTURE PRESENCE: ICD-10-CM

## 2021-03-08 DIAGNOSIS — N18.31 STAGE 3A CHRONIC KIDNEY DISEASE (CMS/HCC): Chronic | ICD-10-CM

## 2021-03-08 PROCEDURE — 99214 OFFICE O/P EST MOD 30 MIN: CPT | Performed by: INTERNAL MEDICINE

## 2021-03-08 ASSESSMENT — ENCOUNTER SYMPTOMS
CHILLS: 0
SHORTNESS OF BREATH: 0
COUGH: 0
PALPITATIONS: 0
ABDOMINAL PAIN: 0
HEMATURIA: 0
FEVER: 0

## 2021-03-08 NOTE — ASSESSMENT & PLAN NOTE
Patient is now off allopurinol with no recent gout attack in the last year.  Recommend check uric acid level with other labs in the next month as ordered at today's visit.      37 minutes on this date of service was spent by Dr. Elliott  performing the following activities: Previsit medical record review, review of records from recent hospitalization in December, diagnostic testing review, medical consult report review, obtaining history from patient, performing examination, entering orders, comprehensive medication reconciliation, and documentation of this visit.
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
## 2021-03-08 NOTE — ASSESSMENT & PLAN NOTE
Patient is due for lipid profile check.  Continue current dose of statin.  This is primary prevention but patient has done exceedingly well with that over the last more than 20 years.  Recommend continue current treatment and should his lipid numbers trend down will consider gradual dose reduction to simvastatin 20 mg.

## 2021-03-08 NOTE — ASSESSMENT & PLAN NOTE
Blood pressure is stable however he appears to be taking a higher than recommended dose of lisinopril.  He is taking 60 mg a day in divided doses.  Maximum dose we discussed is 40 mg daily.  Recommend he switch to 20 mg twice daily and continue Norvasc 5 mg daily alternating with 10 mg daily and follow blood pressure tracking at home.  He will call us should his systolic blood pressure be trending above 140.

## 2021-03-08 NOTE — ASSESSMENT & PLAN NOTE
Patient is status post nephrectomy for cancer.  Has a single kidney.  Creatinine baseline stable at about 1.3.  We will check this in the next month.  Labs ordered at today's visit.

## 2021-04-05 ENCOUNTER — APPOINTMENT (OUTPATIENT)
Dept: LAB | Facility: CLINIC | Age: 86
End: 2021-04-05
Attending: INTERNAL MEDICINE
Payer: MEDICARE

## 2021-04-05 DIAGNOSIS — M10.9 GOUT, UNSPECIFIED CAUSE, UNSPECIFIED CHRONICITY, UNSPECIFIED SITE: ICD-10-CM

## 2021-04-05 DIAGNOSIS — E83.42 HYPOMAGNESEMIA: ICD-10-CM

## 2021-04-05 DIAGNOSIS — I10 BENIGN ESSENTIAL HYPERTENSION: Chronic | ICD-10-CM

## 2021-04-05 DIAGNOSIS — E78.2 MIXED HYPERLIPIDEMIA: Chronic | ICD-10-CM

## 2021-04-05 LAB
ALBUMIN SERPL-MCNC: 4.4 G/DL (ref 3.4–5)
ALP SERPL-CCNC: 62 IU/L (ref 35–126)
ALT SERPL-CCNC: 23 IU/L (ref 16–63)
ANION GAP SERPL CALC-SCNC: 11 MEQ/L (ref 3–15)
AST SERPL-CCNC: 29 IU/L (ref 15–41)
BILIRUB SERPL-MCNC: 1.3 MG/DL (ref 0.3–1.2)
BUN SERPL-MCNC: 39 MG/DL (ref 8–20)
CALCIUM SERPL-MCNC: 9.3 MG/DL (ref 8.9–10.3)
CHLORIDE SERPL-SCNC: 104 MEQ/L (ref 98–109)
CHOLEST SERPL-MCNC: 178 MG/DL
CO2 SERPL-SCNC: 23 MEQ/L (ref 22–32)
CREAT SERPL-MCNC: 1.8 MG/DL (ref 0.8–1.3)
ERYTHROCYTE [DISTWIDTH] IN BLOOD BY AUTOMATED COUNT: 15.4 % (ref 11.6–14.4)
GFR SERPL CREATININE-BSD FRML MDRD: 36 ML/MIN/1.73M*2
GLUCOSE SERPL-MCNC: 102 MG/DL (ref 70–99)
HCT VFR BLDCO AUTO: 41.3 % (ref 40.1–51)
HDLC SERPL-MCNC: 80 MG/DL
HDLC SERPL: 2.2 {RATIO}
HGB BLD-MCNC: 13.4 G/DL (ref 13.7–17.5)
LDLC SERPL CALC-MCNC: 91 MG/DL
MAGNESIUM SERPL-MCNC: 2 MG/DL (ref 1.8–2.5)
MCH RBC QN AUTO: 32.9 PG (ref 28–33.2)
MCHC RBC AUTO-ENTMCNC: 32.4 G/DL (ref 32.2–36.5)
MCV RBC AUTO: 101.5 FL (ref 83–98)
NONHDLC SERPL-MCNC: 98 MG/DL
PDW BLD AUTO: 10.3 FL (ref 9.4–12.4)
PLATELET # BLD AUTO: 162 K/UL (ref 150–350)
POTASSIUM SERPL-SCNC: 4.6 MEQ/L (ref 3.6–5.1)
PROT SERPL-MCNC: 6.8 G/DL (ref 6–8.2)
RBC # BLD AUTO: 4.07 M/UL (ref 4.5–5.8)
SODIUM SERPL-SCNC: 138 MEQ/L (ref 136–144)
TRIGL SERPL-MCNC: 37 MG/DL (ref 30–149)
URATE SERPL-MCNC: 9.1 MG/DL (ref 4.8–8.2)
WBC # BLD AUTO: 5.22 K/UL (ref 3.8–10.5)

## 2021-04-05 PROCEDURE — 84550 ASSAY OF BLOOD/URIC ACID: CPT

## 2021-04-05 PROCEDURE — 85027 COMPLETE CBC AUTOMATED: CPT

## 2021-04-05 PROCEDURE — 80053 COMPREHEN METABOLIC PANEL: CPT

## 2021-04-05 PROCEDURE — 80061 LIPID PANEL: CPT

## 2021-04-05 PROCEDURE — 36415 COLL VENOUS BLD VENIPUNCTURE: CPT

## 2021-04-05 PROCEDURE — 83735 ASSAY OF MAGNESIUM: CPT

## 2021-04-08 ENCOUNTER — TELEPHONE (OUTPATIENT)
Dept: PRIMARY CARE | Facility: CLINIC | Age: 86
End: 2021-04-08

## 2021-04-08 NOTE — TELEPHONE ENCOUNTER
----- Message from Sumeet Marquez MA sent at 4/8/2021  9:50 AM EDT -----    ----- Message -----  From: Alex Elliott MD  Sent: 4/7/2021   4:43 PM EDT  To: Radha Miller MA    Please call patient regarding his labs.  His creatinine was up to 1.8.  Please ask about his hydration status he is been in that range before any may be is slightly dry.  Lipid numbers are good.  Uric acid is slightly elevated at 9.1 but he has not had recent gout so I do not think we need to change anything there for now.  Hemoglobin stable at 13.4.  Thanks

## 2021-04-14 DIAGNOSIS — Z23 ENCOUNTER FOR IMMUNIZATION: ICD-10-CM

## 2021-07-21 ENCOUNTER — APPOINTMENT (OUTPATIENT)
Dept: LAB | Facility: CLINIC | Age: 86
End: 2021-07-21
Attending: INTERNAL MEDICINE
Payer: MEDICARE

## 2021-07-21 ENCOUNTER — OFFICE VISIT (OUTPATIENT)
Dept: PRIMARY CARE | Facility: CLINIC | Age: 86
End: 2021-07-21
Payer: MEDICARE

## 2021-07-21 VITALS
SYSTOLIC BLOOD PRESSURE: 120 MMHG | TEMPERATURE: 96.9 F | OXYGEN SATURATION: 99 % | WEIGHT: 144.2 LBS | HEART RATE: 72 BPM | HEIGHT: 64 IN | BODY MASS INDEX: 24.62 KG/M2 | DIASTOLIC BLOOD PRESSURE: 70 MMHG

## 2021-07-21 DIAGNOSIS — N18.31 STAGE 3A CHRONIC KIDNEY DISEASE (CMS/HCC): Chronic | ICD-10-CM

## 2021-07-21 DIAGNOSIS — I10 BENIGN ESSENTIAL HYPERTENSION: Primary | Chronic | ICD-10-CM

## 2021-07-21 DIAGNOSIS — E83.42 HYPOMAGNESEMIA: ICD-10-CM

## 2021-07-21 DIAGNOSIS — E78.2 MIXED HYPERLIPIDEMIA: Chronic | ICD-10-CM

## 2021-07-21 DIAGNOSIS — K21.9 GASTROESOPHAGEAL REFLUX DISEASE WITHOUT ESOPHAGITIS: ICD-10-CM

## 2021-07-21 LAB
ALBUMIN SERPL-MCNC: 4.3 G/DL (ref 3.4–5)
ALP SERPL-CCNC: 63 IU/L (ref 35–126)
ALT SERPL-CCNC: 21 IU/L (ref 16–63)
ANION GAP SERPL CALC-SCNC: 10 MEQ/L (ref 3–15)
AST SERPL-CCNC: 26 IU/L (ref 15–41)
BILIRUB SERPL-MCNC: 0.9 MG/DL (ref 0.3–1.2)
BUN SERPL-MCNC: 30 MG/DL (ref 8–20)
CALCIUM SERPL-MCNC: 9.8 MG/DL (ref 8.9–10.3)
CHLORIDE SERPL-SCNC: 103 MEQ/L (ref 98–109)
CO2 SERPL-SCNC: 25 MEQ/L (ref 22–32)
CREAT SERPL-MCNC: 1.6 MG/DL (ref 0.8–1.3)
GFR SERPL CREATININE-BSD FRML MDRD: 41.2 ML/MIN/1.73M*2
GLUCOSE SERPL-MCNC: 100 MG/DL (ref 70–99)
MAGNESIUM SERPL-MCNC: 1.9 MG/DL (ref 1.8–2.5)
POTASSIUM SERPL-SCNC: 4.9 MEQ/L (ref 3.6–5.1)
PROT SERPL-MCNC: 6.6 G/DL (ref 6–8.2)
SODIUM SERPL-SCNC: 138 MEQ/L (ref 136–144)

## 2021-07-21 PROCEDURE — 80053 COMPREHEN METABOLIC PANEL: CPT

## 2021-07-21 PROCEDURE — 83735 ASSAY OF MAGNESIUM: CPT

## 2021-07-21 PROCEDURE — 36415 COLL VENOUS BLD VENIPUNCTURE: CPT

## 2021-07-21 PROCEDURE — 99214 OFFICE O/P EST MOD 30 MIN: CPT | Performed by: INTERNAL MEDICINE

## 2021-07-21 ASSESSMENT — ENCOUNTER SYMPTOMS
CHILLS: 0
ABDOMINAL PAIN: 0
COUGH: 0
FEVER: 0
PALPITATIONS: 0
SHORTNESS OF BREATH: 0
HEMATURIA: 0

## 2021-07-21 NOTE — ASSESSMENT & PLAN NOTE
As above, at last check of creatinine was up a bit at 1.8.  Previous baseline close to 1.4.  He may have been slightly dry at that time.  Due for recheck of CMP which was ordered at today's visit.

## 2021-07-21 NOTE — ASSESSMENT & PLAN NOTE
Blood pressure stable on reduced dose lisinopril.  Continue current treatment.  He is tolerating alternating day Norvasc therapy which helps to minimize edema.  Check CMP at today's visit.

## 2021-07-21 NOTE — ASSESSMENT & PLAN NOTE
No recent problem from hypomagnesemia.  Due for magnesium check with other labs ordered at today's visit.         34 minutes on this date of service was spent by Dr. Elliott  performing the following activities: Previsit medical record review, diagnostic testing review, medical consult report review, obtaining history from patient, performing examination, entering orders, comprehensive medication reconciliation, and documentation of this visit.
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
## 2021-08-02 ENCOUNTER — TELEPHONE (OUTPATIENT)
Dept: PRIMARY CARE | Facility: CLINIC | Age: 86
End: 2021-08-02

## 2021-08-02 NOTE — TELEPHONE ENCOUNTER
Called and spoke to pt - relayed Dr. PELAEZ reviewed lab results to pt - who replied all good new - pt expressed understanding.

## 2021-08-02 NOTE — TELEPHONE ENCOUNTER
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  Renal function stable with a creatinine of 1.6.  He probably did see this on the portal earlier.  Thanks so much

## 2021-11-17 ENCOUNTER — OFFICE VISIT (OUTPATIENT)
Dept: PRIMARY CARE | Facility: CLINIC | Age: 86
End: 2021-11-17
Payer: MEDICARE

## 2021-11-17 ENCOUNTER — APPOINTMENT (OUTPATIENT)
Dept: LAB | Facility: CLINIC | Age: 86
End: 2021-11-17
Attending: INTERNAL MEDICINE
Payer: MEDICARE

## 2021-11-17 VITALS
HEART RATE: 70 BPM | SYSTOLIC BLOOD PRESSURE: 112 MMHG | WEIGHT: 145 LBS | BODY MASS INDEX: 24.75 KG/M2 | DIASTOLIC BLOOD PRESSURE: 66 MMHG | TEMPERATURE: 97.5 F | HEIGHT: 64 IN | OXYGEN SATURATION: 99 %

## 2021-11-17 DIAGNOSIS — E78.2 MIXED HYPERLIPIDEMIA: Chronic | ICD-10-CM

## 2021-11-17 DIAGNOSIS — I10 BENIGN ESSENTIAL HYPERTENSION: Chronic | ICD-10-CM

## 2021-11-17 DIAGNOSIS — N40.0 BENIGN PROSTATIC HYPERPLASIA, UNSPECIFIED WHETHER LOWER URINARY TRACT SYMPTOMS PRESENT: ICD-10-CM

## 2021-11-17 DIAGNOSIS — I10 BENIGN ESSENTIAL HYPERTENSION: Primary | Chronic | ICD-10-CM

## 2021-11-17 DIAGNOSIS — N18.31 STAGE 3A CHRONIC KIDNEY DISEASE (CMS/HCC): Chronic | ICD-10-CM

## 2021-11-17 LAB
ALBUMIN SERPL-MCNC: 4.2 G/DL (ref 3.4–5)
ALP SERPL-CCNC: 56 IU/L (ref 35–126)
ALT SERPL-CCNC: 20 IU/L (ref 16–63)
ANION GAP SERPL CALC-SCNC: 12 MEQ/L (ref 3–15)
AST SERPL-CCNC: 24 IU/L (ref 15–41)
BILIRUB SERPL-MCNC: 1.1 MG/DL (ref 0.3–1.2)
BUN SERPL-MCNC: 22 MG/DL (ref 8–20)
CALCIUM SERPL-MCNC: 9.8 MG/DL (ref 8.9–10.3)
CHLORIDE SERPL-SCNC: 103 MEQ/L (ref 98–109)
CHOLEST SERPL-MCNC: 158 MG/DL
CO2 SERPL-SCNC: 24 MEQ/L (ref 22–32)
CREAT SERPL-MCNC: 1.6 MG/DL (ref 0.8–1.3)
ERYTHROCYTE [DISTWIDTH] IN BLOOD BY AUTOMATED COUNT: 15.1 % (ref 11.6–14.4)
GFR SERPL CREATININE-BSD FRML MDRD: 41.2 ML/MIN/1.73M*2
GLUCOSE SERPL-MCNC: 88 MG/DL (ref 70–99)
HCT VFR BLDCO AUTO: 41.2 % (ref 40.1–51)
HDLC SERPL-MCNC: 62 MG/DL
HDLC SERPL: 2.5 {RATIO}
HGB BLD-MCNC: 13.9 G/DL (ref 13.7–17.5)
LDLC SERPL CALC-MCNC: 87 MG/DL
MAGNESIUM SERPL-MCNC: 2 MG/DL (ref 1.8–2.5)
MCH RBC QN AUTO: 34.7 PG (ref 28–33.2)
MCHC RBC AUTO-ENTMCNC: 33.7 G/DL (ref 32.2–36.5)
MCV RBC AUTO: 102.7 FL (ref 83–98)
NONHDLC SERPL-MCNC: 96 MG/DL
PDW BLD AUTO: 10.3 FL (ref 9.4–12.4)
PLATELET # BLD AUTO: 169 K/UL (ref 150–350)
POTASSIUM SERPL-SCNC: 4.8 MEQ/L (ref 3.6–5.1)
PROT SERPL-MCNC: 6.7 G/DL (ref 6–8.2)
RBC # BLD AUTO: 4.01 M/UL (ref 4.5–5.8)
SODIUM SERPL-SCNC: 139 MEQ/L (ref 136–144)
TRIGL SERPL-MCNC: 47 MG/DL (ref 30–149)
WBC # BLD AUTO: 4.94 K/UL (ref 3.8–10.5)

## 2021-11-17 PROCEDURE — 85027 COMPLETE CBC AUTOMATED: CPT

## 2021-11-17 PROCEDURE — 80053 COMPREHEN METABOLIC PANEL: CPT

## 2021-11-17 PROCEDURE — 36415 COLL VENOUS BLD VENIPUNCTURE: CPT

## 2021-11-17 PROCEDURE — 99214 OFFICE O/P EST MOD 30 MIN: CPT | Performed by: INTERNAL MEDICINE

## 2021-11-17 PROCEDURE — 83735 ASSAY OF MAGNESIUM: CPT

## 2021-11-17 PROCEDURE — 80061 LIPID PANEL: CPT

## 2021-11-17 RX ORDER — SILDENAFIL CITRATE 20 MG/1
TABLET ORAL
COMMUNITY
Start: 2021-09-12

## 2021-11-17 ASSESSMENT — ENCOUNTER SYMPTOMS
PALPITATIONS: 0
CHILLS: 0
FEVER: 0
COUGH: 0
HEMATURIA: 0
SHORTNESS OF BREATH: 0
ABDOMINAL PAIN: 0
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
## 2021-11-17 NOTE — ASSESSMENT & PLAN NOTE
Blood pressure very well controlled on current regimen.  We discussed that in his age bracket maintaining systolic blood pressure under 120 is no longer considered target therapy.  I think anything under 140 is fine for him.  He has less edema as he has reduce his dose of amlodipine slightly.  Continue current treatment.

## 2021-11-17 NOTE — ASSESSMENT & PLAN NOTE
Cholesterol numbers have been excellent on current dose simvastatin.  Recommend continue current treatment.  Check lipid profile with other labs in the next month or so.        32   minutes on this date of service was spent by Dr. Elliott  performing the following activities: Previsit medical record review, diagnostic testing review, medical consult report review, obtaining history from patient, performing examination, entering orders, comprehensive medication reconciliation, and documentation of this visit.

## 2021-11-17 NOTE — ASSESSMENT & PLAN NOTE
Creatinine has been reasonably stable.  On 7/21/2021 it was reasonably stable at 1.6.  Cause is status post left nephrectomy in 22,006 for transitional cell carcinoma.

## 2021-11-22 ENCOUNTER — TELEPHONE (OUTPATIENT)
Dept: PRIMARY CARE | Facility: CLINIC | Age: 86
End: 2021-11-22

## 2021-11-22 NOTE — TELEPHONE ENCOUNTER
----- Message from Alex Elliott MD sent at 11/22/2021  1:46 PM EST -----  Please call Dr. Cardona of that his renal function is stable with BUN 22 creatinine 1.6, cholesterol numbers are good, and his hemoglobin is normal at 13.9.  Thanks

## 2022-03-21 ENCOUNTER — APPOINTMENT (OUTPATIENT)
Dept: LAB | Facility: CLINIC | Age: 87
End: 2022-03-21
Attending: INTERNAL MEDICINE
Payer: MEDICARE

## 2022-03-21 ENCOUNTER — OFFICE VISIT (OUTPATIENT)
Dept: PRIMARY CARE | Facility: CLINIC | Age: 87
End: 2022-03-21
Payer: MEDICARE

## 2022-03-21 VITALS
OXYGEN SATURATION: 96 % | HEART RATE: 68 BPM | HEIGHT: 64 IN | WEIGHT: 146 LBS | TEMPERATURE: 98.1 F | BODY MASS INDEX: 24.92 KG/M2 | DIASTOLIC BLOOD PRESSURE: 70 MMHG | SYSTOLIC BLOOD PRESSURE: 136 MMHG

## 2022-03-21 DIAGNOSIS — I10 BENIGN ESSENTIAL HYPERTENSION: Chronic | ICD-10-CM

## 2022-03-21 DIAGNOSIS — N18.31 STAGE 3A CHRONIC KIDNEY DISEASE (CMS/HCC): Chronic | ICD-10-CM

## 2022-03-21 DIAGNOSIS — K21.9 GASTROESOPHAGEAL REFLUX DISEASE WITHOUT ESOPHAGITIS: ICD-10-CM

## 2022-03-21 DIAGNOSIS — I10 ESSENTIAL HYPERTENSION: Chronic | ICD-10-CM

## 2022-03-21 DIAGNOSIS — E78.2 MIXED HYPERLIPIDEMIA: Chronic | ICD-10-CM

## 2022-03-21 DIAGNOSIS — I10 ESSENTIAL HYPERTENSION: Primary | Chronic | ICD-10-CM

## 2022-03-21 DIAGNOSIS — N40.0 BENIGN PROSTATIC HYPERPLASIA, UNSPECIFIED WHETHER LOWER URINARY TRACT SYMPTOMS PRESENT: ICD-10-CM

## 2022-03-21 LAB
ALBUMIN SERPL-MCNC: 4 G/DL (ref 3.4–5)
ALP SERPL-CCNC: 62 IU/L (ref 35–126)
ALT SERPL-CCNC: 20 IU/L (ref 16–63)
ANION GAP SERPL CALC-SCNC: 9 MEQ/L (ref 3–15)
AST SERPL-CCNC: 21 IU/L (ref 15–41)
BILIRUB SERPL-MCNC: 1 MG/DL (ref 0.3–1.2)
BUN SERPL-MCNC: 25 MG/DL (ref 8–20)
CALCIUM SERPL-MCNC: 9.5 MG/DL (ref 8.9–10.3)
CHLORIDE SERPL-SCNC: 106 MEQ/L (ref 98–109)
CO2 SERPL-SCNC: 26 MEQ/L (ref 22–32)
CREAT SERPL-MCNC: 1.5 MG/DL (ref 0.8–1.3)
ERYTHROCYTE [DISTWIDTH] IN BLOOD BY AUTOMATED COUNT: 15.3 % (ref 11.6–14.4)
GFR SERPL CREATININE-BSD FRML MDRD: 44.4 ML/MIN/1.73M*2
GLUCOSE SERPL-MCNC: 94 MG/DL (ref 70–99)
HCT VFR BLDCO AUTO: 39.2 % (ref 40.1–51)
HGB BLD-MCNC: 13.1 G/DL (ref 13.7–17.5)
MCH RBC QN AUTO: 33.9 PG (ref 28–33.2)
MCHC RBC AUTO-ENTMCNC: 33.4 G/DL (ref 32.2–36.5)
MCV RBC AUTO: 101.3 FL (ref 83–98)
PDW BLD AUTO: 10.2 FL (ref 9.4–12.4)
PLATELET # BLD AUTO: 158 K/UL (ref 150–350)
POTASSIUM SERPL-SCNC: 4.9 MEQ/L (ref 3.6–5.1)
PROT SERPL-MCNC: 6.3 G/DL (ref 6–8.2)
RBC # BLD AUTO: 3.87 M/UL (ref 4.5–5.8)
SODIUM SERPL-SCNC: 141 MEQ/L (ref 136–144)
WBC # BLD AUTO: 4.23 K/UL (ref 3.8–10.5)

## 2022-03-21 PROCEDURE — 36415 COLL VENOUS BLD VENIPUNCTURE: CPT

## 2022-03-21 PROCEDURE — 80053 COMPREHEN METABOLIC PANEL: CPT

## 2022-03-21 PROCEDURE — 83735 ASSAY OF MAGNESIUM: CPT

## 2022-03-21 PROCEDURE — 99214 OFFICE O/P EST MOD 30 MIN: CPT | Performed by: INTERNAL MEDICINE

## 2022-03-21 PROCEDURE — 85027 COMPLETE CBC AUTOMATED: CPT

## 2022-03-21 ASSESSMENT — ENCOUNTER SYMPTOMS
ABDOMINAL PAIN: 0
PALPITATIONS: 0
SHORTNESS OF BREATH: 0
FEVER: 0
CHILLS: 0
COUGH: 0
HEMATURIA: 0

## 2022-03-21 NOTE — ASSESSMENT & PLAN NOTE
Patient's lipid numbers are fairly good on his current dose of statin.  Continue current treatment.  This was just checked in November so he will not need this checked until fall.        34   minutes on this date of service was spent by Dr. Elliott  performing the following activities: Previsit medical record review, diagnostic testing review, medical consult report review, obtaining history from patient, performing examination, entering orders, comprehensive medication reconciliation, and documentation of this visit.

## 2022-03-21 NOTE — ASSESSMENT & PLAN NOTE
Patient's renal function has been stable with creatinine about 1.6.  CMP ordered for today.  I believe he follows up with renal on occasion.
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
## 2022-03-21 NOTE — ASSESSMENT & PLAN NOTE
Patient's blood pressure is reasonably well controlled on current regimen.  I am not concerned about the occasional elevation up to 140 systolic.  We discussed that in his late 80s he is at greater risk from blood pressure being too low than being too high.  Continue current treatment.  Check surveillance CMP as ordered at today's visit.

## 2022-03-22 LAB — MAGNESIUM SERPL-MCNC: 1.9 MG/DL (ref 1.8–2.5)

## 2022-03-23 ENCOUNTER — TELEPHONE (OUTPATIENT)
Dept: PRIMARY CARE | Facility: CLINIC | Age: 87
End: 2022-03-23
Payer: MEDICARE

## 2022-03-23 NOTE — TELEPHONE ENCOUNTER
----- Message from Alex Elliott MD sent at 3/23/2022  1:03 PM EDT -----  Please call Dr. KLEIN that his lab results are good.  Creatinine is down a little bit at 1.5 which is better.  It had been as high as 1.811 months ago.  Hemoglobin is normal.  Thanks

## 2022-04-15 ENCOUNTER — OFFICE VISIT (OUTPATIENT)
Dept: CARDIOLOGY | Facility: CLINIC | Age: 87
End: 2022-04-15
Payer: MEDICARE

## 2022-04-15 VITALS
HEIGHT: 64 IN | HEART RATE: 62 BPM | BODY MASS INDEX: 24.89 KG/M2 | WEIGHT: 145.8 LBS | OXYGEN SATURATION: 98 % | DIASTOLIC BLOOD PRESSURE: 78 MMHG | SYSTOLIC BLOOD PRESSURE: 124 MMHG

## 2022-04-15 DIAGNOSIS — I35.1 NONRHEUMATIC AORTIC VALVE INSUFFICIENCY: Primary | Chronic | ICD-10-CM

## 2022-04-15 DIAGNOSIS — N18.31 STAGE 3A CHRONIC KIDNEY DISEASE (CMS/HCC): Chronic | ICD-10-CM

## 2022-04-15 DIAGNOSIS — E78.2 MIXED HYPERLIPIDEMIA: Chronic | ICD-10-CM

## 2022-04-15 DIAGNOSIS — I10 BENIGN ESSENTIAL HYPERTENSION: Chronic | ICD-10-CM

## 2022-04-15 PROCEDURE — 93000 ELECTROCARDIOGRAM COMPLETE: CPT | Performed by: INTERNAL MEDICINE

## 2022-04-15 PROCEDURE — 99213 OFFICE O/P EST LOW 20 MIN: CPT | Performed by: INTERNAL MEDICINE

## 2022-04-15 ASSESSMENT — ENCOUNTER SYMPTOMS
LOSS OF BALANCE: 0
FEVER: 0
DOUBLE VISION: 0
DYSURIA: 0
HEARTBURN: 0
WEIGHT GAIN: 0
HEADACHES: 0
BLOATING: 0
DIZZINESS: 0
FREQUENCY: 0
BRUISES/BLEEDS EASILY: 0
PSYCHIATRIC NEGATIVE: 1
SHORTNESS OF BREATH: 0
CHANGE IN BOWEL HABIT: 0
HOARSE VOICE: 0
PND: 0
IRREGULAR HEARTBEAT: 0
ABDOMINAL PAIN: 0
PALPITATIONS: 0
DECREASED APPETITE: 0
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
## 2022-07-11 ENCOUNTER — OFFICE VISIT (OUTPATIENT)
Dept: PRIMARY CARE | Facility: CLINIC | Age: 87
End: 2022-07-11
Payer: MEDICARE

## 2022-07-11 VITALS
BODY MASS INDEX: 24.48 KG/M2 | WEIGHT: 142.6 LBS | TEMPERATURE: 97.5 F | DIASTOLIC BLOOD PRESSURE: 64 MMHG | OXYGEN SATURATION: 98 % | SYSTOLIC BLOOD PRESSURE: 110 MMHG | HEART RATE: 71 BPM

## 2022-07-11 DIAGNOSIS — N40.0 BENIGN PROSTATIC HYPERPLASIA, UNSPECIFIED WHETHER LOWER URINARY TRACT SYMPTOMS PRESENT: ICD-10-CM

## 2022-07-11 DIAGNOSIS — H91.93 BILATERAL HEARING LOSS, UNSPECIFIED HEARING LOSS TYPE: ICD-10-CM

## 2022-07-11 DIAGNOSIS — N18.31 STAGE 3A CHRONIC KIDNEY DISEASE (CMS/HCC): Chronic | ICD-10-CM

## 2022-07-11 DIAGNOSIS — E78.2 MIXED HYPERLIPIDEMIA: Chronic | ICD-10-CM

## 2022-07-11 DIAGNOSIS — I10 BENIGN ESSENTIAL HYPERTENSION: Primary | Chronic | ICD-10-CM

## 2022-07-11 PROCEDURE — 99214 OFFICE O/P EST MOD 30 MIN: CPT | Performed by: INTERNAL MEDICINE

## 2022-07-11 RX ORDER — CIPROFLOXACIN 500 MG/1
TABLET ORAL
COMMUNITY
Start: 2022-06-01 | End: 2023-05-15 | Stop reason: ALTCHOICE

## 2022-07-11 ASSESSMENT — ENCOUNTER SYMPTOMS
ABDOMINAL PAIN: 0
SHORTNESS OF BREATH: 0
HEMATURIA: 0
PALPITATIONS: 0
FEVER: 0
COUGH: 0
CHILLS: 0

## 2022-07-11 NOTE — ASSESSMENT & PLAN NOTE
Blood pressure stable on current regimen of lisinopril and amlodipine.  Continue current treatment.  Check surveillance CMP as ordered at today's visit.

## 2022-07-11 NOTE — ASSESSMENT & PLAN NOTE
Patient has bilateral hearing loss.  Saw an audiologist 2 to 3 years ago who diagnosed high-frequency loss.  He says this has been progressive somewhat recently.  He has used his brothers hearing aids with some improvement.  No cerumen seen on examination.  Crackling may have been some degree of minor eustachian tube dysfunction.  Recommend follow-up with audiology for evaluation.  Would be a good candidate for amplification.

## 2022-07-11 NOTE — ASSESSMENT & PLAN NOTE
Patient is on simvastatin for primary prevention.  He is over the age of 81 now 88 years old but he has done extremely well with this treatment and desires to continue it.  Check surveillance lipid profile in August or September that was ordered at today's visit.  Continue current treatment for now.        37   minutes on this date of service was spent by Dr. Elliott  performing the following activities: Previsit medical record review, diagnostic testing review, medical consult report review, obtaining history from patient, performing examination, entering orders, comprehensive medication reconciliation, counseling regarding audiology evaluation for hearing impairment, and documentation of this visit.
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
## 2022-07-11 NOTE — ASSESSMENT & PLAN NOTE
This appears to have been stable.  Recommend check CMP with other labs ordered at today's visit to be done late August or September.  Recommend follow-up with his nephrologist in the next 6 months or so.

## 2022-09-14 ENCOUNTER — APPOINTMENT (OUTPATIENT)
Dept: LAB | Facility: CLINIC | Age: 87
End: 2022-09-14
Attending: INTERNAL MEDICINE
Payer: MEDICARE

## 2022-09-14 DIAGNOSIS — E78.2 MIXED HYPERLIPIDEMIA: Chronic | ICD-10-CM

## 2022-09-14 DIAGNOSIS — I10 BENIGN ESSENTIAL HYPERTENSION: Chronic | ICD-10-CM

## 2022-09-14 LAB
ALBUMIN SERPL-MCNC: 4 G/DL (ref 3.4–5)
ALP SERPL-CCNC: 67 IU/L (ref 35–126)
ALT SERPL-CCNC: 17 IU/L (ref 16–63)
ANION GAP SERPL CALC-SCNC: 9 MEQ/L (ref 3–15)
AST SERPL-CCNC: 20 IU/L (ref 15–41)
BILIRUB SERPL-MCNC: 0.5 MG/DL (ref 0.3–1.2)
BUN SERPL-MCNC: 23 MG/DL (ref 8–20)
CALCIUM SERPL-MCNC: 9.3 MG/DL (ref 8.9–10.3)
CHLORIDE SERPL-SCNC: 106 MEQ/L (ref 98–109)
CHOLEST SERPL-MCNC: 146 MG/DL
CO2 SERPL-SCNC: 23 MEQ/L (ref 22–32)
CREAT SERPL-MCNC: 1.5 MG/DL (ref 0.8–1.3)
GFR SERPL CREATININE-BSD FRML MDRD: 44.3 ML/MIN/1.73M*2
GLUCOSE SERPL-MCNC: 96 MG/DL (ref 70–99)
HDLC SERPL-MCNC: 62 MG/DL
HDLC SERPL: 2.4 {RATIO}
LDLC SERPL CALC-MCNC: 78 MG/DL
NONHDLC SERPL-MCNC: 84 MG/DL
POTASSIUM SERPL-SCNC: 4.5 MEQ/L (ref 3.6–5.1)
PROT SERPL-MCNC: 6.8 G/DL (ref 6–8.2)
SODIUM SERPL-SCNC: 138 MEQ/L (ref 136–144)
TRIGL SERPL-MCNC: 30 MG/DL (ref 30–149)

## 2022-09-14 PROCEDURE — 36415 COLL VENOUS BLD VENIPUNCTURE: CPT

## 2022-09-14 PROCEDURE — 80061 LIPID PANEL: CPT

## 2022-09-14 PROCEDURE — 80053 COMPREHEN METABOLIC PANEL: CPT

## 2022-09-19 ENCOUNTER — TELEPHONE (OUTPATIENT)
Dept: PRIMARY CARE | Facility: CLINIC | Age: 87
End: 2022-09-19
Payer: MEDICARE

## 2022-09-19 NOTE — TELEPHONE ENCOUNTER
----- Message from Alex Elliott MD sent at 9/19/2022  2:23 PM EDT -----  Please call patient his lipid numbers are good and renal function is stable.  Thanks

## 2022-11-14 ENCOUNTER — OFFICE VISIT (OUTPATIENT)
Dept: PRIMARY CARE | Facility: CLINIC | Age: 87
End: 2022-11-14
Payer: MEDICARE

## 2022-11-14 VITALS
HEART RATE: 73 BPM | OXYGEN SATURATION: 98 % | TEMPERATURE: 97.4 F | DIASTOLIC BLOOD PRESSURE: 78 MMHG | WEIGHT: 143 LBS | BODY MASS INDEX: 24.55 KG/M2 | SYSTOLIC BLOOD PRESSURE: 116 MMHG

## 2022-11-14 DIAGNOSIS — I10 BENIGN ESSENTIAL HYPERTENSION: Primary | Chronic | ICD-10-CM

## 2022-11-14 DIAGNOSIS — E78.2 MIXED HYPERLIPIDEMIA: Chronic | ICD-10-CM

## 2022-11-14 DIAGNOSIS — N18.31 STAGE 3A CHRONIC KIDNEY DISEASE (CMS/HCC): Chronic | ICD-10-CM

## 2022-11-14 DIAGNOSIS — E83.42 HYPOMAGNESEMIA: ICD-10-CM

## 2022-11-14 DIAGNOSIS — N40.0 BENIGN PROSTATIC HYPERPLASIA, UNSPECIFIED WHETHER LOWER URINARY TRACT SYMPTOMS PRESENT: ICD-10-CM

## 2022-11-14 PROCEDURE — 0134A MODERNA COVID-19 VACCINE BIVALENT BOOSTER 12 YEARS A: CPT | Performed by: INTERNAL MEDICINE

## 2022-11-14 PROCEDURE — 99214 OFFICE O/P EST MOD 30 MIN: CPT | Mod: 25 | Performed by: INTERNAL MEDICINE

## 2022-11-14 PROCEDURE — 91313 MODERNA COVID-19 VACCINE BIVALENT BOOSTER 12 YEARS A: CPT | Performed by: INTERNAL MEDICINE

## 2022-11-14 ASSESSMENT — ENCOUNTER SYMPTOMS
FEVER: 0
CHILLS: 0
ABDOMINAL PAIN: 0
PALPITATIONS: 0
SHORTNESS OF BREATH: 0
COUGH: 0
HEMATURIA: 0

## 2022-11-14 NOTE — ASSESSMENT & PLAN NOTE
Consolidated assessment and plan:    For hypertension: Patient's blood pressures well controlled on current regimen.  Continue current treatment.  And check CMP with other labs in January or February.    Chronic kidney disease: Creatinine is stable at 1.5.  He asked whether aspirin taken as 3 tablets of 325 mg once per week when shooting trap is safe for the kidneys.  We discussed that no one knows but it is probably far safer than taking it more regularly.  He reports Tylenol did not help him with this intermittent discomfort.    History of anemia: We will recheck CBC with other blood work especially with his aspirin therapy.    Hypercholesterolemia: Lipid numbers excellent.  This is primarily a primary prevention strategy.  He has done well with it so he is continuing current treatment despite his advancing age of 88 years old.  When he has 89 we might do a gradual dose reduction given unclarity of benefits of treatment and that age bracket.    Hypomagnesemia: Due for magnesium check.      35   minutes on this date of service was spent by Dr. Elliott  performing the following activities: Previsit medical record review, diagnostic testing review, medical consult report review, obtaining history from patient, performing examination, entering orders, comprehensive medication reconciliation, counseling regarding preventive vaccination and I recommended by sharath COVID-vaccine today followed by flu vaccine in the community pharmacy within 5 to 7 days, and documentation of this visit.     - likely toxic metabolic encephalopathy vs. vascular dementia; however pt also has R facial droop and R blown pupil  - pts was able to converse normally prior to last admission  - CT head: Opacification of the right tympanomastoid cavity which may be secondary to an effusion or inflammatory related soft tissue  - ENT consulted for further evaluation based on CT results: recommends steroids (will not be given due to risk of infection), taping eye closed if pt unable to keep closed and eye drops - CTIAC d/c'ed given it would be unlikely to change pts. outlook

## 2022-11-14 NOTE — PROGRESS NOTES
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 patient had an allergic reaction of any severity to any vaccine other than a COVID-19 vaccine or to any injectable therapy?     Answer:   No     Order Specific Question:   Does the patient have a history of anaphylaxis due to any cause other than a COVID-19 vaccine or its ingredients?     Answer:   No     Order Specific Question:   Does the patient have a history of myocarditis, pericarditis, Multisystem Inflammatory Syndrome in Children (MIS-C) or Multisystem Inflammatory Syndrome Adults (MIS-A)?     Answer:   No     Order Specific Question:   Observation time:     Answer:   Observe for 15 minutes    CBC and differential     Standing Status:   Future     Standing Expiration Date:   11/14/2023     Order Specific Question:   Release to patient     Answer:   Immediate    Comprehensive metabolic panel     Standing Status:   Future     Standing Expiration Date:   11/14/2023     Order Specific Question:   Release to patient     Answer:   Immediate    Magnesium     Standing Status:   Future     Standing Expiration Date:   11/14/2023     Order Specific Question:   Release to patient     Answer:   Immediate         Alex Elliott MD  11/14/2022

## 2023-01-16 DIAGNOSIS — Z01.812 PRE-OPERATIVE LABORATORY EXAMINATION: Primary | ICD-10-CM

## 2023-02-22 ENCOUNTER — APPOINTMENT (OUTPATIENT)
Dept: LAB | Facility: CLINIC | Age: 88
End: 2023-02-22
Attending: INTERNAL MEDICINE
Payer: MEDICARE

## 2023-02-22 DIAGNOSIS — I10 BENIGN ESSENTIAL HYPERTENSION: Chronic | ICD-10-CM

## 2023-02-22 DIAGNOSIS — E83.42 HYPOMAGNESEMIA: ICD-10-CM

## 2023-02-22 LAB
ALBUMIN SERPL-MCNC: 4.3 G/DL (ref 3.4–5)
ALP SERPL-CCNC: 73 IU/L (ref 35–126)
ALT SERPL-CCNC: 18 IU/L (ref 16–63)
ANION GAP SERPL CALC-SCNC: 9 MEQ/L (ref 3–15)
AST SERPL-CCNC: 24 IU/L (ref 15–41)
BASOPHILS # BLD: 0.06 K/UL (ref 0.01–0.1)
BASOPHILS NFR BLD: 1.1 %
BILIRUB SERPL-MCNC: 1 MG/DL (ref 0.3–1.2)
BUN SERPL-MCNC: 22 MG/DL (ref 8–20)
CALCIUM SERPL-MCNC: 9.7 MG/DL (ref 8.9–10.3)
CHLORIDE SERPL-SCNC: 109 MEQ/L (ref 98–109)
CO2 SERPL-SCNC: 25 MEQ/L (ref 22–32)
CREAT SERPL-MCNC: 1.5 MG/DL (ref 0.8–1.3)
DIFFERENTIAL METHOD BLD: ABNORMAL
EOSINOPHIL # BLD: 0.07 K/UL (ref 0.04–0.54)
EOSINOPHIL NFR BLD: 1.3 %
ERYTHROCYTE [DISTWIDTH] IN BLOOD BY AUTOMATED COUNT: 15.5 % (ref 11.6–14.4)
GFR SERPL CREATININE-BSD FRML MDRD: 44.3 ML/MIN/1.73M*2
GLUCOSE SERPL-MCNC: 99 MG/DL (ref 70–99)
HCT VFR BLDCO AUTO: 41.6 % (ref 40.1–51)
HGB BLD-MCNC: 13.6 G/DL (ref 13.7–17.5)
IMM GRANULOCYTES # BLD AUTO: 0.01 K/UL (ref 0–0.08)
IMM GRANULOCYTES NFR BLD AUTO: 0.2 %
LYMPHOCYTES # BLD: 2.34 K/UL (ref 1.2–3.5)
LYMPHOCYTES NFR BLD: 42.7 %
MAGNESIUM SERPL-MCNC: 2.1 MG/DL (ref 1.8–2.5)
MCH RBC QN AUTO: 33.1 PG (ref 28–33.2)
MCHC RBC AUTO-ENTMCNC: 32.7 G/DL (ref 32.2–36.5)
MCV RBC AUTO: 101.2 FL (ref 83–98)
MONOCYTES # BLD: 0.59 K/UL (ref 0.3–1)
MONOCYTES NFR BLD: 10.8 %
NEUTROPHILS # BLD: 2.41 K/UL (ref 1.7–7)
NEUTS SEG NFR BLD: 43.9 %
NRBC BLD-RTO: 0 %
PDW BLD AUTO: 10.2 FL (ref 9.4–12.4)
PLATELET # BLD AUTO: 158 K/UL (ref 150–350)
POTASSIUM SERPL-SCNC: 5 MEQ/L (ref 3.6–5.1)
PROT SERPL-MCNC: 7 G/DL (ref 6–8.2)
RBC # BLD AUTO: 4.11 M/UL (ref 4.5–5.8)
SODIUM SERPL-SCNC: 143 MEQ/L (ref 136–144)
WBC # BLD AUTO: 5.48 K/UL (ref 3.8–10.5)

## 2023-02-22 PROCEDURE — 83735 ASSAY OF MAGNESIUM: CPT

## 2023-02-22 PROCEDURE — 85025 COMPLETE CBC W/AUTO DIFF WBC: CPT

## 2023-02-22 PROCEDURE — 36415 COLL VENOUS BLD VENIPUNCTURE: CPT

## 2023-02-22 PROCEDURE — 80053 COMPREHEN METABOLIC PANEL: CPT

## 2023-02-27 ENCOUNTER — OFFICE VISIT (OUTPATIENT)
Dept: PRIMARY CARE | Facility: CLINIC | Age: 88
End: 2023-02-27
Payer: MEDICARE

## 2023-02-27 VITALS
DIASTOLIC BLOOD PRESSURE: 64 MMHG | RESPIRATION RATE: 16 BRPM | HEIGHT: 64 IN | HEART RATE: 64 BPM | OXYGEN SATURATION: 96 % | WEIGHT: 142.5 LBS | SYSTOLIC BLOOD PRESSURE: 122 MMHG | BODY MASS INDEX: 24.33 KG/M2

## 2023-02-27 DIAGNOSIS — K21.9 GASTROESOPHAGEAL REFLUX DISEASE WITHOUT ESOPHAGITIS: ICD-10-CM

## 2023-02-27 DIAGNOSIS — N18.31 STAGE 3A CHRONIC KIDNEY DISEASE (CMS/HCC): Chronic | ICD-10-CM

## 2023-02-27 DIAGNOSIS — E78.2 MIXED HYPERLIPIDEMIA: Chronic | ICD-10-CM

## 2023-02-27 DIAGNOSIS — E83.42 HYPOMAGNESEMIA: ICD-10-CM

## 2023-02-27 DIAGNOSIS — I10 BENIGN ESSENTIAL HYPERTENSION: Primary | Chronic | ICD-10-CM

## 2023-02-27 PROCEDURE — 99214 OFFICE O/P EST MOD 30 MIN: CPT | Performed by: INTERNAL MEDICINE

## 2023-02-27 ASSESSMENT — ENCOUNTER SYMPTOMS
SHORTNESS OF BREATH: 0
HEMATURIA: 0
CHILLS: 0
ABDOMINAL PAIN: 0
PALPITATIONS: 0
FEVER: 0
COUGH: 0

## 2023-02-27 ASSESSMENT — PATIENT HEALTH QUESTIONNAIRE - PHQ9: SUM OF ALL RESPONSES TO PHQ9 QUESTIONS 1 & 2: 0

## 2023-02-27 NOTE — ASSESSMENT & PLAN NOTE
____________________________________________  Consolidated assessment and plan:    Hypertension: Blood pressure well controlled on current regimen.  Continue current treatment.  He does occasionally get venous insufficiency/edema from the amlodipine but more on hot summer days.  He asked whether he can take hydrochlorothiazide 12.5 as needed and I think on a as needed basis that would be okay to try if needed.  We did explicitly discussed that blood pressure running above 140 is not of concern.  In his age bracket of soon-to-be 89 years old risk of low blood pressure much greater than the risk of slightly elevated blood pressure.    Chronic kidney disease: Creatinine has been very stable.  We will check next time following next visit in about 3 months.    Hypercholesterolemia: Lipid numbers have been excellent.  Recommend continue current treatment.    BPH: Stable on Proscar.    Hypomagnesemia: Patient takes low-dose Mag-Ox.  Will check magnesium periodically.    Prevention: Patient is not due for any vaccinations except Shingrix.  I do recommend he get the Shingrix vaccine and he will call his local pharmacy to arrange for this.        33    minutes on this date of service was spent by Dr. Elliott  performing the following activities: Previsit medical record review, diagnostic testing review, medical consult report review, obtaining history from patient, performing examination, entering orders, comprehensive medication reconciliation, counseling regarding management of hypertension and venous insufficiency, and documentation of this visit.
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
## 2023-05-15 ENCOUNTER — OFFICE VISIT (OUTPATIENT)
Dept: PRIMARY CARE | Facility: CLINIC | Age: 88
End: 2023-05-15
Payer: MEDICARE

## 2023-05-15 VITALS
HEART RATE: 61 BPM | BODY MASS INDEX: 24.48 KG/M2 | WEIGHT: 143.4 LBS | SYSTOLIC BLOOD PRESSURE: 128 MMHG | DIASTOLIC BLOOD PRESSURE: 70 MMHG | HEIGHT: 64 IN | OXYGEN SATURATION: 96 % | RESPIRATION RATE: 16 BRPM

## 2023-05-15 DIAGNOSIS — E83.42 HYPOMAGNESEMIA: ICD-10-CM

## 2023-05-15 DIAGNOSIS — E78.2 MIXED HYPERLIPIDEMIA: Chronic | ICD-10-CM

## 2023-05-15 DIAGNOSIS — I10 BENIGN ESSENTIAL HYPERTENSION: Primary | Chronic | ICD-10-CM

## 2023-05-15 DIAGNOSIS — M81.0 AGE RELATED OSTEOPOROSIS, UNSPECIFIED PATHOLOGICAL FRACTURE PRESENCE: ICD-10-CM

## 2023-05-15 DIAGNOSIS — K21.9 GASTROESOPHAGEAL REFLUX DISEASE WITHOUT ESOPHAGITIS: ICD-10-CM

## 2023-05-15 DIAGNOSIS — N18.31 STAGE 3A CHRONIC KIDNEY DISEASE (CMS/HCC): Chronic | ICD-10-CM

## 2023-05-15 PROCEDURE — 99214 OFFICE O/P EST MOD 30 MIN: CPT | Performed by: INTERNAL MEDICINE

## 2023-05-15 ASSESSMENT — ENCOUNTER SYMPTOMS
HEMATURIA: 0
SHORTNESS OF BREATH: 0
COUGH: 0
FEVER: 0
PALPITATIONS: 0
ABDOMINAL PAIN: 0
CHILLS: 0

## 2023-05-15 NOTE — ASSESSMENT & PLAN NOTE
________________________  Consolidated Assessment/Plan:    Essential hypertension: Stable on current regimen. Blood pressure in office was 128/70.  He has mild edema due to venous insufficiency from amlodipine but this is very mild and well-tolerated.  Continue current treatment.    GERD: Stable. He has not had any flare ups recently and has not needed Prilosec.    Stage 3a chronic kidney disease: Most recent creatinine stable at 1.5 as above.  Stable on current regimen.    Mixed hyperlipidemia: Stable on current regimen.  This is essentially a primary prevention strategy but he has done extremely well with that and is 88 years old and is opted to continue treatment.    Hypomagnesemia: Currently stable.    Osteoporosis: Last DEXA scan about 4 years ago showed osteoporosis.  He met with rheumatology who had recommended Prolia injections but he opted not to pursue this treatment.  Has not had a vitamin D level recently.  I ordered repeat DEXA scan. I ordered vitamin D level. I recommended he start taking vitamin D supplements daily at a dosage of 1000 units daily.  We will adjust this dose if his vitamin D level is less than 30-35.  If DEXA scan shows progression I would recommend either oral bisphosphonates or Prolia as previously discussed.  I do not see any medical contraindication to oral bisphosphonate in his case.    By signing my name below, I, Harini Neil, attest that this documentation has been prepared under the direction and in the presence of Alex Elliott MD  5/15/2023 9:59 AM    Initial scribing of the note was done by hardy and final editing and complete note scribing was done by Dr. Elliott.       35   minutes on this date of service was spent by Dr. Elliott  performing the following activities: Previsit medical record review, diagnostic testing review, medical consult report review, obtaining history from patient, performing examination, entering orders, comprehensive medication  reconciliation, counseling regarding osteoporosis evaluation and management, and documentation of this visit.

## 2023-05-15 NOTE — PROGRESS NOTES
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 He has not had any flare ups recently and has not needed Prilosec.    Stage 3a chronic kidney disease: Most recent creatinine stable at 1.5 as above.  Stable on current regimen.    Mixed hyperlipidemia: Stable on current regimen.  This is essentially a primary prevention strategy but he has done extremely well with that and is 88 years old and is opted to continue treatment.    Hypomagnesemia: Currently stable.    Osteoporosis: Last DEXA scan about 4 years ago showed osteoporosis.  He met with rheumatology who had recommended Prolia injections but he opted not to pursue this treatment.  Has not had a vitamin D level recently.  I ordered repeat DEXA scan. I ordered vitamin D level. I recommended he start taking vitamin D supplements daily at a dosage of 1000 units daily.  We will adjust this dose if his vitamin D level is less than 30-35.  If DEXA scan shows progression I would recommend either oral bisphosphonates or Prolia as previously discussed.  I do not see any medical contraindication to oral bisphosphonate in his case.    By signing my name below, I, Harini Neil, attest that this documentation has been prepared under the direction and in the presence of Alex Elliott MD  5/15/2023 9:59  AM    Initial scribing of the note was done by hardy and final editing and complete note scribing was done by Dr. Elliott.       35   minutes on this date of service was spent by Dr. Elliott  performing the following activities: Previsit medical record review, diagnostic testing review, medical consult report review, obtaining history from patient, performing examination, entering orders, comprehensive medication reconciliation, counseling regarding osteoporosis evaluation and management, and documentation of this visit.                Return in about 3 months (around 8/15/2023).    Orders Placed This Encounter   Procedures   • DEXA BONE DENSITY     Standing Status:   Future     Standing Expiration Date:   5/15/2024     Order Specific Question:   Does the patient take a Calcium supplement?     Answer:   No     Order Specific Question:   Release to patient     Answer:   Immediate   • Vitamin D 1,25-Dihydroxy     Standing Status:   Future     Standing Expiration Date:   5/15/2024     Order Specific Question:   Release to patient     Answer:   Immediate   • Comprehensive metabolic panel     Standing Status:   Future     Standing Expiration Date:   5/15/2024     Order Specific Question:   Release to patient     Answer:   Immediate         Alex Elliott MD  5/15/2023

## 2023-06-23 ENCOUNTER — OFFICE VISIT (OUTPATIENT)
Dept: CARDIOLOGY | Facility: CLINIC | Age: 88
End: 2023-06-23
Payer: MEDICARE

## 2023-06-23 VITALS
WEIGHT: 141.8 LBS | SYSTOLIC BLOOD PRESSURE: 114 MMHG | DIASTOLIC BLOOD PRESSURE: 72 MMHG | OXYGEN SATURATION: 97 % | BODY MASS INDEX: 24.21 KG/M2 | HEIGHT: 64 IN | HEART RATE: 60 BPM

## 2023-06-23 DIAGNOSIS — I35.1 NONRHEUMATIC AORTIC VALVE INSUFFICIENCY: Chronic | ICD-10-CM

## 2023-06-23 DIAGNOSIS — N18.30 STAGE 3 CHRONIC KIDNEY DISEASE, UNSPECIFIED WHETHER STAGE 3A OR 3B CKD (CMS/HCC): ICD-10-CM

## 2023-06-23 DIAGNOSIS — I10 BENIGN ESSENTIAL HYPERTENSION: Primary | Chronic | ICD-10-CM

## 2023-06-23 LAB
ATRIAL RATE: 60
P AXIS: 43
PR INTERVAL: 182
QRS DURATION: 80
QT INTERVAL: 408
QTC CALCULATION(BAZETT): 408
R AXIS: 53
T WAVE AXIS: 60
VENTRICULAR RATE: 60

## 2023-06-23 PROCEDURE — 99213 OFFICE O/P EST LOW 20 MIN: CPT | Performed by: INTERNAL MEDICINE

## 2023-06-23 PROCEDURE — 93000 ELECTROCARDIOGRAM COMPLETE: CPT | Performed by: INTERNAL MEDICINE

## 2023-06-23 ASSESSMENT — ENCOUNTER SYMPTOMS
HEADACHES: 0
BLOATING: 0
WEIGHT GAIN: 0
LOSS OF BALANCE: 0
HOARSE VOICE: 0
DECREASED APPETITE: 0
FEVER: 0
BRUISES/BLEEDS EASILY: 0
DOUBLE VISION: 0
HEARTBURN: 0
CHANGE IN BOWEL HABIT: 0
WEIGHT LOSS: 0
PND: 0
PALPITATIONS: 0
ABDOMINAL PAIN: 0
DYSURIA: 0
SHORTNESS OF BREATH: 0
ENDOCRINE NEGATIVE: 1
DIZZINESS: 0
FREQUENCY: 0
PSYCHIATRIC NEGATIVE: 1
IRREGULAR HEARTBEAT: 0

## 2023-06-23 NOTE — LETTER
June 23, 2023     Alex Elliott MD  [address]  [facility] [address]    Patient: Efrain Cardona Jr.  YOB: 1934  Date of Visit: 6/23/2023      Dear Dr. Elliott:    Thank you for referring Efrain Cardona Jr. to me for evaluation. Below are my notes for this consultation.    If you have questions, please do not hesitate to call me. I look forward to following your patient along with you.         Sincerely,        Timoteo Kim MD        CC: MD Charles Estes MD David J Ellis, MD Coady, Timoteo WAN MD  6/23/2023 12:05 PM  Signed      Cardiology Consult     Reason for visit: Scheduled cardiology follow-up    Past medical history  Extrasystoles  Status post nephrectomy  Chronic renal insufficiency  Mild aortic valve insufficiency     HPI     Efrain Cardona Jr. is a 89 y.o. male  who presents for follow-up.     No specific cardiovascular complaints.  He continues to enjoy an active vigorous lifestyle exercising vigorously 3 times a week.  He does not have angina or excessive dyspnea.  His exercise regimen has not declined.      He does report transient limited right ankle edema that is not progressive.    No signs or symptoms of viral infection.  Good appetite and stable weight.  No hospitalizations.  No new medications.    Past Medical History:   Diagnosis Date   • Abnormal EKG    • Arrhythmia    • Chronic kidney disease    • Disease of thyroid gland     1991   • Diverticulitis of colon     Diverticulosis   • GI (gastrointestinal bleed)     X3   last 2015   • Heart murmur    • Hyperlipidemia    • Hypertension    • Infectious viral hepatitis    • Lipid disorder    • Prostate enlargement    • Renal failure      Past Surgical History:   Procedure Laterality Date   • HERNIA REPAIR      7/2001   • HIP FRACTURE SURGERY Left 11/27/2020   • KIDNEY SURGERY Left     kidney cancer     Griseofulvin and Tetracycline  Current Outpatient Medications    Medication Sig Dispense Refill   • amLODIPine (NORVASC) 5 mg tablet Take 7.5 mg by mouth daily.     • diphenoxylate-atropine (LOMOTIL) 2.5-0.025 mg per tablet Take 1 tablet by mouth daily as needed for diarrhea. 30 tablet 1   • finasteride (PROSCAR) 5 mg tablet Take 5 mg by mouth daily.     • lisinopriL (PRINIVIL) 40 mg tablet Take 80 mg by mouth daily.     • psyllium husk (METAMUCIL ORAL) Take 2 capsules by mouth daily. Pt is unaware of dosage     • sildenafiL, pulm.hypertension, 20 mg tablet take 5 tablets by mouth as directed if needed     • simvastatin (ZOCOR) 40 mg tablet Take 1 tablet by mouth daily.  0     No current facility-administered medications for this visit.     Social History     Socioeconomic History   • Marital status:      Spouse name: None   • Number of children: 4   • Years of education: None   • Highest education level: None   Tobacco Use   • Smoking status: Former     Types: Cigarettes     Quit date: 1970     Years since quittin.5   • Smokeless tobacco: Never   • Tobacco comments:     PIPE    Vaping Use   • Vaping Use: Never used   Substance and Sexual Activity   • Alcohol use: Yes     Comment: [place]/daily   • Drug use: No   • Sexual activity: Not Currently     Family History   Problem Relation Age of Onset   • Lung cancer Biological Mother    • Leukemia Biological Father         Review of Systems   Constitutional: Negative for decreased appetite, fever, weight gain and weight loss.   HENT: Negative for hearing loss and hoarse voice.    Eyes: Negative for double vision and visual disturbance.   Cardiovascular: Negative for chest pain, irregular heartbeat, leg swelling, palpitations and paroxysmal nocturnal dyspnea.   Respiratory: Negative for shortness of breath.    Endocrine: Negative.    Hematologic/Lymphatic: Negative for bleeding problem. Does not bruise/bleed easily.   Skin: Negative for rash and skin cancer.   Musculoskeletal: Negative for arthritis and joint pain.    Gastrointestinal: Negative for bloating, abdominal pain, change in bowel habit and heartburn.   Genitourinary: Negative for dysuria, frequency and nocturia.   Neurological: Negative for dizziness, headaches and loss of balance.   Psychiatric/Behavioral: Negative.    Allergic/Immunologic: Negative for environmental allergies.      Objective   Vitals:    06/23/23 1114   BP: 114/72   Pulse: 60   SpO2: 97%   Weight 141.5.pounds  Blood pressure seated position standard cuff 114/72.  No difference right left arms.  No orthostatic change.    Physical Exam  Vitals and nursing note reviewed.   Constitutional:       General: He is not in acute distress.     Comments: Pleasant fit appearing man not acutely distressed.   HENT:      Head: Normocephalic and atraumatic.      Right Ear: External ear normal.      Left Ear: External ear normal.      Nose: Nose normal.      Mouth/Throat:      Mouth: Mucous membranes are dry.      Pharynx: Oropharynx is clear.   Eyes:      General: No scleral icterus.     Conjunctiva/sclera: Conjunctivae normal.   Neck:      Thyroid: No thyromegaly.      Vascular: No carotid bruit or JVD.   Cardiovascular:      Rate and Rhythm: Normal rate and regular rhythm.      Pulses: Normal pulses and intact distal pulses.      Heart sounds: Murmur (Soft systolic murmur along the right sternal border.  No diastolic murmur heard at rest or provoked with maneuver.) heard.      No gallop.   Pulmonary:      Effort: Pulmonary effort is normal.      Breath sounds: Normal breath sounds. No wheezing.   Abdominal:      General: Bowel sounds are normal.      Palpations: Abdomen is soft.      Tenderness: There is no abdominal tenderness. There is no rebound.   Musculoskeletal:         General: Normal range of motion.      Cervical back: Normal range of motion and neck supple.      Right lower leg: Edema (Trace around the malleolus) present.      Left lower leg: No edema.      Comments: Mild scoliosis of the lumbar spine.    Skin:     General: Skin is warm and dry.      Capillary Refill: Capillary refill takes less than 2 seconds.   Neurological:      General: No focal deficit present.      Mental Status: He is alert and oriented to person, place, and time.   Psychiatric:         Mood and Affect: Mood normal.         Behavior: Behavior normal.         Thought Content: Thought content normal.         Judgment: Judgment normal.       Labs   Lab Results   Component Value Date    WBC 5.48 02/22/2023    HGB 13.6 (L) 02/22/2023    HCT 41.6 02/22/2023     02/22/2023    CHOL 146 09/14/2022    TRIG 30 09/14/2022    HDL 62 09/14/2022    ALT 18 02/22/2023    AST 24 02/22/2023     02/22/2023    K 5.0 02/22/2023     02/22/2023    CREATININE 1.5 (H) 02/22/2023    BUN 22 (H) 02/22/2023    CO2 25 02/22/2023    TSH 1.61 11/13/2018    PSA 2.02 08/26/2015    INR 1.1 11/28/2020    HGBA1C 4.8 08/26/2015   ECG        Assessment and plan:    1.  Occasional extrasystoles on EKG. Resolved.         2.  Hypertension.  Adequately controlled on his current medications.  No changes were advised.    3.  Aortic insufficiency.  No change in the murmur on exam.  No symptoms or exam findings of congestive heart failure.    4.  Single kidney status post nephrectomy.  Stable creatinine as of recent laboratory studies.    5.  Gastrointestinal bleeding secondary to aspirin.  No clear etiology found despite aggressive GI workup.  He is off aspirin and his hemoglobin is stable.    6.  History of thyroiditis.  Recent laboratory studies reveal normal TSH.    7.  Status post traumatic hip fracture.  Doing well status post surgical repair.    8.  History of dyslipidemia.  Excellent cholesterol profile in response to his current dietary habits and exercise regimen.    I hope to see him in 12 months in follow-up to today's visit. He is encouraged to call with any questions regarding blood pressure management or other cardiovascular issues.     Timoteo Kim,  MD  6/23/2023

## 2023-06-23 NOTE — PROGRESS NOTES
Cardiology Consult     Reason for visit: Scheduled cardiology follow-up    Past medical history  Extrasystoles  Status post nephrectomy  Chronic renal insufficiency  Mild aortic valve insufficiency     HPI     Efrain Cardona Jr. is a 89 y.o. male  who presents for follow-up.     No specific cardiovascular complaints.  He continues to enjoy an active vigorous lifestyle exercising vigorously 3 times a week.  He does not have angina or excessive dyspnea.  His exercise regimen has not declined.      He does report transient limited right ankle edema that is not progressive.    No signs or symptoms of viral infection.  Good appetite and stable weight.  No hospitalizations.  No new medications.    Past Medical History:   Diagnosis Date   • Abnormal EKG    • Arrhythmia    • Chronic kidney disease    • Disease of thyroid gland     1991   • Diverticulitis of colon     Diverticulosis   • GI (gastrointestinal bleed)     X3   last 2015   • Heart murmur    • Hyperlipidemia    • Hypertension    • Infectious viral hepatitis    • Lipid disorder    • Prostate enlargement    • Renal failure      Past Surgical History:   Procedure Laterality Date   • HERNIA REPAIR      7/2001   • HIP FRACTURE SURGERY Left 11/27/2020   • KIDNEY SURGERY Left     kidney cancer     Griseofulvin and Tetracycline  Current Outpatient Medications   Medication Sig Dispense Refill   • amLODIPine (NORVASC) 5 mg tablet Take 7.5 mg by mouth daily.     • diphenoxylate-atropine (LOMOTIL) 2.5-0.025 mg per tablet Take 1 tablet by mouth daily as needed for diarrhea. 30 tablet 1   • finasteride (PROSCAR) 5 mg tablet Take 5 mg by mouth daily.     • lisinopriL (PRINIVIL) 40 mg tablet Take 80 mg by mouth daily.     • psyllium husk (METAMUCIL ORAL) Take 2 capsules by mouth daily. Pt is unaware of dosage     • sildenafiL, pulm.hypertension, 20 mg tablet take 5 tablets by mouth as directed if needed     • simvastatin (ZOCOR) 40 mg tablet Take 1 tablet by mouth daily.  0      No current facility-administered medications for this visit.     Social History     Socioeconomic History   • Marital status:      Spouse name: None   • Number of children: 4   • Years of education: None   • Highest education level: None   Tobacco Use   • Smoking status: Former     Types: Cigarettes     Quit date: 1970     Years since quittin.5   • Smokeless tobacco: Never   • Tobacco comments:     PIPE    Vaping Use   • Vaping Use: Never used   Substance and Sexual Activity   • Alcohol use: Yes     Comment: [place]/daily   • Drug use: No   • Sexual activity: Not Currently     Family History   Problem Relation Age of Onset   • Lung cancer Biological Mother    • Leukemia Biological Father         Review of Systems   Constitutional: Negative for decreased appetite, fever, weight gain and weight loss.   HENT: Negative for hearing loss and hoarse voice.    Eyes: Negative for double vision and visual disturbance.   Cardiovascular: Negative for chest pain, irregular heartbeat, leg swelling, palpitations and paroxysmal nocturnal dyspnea.   Respiratory: Negative for shortness of breath.    Endocrine: Negative.    Hematologic/Lymphatic: Negative for bleeding problem. Does not bruise/bleed easily.   Skin: Negative for rash and skin cancer.   Musculoskeletal: Negative for arthritis and joint pain.   Gastrointestinal: Negative for bloating, abdominal pain, change in bowel habit and heartburn.   Genitourinary: Negative for dysuria, frequency and nocturia.   Neurological: Negative for dizziness, headaches and loss of balance.   Psychiatric/Behavioral: Negative.    Allergic/Immunologic: Negative for environmental allergies.      Objective   Vitals:    23 1114   BP: 114/72   Pulse: 60   SpO2: 97%   Weight 141.5.pounds  Blood pressure seated position standard cuff 114/72.  No difference right left arms.  No orthostatic change.    Physical Exam  Vitals and nursing note reviewed.   Constitutional:        General: He is not in acute distress.     Comments: Pleasant fit appearing man not acutely distressed.   HENT:      Head: Normocephalic and atraumatic.      Right Ear: External ear normal.      Left Ear: External ear normal.      Nose: Nose normal.      Mouth/Throat:      Mouth: Mucous membranes are dry.      Pharynx: Oropharynx is clear.   Eyes:      General: No scleral icterus.     Conjunctiva/sclera: Conjunctivae normal.   Neck:      Thyroid: No thyromegaly.      Vascular: No carotid bruit or JVD.   Cardiovascular:      Rate and Rhythm: Normal rate and regular rhythm.      Pulses: Normal pulses and intact distal pulses.      Heart sounds: Murmur (Soft systolic murmur along the right sternal border.  No diastolic murmur heard at rest or provoked with maneuver.) heard.      No gallop.   Pulmonary:      Effort: Pulmonary effort is normal.      Breath sounds: Normal breath sounds. No wheezing.   Abdominal:      General: Bowel sounds are normal.      Palpations: Abdomen is soft.      Tenderness: There is no abdominal tenderness. There is no rebound.   Musculoskeletal:         General: Normal range of motion.      Cervical back: Normal range of motion and neck supple.      Right lower leg: Edema (Trace around the malleolus) present.      Left lower leg: No edema.      Comments: Mild scoliosis of the lumbar spine.   Skin:     General: Skin is warm and dry.      Capillary Refill: Capillary refill takes less than 2 seconds.   Neurological:      General: No focal deficit present.      Mental Status: He is alert and oriented to person, place, and time.   Psychiatric:         Mood and Affect: Mood normal.         Behavior: Behavior normal.         Thought Content: Thought content normal.         Judgment: Judgment normal.       Labs   Lab Results   Component Value Date    WBC 5.48 02/22/2023    HGB 13.6 (L) 02/22/2023    HCT 41.6 02/22/2023     02/22/2023    CHOL 146 09/14/2022    TRIG 30 09/14/2022    HDL 62  09/14/2022    ALT 18 02/22/2023    AST 24 02/22/2023     02/22/2023    K 5.0 02/22/2023     02/22/2023    CREATININE 1.5 (H) 02/22/2023    BUN 22 (H) 02/22/2023    CO2 25 02/22/2023    TSH 1.61 11/13/2018    PSA 2.02 08/26/2015    INR 1.1 11/28/2020    HGBA1C 4.8 08/26/2015   ECG        Assessment and plan:    1.  Occasional extrasystoles on EKG. Resolved.         2.  Hypertension.  Adequately controlled on his current medications.  No changes were advised.    3.  Aortic insufficiency.  No change in the murmur on exam.  No symptoms or exam findings of congestive heart failure.    4.  Single kidney status post nephrectomy.  Stable creatinine as of recent laboratory studies.    5.  Gastrointestinal bleeding secondary to aspirin.  No clear etiology found despite aggressive GI workup.  He is off aspirin and his hemoglobin is stable.    6.  History of thyroiditis.  Recent laboratory studies reveal normal TSH.    7.  Status post traumatic hip fracture.  Doing well status post surgical repair.    8.  History of dyslipidemia.  Excellent cholesterol profile in response to his current dietary habits and exercise regimen.    I hope to see him in 12 months in follow-up to today's visit. He is encouraged to call with any questions regarding blood pressure management or other cardiovascular issues.     Timoteo Kim MD  6/23/2023

## 2023-07-05 ENCOUNTER — APPOINTMENT (OUTPATIENT)
Dept: LAB | Facility: CLINIC | Age: 88
End: 2023-07-05
Attending: INTERNAL MEDICINE
Payer: MEDICARE

## 2023-07-05 ENCOUNTER — HOSPITAL ENCOUNTER (OUTPATIENT)
Dept: RADIOLOGY | Facility: CLINIC | Age: 88
Discharge: HOME | End: 2023-07-05
Attending: INTERNAL MEDICINE
Payer: MEDICARE

## 2023-07-05 DIAGNOSIS — M81.0 AGE RELATED OSTEOPOROSIS, UNSPECIFIED PATHOLOGICAL FRACTURE PRESENCE: ICD-10-CM

## 2023-07-05 DIAGNOSIS — I10 BENIGN ESSENTIAL HYPERTENSION: Chronic | ICD-10-CM

## 2023-07-05 LAB
ALBUMIN SERPL-MCNC: 4.3 G/DL (ref 3.5–5.7)
ALP SERPL-CCNC: 68 IU/L (ref 34–104)
ALT SERPL-CCNC: 14 IU/L (ref 7–52)
ANION GAP SERPL CALC-SCNC: 8 MEQ/L (ref 3–15)
AST SERPL-CCNC: 19 IU/L (ref 13–39)
BILIRUB SERPL-MCNC: 0.9 MG/DL (ref 0.3–1)
BUN SERPL-MCNC: 30 MG/DL (ref 7–25)
CALCIUM SERPL-MCNC: 9.1 MG/DL (ref 8.6–10.3)
CHLORIDE SERPL-SCNC: 104 MEQ/L (ref 98–107)
CO2 SERPL-SCNC: 26 MEQ/L (ref 21–31)
CREAT SERPL-MCNC: 1.6 MG/DL (ref 0.7–1.3)
GFR SERPL CREATININE-BSD FRML MDRD: 41 ML/MIN/1.73M*2
GLUCOSE SERPL-MCNC: 82 MG/DL (ref 70–99)
POTASSIUM SERPL-SCNC: 4.9 MEQ/L (ref 3.5–5.1)
PROT SERPL-MCNC: 7.1 G/DL (ref 6.4–8.9)
SODIUM SERPL-SCNC: 138 MEQ/L (ref 136–145)

## 2023-07-05 PROCEDURE — 80053 COMPREHEN METABOLIC PANEL: CPT

## 2023-07-05 PROCEDURE — 36415 COLL VENOUS BLD VENIPUNCTURE: CPT

## 2023-07-05 PROCEDURE — 77080 DXA BONE DENSITY AXIAL: CPT

## 2023-07-05 PROCEDURE — 82652 VIT D 1 25-DIHYDROXY: CPT

## 2023-07-11 LAB
1,25(OH)2D SERPL-MCNC: 29 PG/ML (ref 18–72)
1,25(OH)2D2 SERPL-MCNC: <8 PG/ML
1,25(OH)2D3 SERPL-MCNC: 29 PG/ML

## 2023-07-12 ENCOUNTER — TELEPHONE (OUTPATIENT)
Dept: PRIMARY CARE | Facility: CLINIC | Age: 88
End: 2023-07-12
Payer: MEDICARE

## 2023-07-12 NOTE — TELEPHONE ENCOUNTER
----- Message from Alex Elliott MD sent at 7/10/2023  9:54 PM EDT -----  Please call patient that lab results were good including stable renal function with a creatinine of 1.6.  Thanks

## 2023-07-12 NOTE — TELEPHONE ENCOUNTER
----- Message from Alex Elliott MD sent at 7/12/2023 12:33 PM EDT -----  Recheck with Dr. Hampton noted find out what dose of vitamin D he is taking.  Probably need to double the dose to try to get his vitamin D3 level off.  Thanks

## 2023-07-12 NOTE — TELEPHONE ENCOUNTER
----- Message from Alex Elliott MD sent at 7/12/2023 12:34 PM EDT -----  Please also notify patient that his DEXA scan shows osteopenia.  In the context of vitamin D level being low we would manage this further with increasing vitamin D.  Thanks

## 2023-07-12 NOTE — TELEPHONE ENCOUNTER
Called and spoke with pt. Relayed results.   Pt stated he started taking 1000 units of Vit D less than a week prior to doing his labs. He stopped taking it a few days later because when scheduling his DEXA scan, he was told to stop the Vit D a couple days prior to the DEXA.

## 2023-09-18 ENCOUNTER — OFFICE VISIT (OUTPATIENT)
Dept: PRIMARY CARE | Facility: CLINIC | Age: 88
End: 2023-09-18
Payer: MEDICARE

## 2023-09-18 VITALS
HEIGHT: 64 IN | SYSTOLIC BLOOD PRESSURE: 132 MMHG | BODY MASS INDEX: 24.21 KG/M2 | WEIGHT: 141.8 LBS | OXYGEN SATURATION: 98 % | DIASTOLIC BLOOD PRESSURE: 74 MMHG | RESPIRATION RATE: 16 BRPM | HEART RATE: 74 BPM

## 2023-09-18 DIAGNOSIS — N18.31 STAGE 3A CHRONIC KIDNEY DISEASE (CMS/HCC): ICD-10-CM

## 2023-09-18 DIAGNOSIS — K21.9 GASTROESOPHAGEAL REFLUX DISEASE WITHOUT ESOPHAGITIS: ICD-10-CM

## 2023-09-18 DIAGNOSIS — M81.0 AGE RELATED OSTEOPOROSIS, UNSPECIFIED PATHOLOGICAL FRACTURE PRESENCE: ICD-10-CM

## 2023-09-18 DIAGNOSIS — I10 BENIGN ESSENTIAL HYPERTENSION: Primary | ICD-10-CM

## 2023-09-18 DIAGNOSIS — E78.2 MIXED HYPERLIPIDEMIA: ICD-10-CM

## 2023-09-18 DIAGNOSIS — E83.42 HYPOMAGNESEMIA: ICD-10-CM

## 2023-09-18 PROCEDURE — 99214 OFFICE O/P EST MOD 30 MIN: CPT | Performed by: INTERNAL MEDICINE

## 2023-09-18 RX ORDER — PREDNISONE 10 MG/1
TABLET ORAL
COMMUNITY
Start: 2023-09-14

## 2023-09-18 ASSESSMENT — ENCOUNTER SYMPTOMS
JOINT SWELLING: 0
SPEECH DIFFICULTY: 0
ANAL BLEEDING: 0
POLYDIPSIA: 0
AGITATION: 0
FEVER: 0
ADENOPATHY: 0
HEMATURIA: 0
PALPITATIONS: 0
ABDOMINAL PAIN: 0
CHEST TIGHTNESS: 0
EYE DISCHARGE: 0
DIFFICULTY URINATING: 0
FACIAL ASYMMETRY: 0
COUGH: 0
RECTAL PAIN: 0
FACIAL SWELLING: 0
COLOR CHANGE: 0
HYPERACTIVE: 0
APNEA: 0
SHORTNESS OF BREATH: 0
SEIZURES: 0
CHILLS: 0

## 2023-09-18 NOTE — ASSESSMENT & PLAN NOTE
__________________________________________  CONSOLIDATED ASSESSMENT AND PLAN:  -the below A/P is the consolidated plan for the above conditions:  _____    Hypertension:   --Blood pressure well controlled on current regimen.  He has mild edema from venous insufficiency due to amlodipine but this is stable with right ankle edema which is trace.  Continue current treatment.    Hypercholesterolemia:   --This has been well controlled on 9/14/2023 his LDL was 78.This appears to be a primary prevention strategy but he has done extremely well with it so despite his age of 88 he is opted to continue current treatment.    Chronic kidney disease:   --Creatinine has been stable most recently 1.6.    Osteoporosis:   --He follows with rheumatology, Dr. Polo Booker, and was told to start either Fosamax or Prolia.He did not start these. He notes Fosamax has contraindications with his medications for CKD. 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  Patient hesitant to start on Prolia because he knows once he starts that it cannot be stopped and he knows of a patient/friend who had an adverse drug reaction to it.    GERD:   --Patient only takes Prilosec PRN. He has not needed to take it recently.    By signing my name below, I, Marek Mathew and Shante William, attest that this documentation has been prepared under the direction and in the presence of MD Marek Tinoco and Shante William  9/18/2023 1:40 PM    Initial scribing of the note was done by hardy and final editing and complete note scribing was done by Dr. Elliott.      36 minutes on this date of service was spent by Dr. Elliott  performing the following activities: Previsit medical record review, diagnostic testing review, medical consult report review, obtaining history from patient, performing examination, entering orders, comprehensive medication reconciliation, counseling regarding evaluation  and management of the above conditions as detailed above, and documentation of this visit.

## 2023-09-18 NOTE — PROGRESS NOTES
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 07/05/2023 1441    BUN 30 (H) 07/05/2023 1441    CREATININE 1.6 (H) 07/05/2023 1441        Component Value Date/Time    CALCIUM 9.1 07/05/2023 1441    ALKPHOS 68 07/05/2023 1441    AST 19 07/05/2023 1441    ALT 14 07/05/2023 1441    BILITOT 0.9 07/05/2023 1441            Lab Results   Component Value Date    CHOL 146 09/14/2022    CHOL 158 11/17/2021    CHOL 178 04/05/2021     Lab Results   Component Value Date    HDL 62 09/14/2022    HDL 62 11/17/2021    HDL 80 04/05/2021     Lab Results   Component Value Date    LDLCALC 78 09/14/2022    LDLCALC 87 11/17/2021    LDLCALC 91 04/05/2021     Lab Results   Component Value Date    TRIG 30 09/14/2022    TRIG 47 11/17/2021    TRIG 37 04/05/2021     No results found for: \"CHOLHDL\"    Lab Results   Component Value Date    TSH 1.61 11/13/2018       Lab Results   Component Value Date    HGBA1C 4.8 08/26/2015       No results found for: \"HAV\", \"HEPAIGM\", \"HEPBIGM\", \"HEPBCAB\", \"HBEAG\", \"HEPCAB\"    No results found for: \"MICROALBUR\", \"[id number]\"    Assessment/Plan   1. Benign essential hypertension    2. Gastroesophageal reflux disease without esophagitis    3. Stage 3a chronic kidney disease (CMS/HCC)    4. Mixed hyperlipidemia    5. Hypomagnesemia    6. Age related " osteoporosis, unspecified pathological fracture presence      Problem List Items Addressed This Visit        Circulatory    Benign essential hypertension - Primary (Chronic)    Overview     Overview:          Relevant Orders    Comprehensive metabolic panel       Digestive    Esophageal reflux    Overview     Overview:             Genitourinary    CKD (chronic kidney disease) stage 3, GFR 30-59 ml/min (CMS/HCC) (Chronic)       Musculoskeletal    Age related osteoporosis    Current Assessment & Plan       __________________________________________  CONSOLIDATED ASSESSMENT AND PLAN:  -the below A/P is the consolidated plan for the above conditions:  _____    Hypertension:   --Blood pressure well controlled on current regimen.  He has mild edema from venous insufficiency due to amlodipine but this is stable with right ankle edema which is trace.  Continue current treatment.    Hypercholesterolemia:   --This has been well controlled on 9/14/2023 his LDL was 78.This appears to be a primary prevention strategy but he has done extremely well with it so despite his age of 88 he is opted to continue current treatment.    Chronic kidney disease:   --Creatinine has been stable most recently 1.6.    Osteoporosis:   --He follows with rheumatology, Dr. Polo Booker, and was told to start either Fosamax or Prolia.He did not start these. He notes Fosamax has contraindications with his medications for CKD. 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  Patient hesitant to start on Prolia because he knows once he starts that it cannot be stopped and he knows of a patient/friend who had an adverse drug reaction to it.    GERD:   --Patient only takes Prilosec PRN. He has not needed to take it recently.    By signing my name below, I, Marek Mathew and Shante William, attest that this documentation has been prepared under the direction and in the presence of MD Marek Tinoco  Michelle William  9/18/2023 1:40 PM    Initial scribing of the note was done by hardy and final editing and complete note scribing was done by Dr. Elliott.      36 minutes on this date of service was spent by Dr. Elliott  performing the following activities: Previsit medical record review, diagnostic testing review, medical consult report review, obtaining history from patient, performing examination, entering orders, comprehensive medication reconciliation, counseling regarding evaluation and management of the above conditions as detailed above, and documentation of this visit.              Other    Mixed hyperlipidemia (Chronic)    Hypomagnesemia       Return in about 4 months (around 1/18/2024).    Orders Placed This Encounter   Procedures    Comprehensive metabolic panel     Standing Status:   Future     Standing Expiration Date:   9/18/2024     Order Specific Question:   Release to patient     Answer:   Immediate [1]         Alex Elliott MD  9/18/2023

## 2023-12-04 ENCOUNTER — APPOINTMENT (OUTPATIENT)
Dept: LAB | Facility: CLINIC | Age: 88
End: 2023-12-04
Attending: INTERNAL MEDICINE
Payer: MEDICARE

## 2023-12-04 DIAGNOSIS — I10 BENIGN ESSENTIAL HYPERTENSION: ICD-10-CM

## 2023-12-04 LAB
ALBUMIN SERPL-MCNC: 4.3 G/DL (ref 3.5–5.7)
ALP SERPL-CCNC: 56 IU/L (ref 34–125)
ALT SERPL-CCNC: 17 IU/L (ref 7–52)
ANION GAP SERPL CALC-SCNC: 7 MEQ/L (ref 3–15)
AST SERPL-CCNC: 23 IU/L (ref 13–39)
BILIRUB SERPL-MCNC: 1 MG/DL (ref 0.3–1.2)
BUN SERPL-MCNC: 26 MG/DL (ref 7–25)
CALCIUM SERPL-MCNC: 9.5 MG/DL (ref 8.6–10.3)
CHLORIDE SERPL-SCNC: 106 MEQ/L (ref 98–107)
CO2 SERPL-SCNC: 26 MEQ/L (ref 21–31)
CREAT SERPL-MCNC: 1.4 MG/DL (ref 0.7–1.3)
EGFRCR SERPLBLD CKD-EPI 2021: 48.3 ML/MIN/1.73M*2
GLUCOSE SERPL-MCNC: 103 MG/DL (ref 70–99)
POTASSIUM SERPL-SCNC: 4.7 MEQ/L (ref 3.5–5.1)
PROT SERPL-MCNC: 6.9 G/DL (ref 6–8.2)
SODIUM SERPL-SCNC: 139 MEQ/L (ref 136–145)

## 2023-12-04 PROCEDURE — 80053 COMPREHEN METABOLIC PANEL: CPT

## 2023-12-04 PROCEDURE — 36415 COLL VENOUS BLD VENIPUNCTURE: CPT

## 2023-12-07 ENCOUNTER — TELEPHONE (OUTPATIENT)
Dept: PRIMARY CARE | Facility: CLINIC | Age: 88
End: 2023-12-07
Payer: MEDICARE

## 2023-12-07 NOTE — TELEPHONE ENCOUNTER
----- Message from Alex Elliott MD sent at 12/6/2023  2:41 PM EST -----  Please call Dr. Cardoan and let him know his renal function is stable with the lowest creatinine he has had for a while at 1.4.  Thanks

## 2024-01-01 ENCOUNTER — HOSPITAL ENCOUNTER (EMERGENCY)
Facility: HOSPITAL | Age: 89
Discharge: HOME | End: 2024-01-01
Attending: EMERGENCY MEDICINE | Admitting: EMERGENCY MEDICINE
Payer: MEDICARE

## 2024-01-01 ENCOUNTER — APPOINTMENT (EMERGENCY)
Dept: RADIOLOGY | Facility: HOSPITAL | Age: 89
End: 2024-01-01
Payer: MEDICARE

## 2024-01-01 VITALS
RESPIRATION RATE: 20 BRPM | TEMPERATURE: 98 F | HEART RATE: 82 BPM | OXYGEN SATURATION: 94 % | DIASTOLIC BLOOD PRESSURE: 77 MMHG | SYSTOLIC BLOOD PRESSURE: 122 MMHG

## 2024-01-01 DIAGNOSIS — R10.32 LEFT LOWER QUADRANT ABDOMINAL PAIN: Primary | ICD-10-CM

## 2024-01-01 DIAGNOSIS — K44.9 HIATAL HERNIA: ICD-10-CM

## 2024-01-01 LAB
ALBUMIN SERPL-MCNC: 4.8 G/DL (ref 3.5–5.7)
ALP SERPL-CCNC: 58 IU/L (ref 34–125)
ALT SERPL-CCNC: 17 IU/L (ref 7–52)
ANION GAP SERPL CALC-SCNC: 8 MEQ/L (ref 3–15)
AST SERPL-CCNC: 25 IU/L (ref 13–39)
BASOPHILS # BLD: 0.03 K/UL (ref 0.01–0.1)
BASOPHILS NFR BLD: 0.4 %
BILIRUB SERPL-MCNC: 0.6 MG/DL (ref 0.3–1.2)
BILIRUB UR QL STRIP.AUTO: NEGATIVE MG/DL
BUN SERPL-MCNC: 33 MG/DL (ref 7–25)
CALCIUM SERPL-MCNC: 9.8 MG/DL (ref 8.6–10.3)
CHLORIDE SERPL-SCNC: 107 MEQ/L (ref 98–107)
CLARITY UR REFRACT.AUTO: CLEAR
CO2 SERPL-SCNC: 23 MEQ/L (ref 21–31)
COLOR UR AUTO: YELLOW
CREAT SERPL-MCNC: 1.7 MG/DL (ref 0.7–1.3)
DIFFERENTIAL METHOD BLD: ABNORMAL
EGFRCR SERPLBLD CKD-EPI 2021: 38.3 ML/MIN/1.73M*2
EOSINOPHIL # BLD: 0.08 K/UL (ref 0.04–0.54)
EOSINOPHIL NFR BLD: 1.2 %
ERYTHROCYTE [DISTWIDTH] IN BLOOD BY AUTOMATED COUNT: 14.6 % (ref 11.6–14.4)
GLUCOSE SERPL-MCNC: 96 MG/DL (ref 70–99)
GLUCOSE UR STRIP.AUTO-MCNC: NEGATIVE MG/DL
HCT VFR BLDCO AUTO: 42.1 % (ref 40.1–51)
HGB BLD-MCNC: 13.8 G/DL (ref 13.7–17.5)
HGB UR QL STRIP.AUTO: NEGATIVE
IMM GRANULOCYTES # BLD AUTO: 0.02 K/UL (ref 0–0.08)
IMM GRANULOCYTES NFR BLD AUTO: 0.3 %
KETONES UR STRIP.AUTO-MCNC: NEGATIVE MG/DL
LEUKOCYTE ESTERASE UR QL STRIP.AUTO: NEGATIVE
LIPASE SERPL-CCNC: 38 U/L (ref 11–82)
LYMPHOCYTES # BLD: 2.8 K/UL (ref 1.2–3.5)
LYMPHOCYTES NFR BLD: 40.3 %
MCH RBC QN AUTO: 33.6 PG (ref 28–33.2)
MCHC RBC AUTO-ENTMCNC: 32.8 G/DL (ref 32.2–36.5)
MCV RBC AUTO: 102.4 FL (ref 83–98)
MONOCYTES # BLD: 0.78 K/UL (ref 0.3–1)
MONOCYTES NFR BLD: 11.2 %
NEUTROPHILS # BLD: 3.24 K/UL (ref 1.7–7)
NEUTS SEG NFR BLD: 46.6 %
NITRITE UR QL STRIP.AUTO: NEGATIVE
NRBC BLD-RTO: 0 %
PDW BLD AUTO: 9.4 FL (ref 9.4–12.4)
PH UR STRIP.AUTO: 6 [PH]
PLATELET # BLD AUTO: 160 K/UL (ref 150–350)
POTASSIUM SERPL-SCNC: 4.3 MEQ/L (ref 3.5–5.1)
PROT SERPL-MCNC: 7.6 G/DL (ref 6–8.2)
PROT UR QL STRIP.AUTO: NEGATIVE
RBC # BLD AUTO: 4.11 M/UL (ref 4.5–5.8)
SODIUM SERPL-SCNC: 138 MEQ/L (ref 136–145)
SP GR UR REFRACT.AUTO: 1.02
UROBILINOGEN UR STRIP-ACNC: 0.2 EU/DL
WBC # BLD AUTO: 6.95 K/UL (ref 3.8–10.5)

## 2024-01-01 PROCEDURE — 81003 URINALYSIS AUTO W/O SCOPE: CPT | Performed by: EMERGENCY MEDICINE

## 2024-01-01 PROCEDURE — 83690 ASSAY OF LIPASE: CPT | Performed by: EMERGENCY MEDICINE

## 2024-01-01 PROCEDURE — 99284 EMERGENCY DEPT VISIT MOD MDM: CPT | Mod: 25

## 2024-01-01 PROCEDURE — 85025 COMPLETE CBC W/AUTO DIFF WBC: CPT | Performed by: EMERGENCY MEDICINE

## 2024-01-01 PROCEDURE — 80053 COMPREHEN METABOLIC PANEL: CPT | Performed by: EMERGENCY MEDICINE

## 2024-01-01 PROCEDURE — 85025 COMPLETE CBC W/AUTO DIFF WBC: CPT

## 2024-01-01 PROCEDURE — 36415 COLL VENOUS BLD VENIPUNCTURE: CPT

## 2024-01-01 PROCEDURE — G1004 CDSM NDSC: HCPCS

## 2024-01-01 RX ORDER — METRONIDAZOLE 500 MG/1
500 TABLET ORAL 3 TIMES DAILY
Qty: 21 TABLET | Refills: 0 | Status: SHIPPED | OUTPATIENT
Start: 2024-01-01 | End: 2024-01-08

## 2024-01-01 ASSESSMENT — ENCOUNTER SYMPTOMS
CHILLS: 0
ANOREXIA: 0
ABDOMINAL PAIN: 1
DIARRHEA: 0
HEMATOCHEZIA: 0
CONSTIPATION: 0
DYSURIA: 0
SHORTNESS OF BREATH: 0
HEMATEMESIS: 0
DIAPHORESIS: 0
HEMATURIA: 0
FREQUENCY: 0
FLANK PAIN: 0
FATIGUE: 0
BACK PAIN: 0
FEVER: 0
NAUSEA: 0
VOMITING: 0

## 2024-01-02 NOTE — ED ATTESTATION NOTE
I have personally seen and examined Efrain Cardona Jr..  I was involved in the care and medical decision making for this patient.    I reviewed and agree with physician assistant / nurse practitioner’s assessment and plan of care; any exceptions are documented below.      My focused history, examination, assessment and plan of care of Efrain Cardona Jr. is as follows:    Brief History:  HPI  89-year-old male with wife presents to the emergency department for left lower quadrant pain.  He is a retired internal medicine physician.  Started himself on Cipro a few days ago without relief.  History of diverticulitis.  Prior nephrectomy for renal cancer baseline creatinine 1.6.      Focused Physical Exam:  Physical Exam  Vital signs noted reassuring  Nontoxic  Lungs clear, heart rate normal  Abdomen soft, very mild left lower quadrant tenderness noted  Of note, exam limited as patient evaluated in the hallway secondary to high ED patient volumes      Assessment / Plan / MDM:  MDM  Plan Labs, CT scan of pelvis without contrast secondary to CKD and reevaluation.      I was physically present for the key/critical portions of the following procedures:  Procedures           Ghanshyam Cifuentes DO  01/02/24 9613

## 2024-01-02 NOTE — ED PROVIDER NOTES
Emergency Medicine Note  HPI   HISTORY OF PRESENT ILLNESS       History provided by:  Patient  Abdominal Pain  Pain location:  LLQ  Pain quality: dull    Pain radiates to:  Back  Pain severity:  Moderate  Onset quality:  Gradual  Duration:  5 days  Timing:  Constant  Progression:  Worsening  Chronicity:  New  Context comment:  Began with llq pain 5 days ago.  he started taking cipro thinking it was diverticulitis.  pain increased today with radiation to left back  Relieved by:  Nothing  Worsened by:  Movement  Ineffective treatments: cipro.  Associated symptoms: no anorexia, no chest pain, no chills, no constipation, no diarrhea, no dysuria, no fatigue, no fever, no hematemesis, no hematochezia, no hematuria, no nausea, no shortness of breath and no vomiting          Patient History   PAST HISTORY     Reviewed from Nursing Triage:       Past Medical History:   Diagnosis Date   • Abnormal EKG    • Arrhythmia    • Chronic kidney disease    • Disease of thyroid gland        • Diverticulitis of colon     Diverticulosis   • GI (gastrointestinal bleed)     X3   last    • Heart murmur    • Hyperlipidemia    • Hypertension    • Infectious viral hepatitis    • Lipid disorder    • Prostate enlargement    • Renal failure        Past Surgical History:   Procedure Laterality Date   • HERNIA REPAIR      2001   • HIP FRACTURE SURGERY Left 2020   • KIDNEY SURGERY Left     kidney cancer       Family History   Problem Relation Age of Onset   • Lung cancer Biological Mother    • Leukemia Biological Father        Social History     Tobacco Use   • Smoking status: Former     Types: Cigarettes     Quit date: 1970     Years since quittin.0   • Smokeless tobacco: Never   • Tobacco comments:     PIPE    Vaping Use   • Vaping Use: Never used   Substance Use Topics   • Alcohol use: Yes     Comment: [place]/daily   • Drug use: No         Review of Systems   REVIEW OF SYSTEMS     Review of Systems   Constitutional:  Negative for chills, diaphoresis, fatigue and fever.   Respiratory: Negative for shortness of breath.    Cardiovascular: Negative for chest pain.   Gastrointestinal: Positive for abdominal pain. Negative for anorexia, constipation, diarrhea, hematemesis, hematochezia, nausea and vomiting.   Genitourinary: Negative for dysuria, flank pain, frequency, hematuria and urgency.   Musculoskeletal: Negative for back pain.   Skin: Negative for rash.         VITALS     ED Vitals    Date/Time Temp Pulse Resp BP SpO2 [facility]   01/01/24 2235 -- 82 20 122/77 94 % ESB   01/01/24 1822 36.7 °C (98 °F) 81 18 150/75 95 % GC        Pulse Ox %: 95 % (01/01/24 1927)  Pulse Ox Interpretation: Normal (01/01/24 1927)           Physical Exam   PHYSICAL EXAM     Physical Exam  Vitals and nursing note reviewed.   Constitutional:       Appearance: Normal appearance. He is normal weight.   HENT:      Head: Normocephalic.   Eyes:      General: No scleral icterus.     Conjunctiva/sclera: Conjunctivae normal.   Cardiovascular:      Rate and Rhythm: Normal rate and regular rhythm.      Heart sounds: Normal heart sounds.   Pulmonary:      Effort: Pulmonary effort is normal.      Breath sounds: Normal breath sounds. No wheezing or rales.   Abdominal:      Palpations: Abdomen is soft.      Tenderness: There is abdominal tenderness in the left lower quadrant. There is no right CVA tenderness, left CVA tenderness, guarding or rebound.   Skin:     General: Skin is warm.   Neurological:      Mental Status: He is alert.   Psychiatric:         Mood and Affect: Mood normal.           PROCEDURES     Procedures     DATA     Results     Procedure Component Value Units Date/Time    UA w/ reflex culture (ED Only) [[id number]]  (Normal) Collected: 01/01/24 2126    Specimen: Urine, Clean Catch Updated: 01/01/24 2132    Narrative:      The following orders were created for panel order UA w/ reflex culture (ED Only).  Procedure                               Abnormality          Status                     ---------                               -----------         ------                     UA Reflex to Culture (Ma...[[id number]]  Normal              Final result                 Please view results for these tests on the individual orders.    UA Reflex to Culture (Macroscopic) [[id number]]  (Normal) Collected: 01/01/24 2126    Specimen: Urine, Clean Catch Updated: 01/01/24 2132     Color, Urine Yellow     Clarity, Urine Clear     Specific Gravity, Urine 1.019     pH, Urine 6.0     Leukocyte Esterase Negative     Comment: Results can be falsely negative due to high specific gravity, some antibiotics, glucose >3 g/dl, or WBC other than neutrophils.        Nitrite, Urine Negative     Protein, Urine Negative     Glucose, Urine Negative mg/dL      Ketones, Urine Negative mg/dL      Urobilinogen, Urine 0.2 EU/dL      Bilirubin, Urine Negative mg/dL      Blood, Urine Negative     Comment: The sensitivity of the occult blood test is equivalent to approximately 4 intact RBC/HPF.       Comprehensive metabolic panel [[id number]]  (Abnormal) Collected: 01/01/24 1826    Specimen: Blood, Venous Updated: 01/01/24 1938     Sodium 138 mEQ/L      Potassium 4.3 mEQ/L      Comment: Results obtained on plasma. Plasma Potassium values may be up to 0.4 mEQ/L less than serum values. The differences may be greater for patients with high platelet or white cell counts.        Chloride 107 mEQ/L      CO2 23 mEQ/L      BUN 33 mg/dL      Creatinine 1.7 mg/dL      Glucose 96 mg/dL      Calcium 9.8 mg/dL      AST (SGOT) 25 IU/L      ALT (SGPT) 17 IU/L      Alkaline Phosphatase 58 IU/L      Total Protein 7.6 g/dL      Comment: Test performed on plasma which typically contains approximately 0.4 g/dL more protein than serum.        Albumin 4.8 g/dL      Bilirubin, Total 0.6 mg/dL      eGFR 38.3 mL/min/1.73m*2      Comment: Calculation based on the Chronic Kidney Disease Epidemiology Collaboration (CKD-EPI) equation refit  without adjustment for race.        Anion Gap 8 mEQ/L     Lipase, if pain is above umbilicus [[id number]]  (Normal) Collected: 01/01/24 1826    Specimen: Blood, Venous Updated: 01/01/24 1918     Lipase 38 U/L     CBC and differential [[id number]]  (Abnormal) Collected: 01/01/24 1826    Specimen: Blood, Venous Updated: 01/01/24 1857     WBC 6.95 K/uL      RBC 4.11 M/uL      Hemoglobin 13.8 g/dL      Hematocrit 42.1 %      .4 fL      MCH 33.6 pg      MCHC 32.8 g/dL      RDW 14.6 %      Platelets 160 K/uL      MPV 9.4 fL      Differential Type Auto     nRBC 0.0 %      Immature Granulocytes 0.3 %      Neutrophils 46.6 %      Lymphocytes 40.3 %      Monocytes 11.2 %      Eosinophils 1.2 %      Basophils 0.4 %      Immature Granulocytes, Absolute 0.02 K/uL      Neutrophils, Absolute 3.24 K/uL      Lymphocytes, Absolute 2.80 K/uL      Monocytes, Absolute 0.78 K/uL      Eosinophils, Absolute 0.08 K/uL      Basophils, Absolute 0.03 K/uL           Imaging Results          CT ABDOMEN PELVIS WITHOUT IV CONTRAST (Final result)  Result time 01/01/24 21:56:55    Final result                 Impression:    IMPRESSION:  1. Colonic diverticulosis without evidence of acute diverticulitis.  2. Increase in large hiatal hernia containing numerous upper abdominal contents  as discussed below.  3. Stable fusiform aortobiiliac ectasia.    COMMENT:  LOWER CHEST: Bilateral lower lobe subsegmental atelectasis without consolidation  or pleural effusion. Extensive coronary artery atherosclerotic calcification.  There is also aortic annular as well as moderate to severe aortic valvular  calcification.    ABDOMEN:  LIVER: A few scattered hepatic cysts again noted, largest now measuring 4 cm in  the right hepatic lobe.  BILE DUCTS: normal caliber.  GALLBLADDER: no calcified gallstones. Normal caliber wall.  PANCREAS: Body is involved in a large hiatal hernia, new compared to 2015.  SPLEEN: within normal limits.  ADRENALS: within normal  limits.  KIDNEYS: Patient is again status post left nephrectomy. Right kidney is normal.    PELVIS: Evaluation is limited due to left hip fracture fixation artery streak  artifact.  REPRODUCTIVE ORGANS: Mildly enlarged heterogeneous prostate.  URETERS: within normal limits.  BLADDER: within normal limits.    BOWEL/PERITONEUM: Bowel demonstrates normal caliber without evidence of  obstruction. Scattered colonic diverticulosis without evidence of acute  diverticulitis. Normal appendix. There is a large diaphragmatic hernia, overall  increased in size, with the fascial defect now measuring approximately 5.2 x 5.1  cm in AP and transverse dimensions respectively, now containing essentially the  entire transverse colon, the entire stomach in a slightly organoaxial  configuration, proximal duodenum, as well as portion of the proximal pancreatic  body. No enlarged mesenteric lymph nodes. No ascites or free air, no fluid  collection.    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  RETROPERITONEUM: no enlarged retroperitoneal or pelvic nodes.  ABDOMINAL WALL: Small fat-containing right inguinal hernia.  BONES: degenerative changes, no suspicious lytic or blastic lesions. 3 left  femoral neck lag screws identified for left femoral neck subcapital fracture  fixation, with associated streak artifact.                 Narrative:      CLINICAL HISTORY: LLQ abdominal pain    COMPARED WITH: Abdominopelvic CT dated 09/22/15.    STUDY: CT of the Abdomen and Pelvis was performed without contrast per ordering  physician's request. One or more dose reduction techniques (e.g., Automated  exposure control, adjustment of the mA and/or kV according to the patient size,  use of iterative reconstruction technique) utilized for this examination.                                 No orders to display       Scoring tools                                  ED Course & MDM   MDM / ED COURSE / CLINICAL IMPRESSION / DISPO     MDM    ED Course as of 01/02/24 2131 Mon Jan 01, 2024 1953 Comprehensive metabolic panel(!)  stable [RS]   1953 CBC and differential(!)  stable [RS]   2102 Case signed out to night shift dispo pending CT results [RS]   2216 CT ABDOMEN PELVIS WITHOUT IV CONTRAST  IMPRESSION:  1. Colonic diverticulosis without evidence of acute diverticulitis.  2. Increase in large hiatal hernia containing numerous upper abdominal contents  as discussed below.  3. Stable fusiform aortobiiliac ectasia. [PT]   2217 Patient well-appearing here, made aware of results of CT.  He is aware of his large hiatal hernia. [PT]   2217   I did encourage him to follow-up with surgery for this.  He asks for a prescription for Flagyl to add to his Cipro which I will send to his pharmacy.  He was given return precautions. [PT]      ED Course User Index  [PT] Ghanshyam Cifuentes, DO  [RS] Brendan Meier PA C     Clinical Impression      Left lower quadrant abdominal pain   Hiatal hernia     _________________     ED Disposition   Discharge                   Brendan Meier PA C  01/01/24 2106       Brendan Meier PA C  01/02/24 2131

## 2024-01-02 NOTE — DISCHARGE INSTRUCTIONS
Continue home medications.  Take the Flagyl as instructed, continue the Cipro as taken previously.  Please follow-up with your primary doctor and general surgery for further evaluation of your symptoms, including your large hiatal hernia.  Return to the emergency department for any worsening pain, fever, chest discomfort, difficulty breathing, failure to improve, or any other concerns.

## 2024-01-10 ENCOUNTER — TELEPHONE (OUTPATIENT)
Dept: PRIMARY CARE | Facility: CLINIC | Age: 89
End: 2024-01-10
Payer: MEDICARE

## 2024-01-10 NOTE — TELEPHONE ENCOUNTER
"ED discharge follow up  Pt to the ED with left lower quadrant abdominal pain that radiates to left back; has had for 5 days and has increased; found to have a large hiatal hernia and encouraged to follow up with a surgeon.  Called and spoke with the pt who stated, \"I'm doing just great, Mike is back\"; denied any more pain; no questions at this time and does not want a follow up appt with Dr Hall. Pt has an appt scheduled with Dr hall on 1/15/24, 4 month follow up.    "

## 2024-01-15 ENCOUNTER — OFFICE VISIT (OUTPATIENT)
Dept: PRIMARY CARE | Facility: CLINIC | Age: 89
End: 2024-01-15
Payer: MEDICARE

## 2024-01-15 VITALS
RESPIRATION RATE: 16 BRPM | HEART RATE: 68 BPM | HEIGHT: 64 IN | WEIGHT: 147 LBS | OXYGEN SATURATION: 97 % | SYSTOLIC BLOOD PRESSURE: 138 MMHG | DIASTOLIC BLOOD PRESSURE: 74 MMHG | BODY MASS INDEX: 25.1 KG/M2

## 2024-01-15 DIAGNOSIS — E78.2 MIXED HYPERLIPIDEMIA: ICD-10-CM

## 2024-01-15 DIAGNOSIS — I35.1 NONRHEUMATIC AORTIC VALVE INSUFFICIENCY: Chronic | ICD-10-CM

## 2024-01-15 DIAGNOSIS — E83.42 HYPOMAGNESEMIA: ICD-10-CM

## 2024-01-15 DIAGNOSIS — K21.9 GASTROESOPHAGEAL REFLUX DISEASE WITHOUT ESOPHAGITIS: ICD-10-CM

## 2024-01-15 DIAGNOSIS — N18.31 STAGE 3A CHRONIC KIDNEY DISEASE (CMS/HCC): ICD-10-CM

## 2024-01-15 DIAGNOSIS — R06.09 DYSPNEA ON EXERTION: Primary | ICD-10-CM

## 2024-01-15 DIAGNOSIS — M81.0 AGE RELATED OSTEOPOROSIS, UNSPECIFIED PATHOLOGICAL FRACTURE PRESENCE: ICD-10-CM

## 2024-01-15 DIAGNOSIS — I10 BENIGN ESSENTIAL HYPERTENSION: ICD-10-CM

## 2024-01-15 DIAGNOSIS — K44.9 LARGE HIATAL HERNIA: ICD-10-CM

## 2024-01-15 PROCEDURE — 99215 OFFICE O/P EST HI 40 MIN: CPT | Performed by: INTERNAL MEDICINE

## 2024-01-15 ASSESSMENT — ENCOUNTER SYMPTOMS
CHILLS: 0
SPEECH DIFFICULTY: 0
HEMATURIA: 0
EYE DISCHARGE: 0
AGITATION: 0
DIFFICULTY URINATING: 0
FACIAL ASYMMETRY: 0
ABDOMINAL PAIN: 0
ANAL BLEEDING: 0
HYPERACTIVE: 0
SHORTNESS OF BREATH: 0
FEVER: 0
JOINT SWELLING: 0
PALPITATIONS: 0
ADENOPATHY: 0
COUGH: 0
RECTAL PAIN: 0
SEIZURES: 0
COLOR CHANGE: 0
APNEA: 0
POLYDIPSIA: 0
CHEST TIGHTNESS: 0
FACIAL SWELLING: 0

## 2024-01-15 NOTE — ASSESSMENT & PLAN NOTE
__________________________________________  CONSOLIDATED ASSESSMENT AND PLAN:  -the below A/P is the consolidated plan for the above conditions:  _____  Dyspnea on exertion and follow-up of ED visit:   -- Patient seen for left lower quadrant pain but pain resolved while in the ED.  No clear cause for this was identified.  Flagyl added to Cipro to complete a course for diverticulitis which was not seen as an acute finding on the CAT scan.  No recurrent left lower quadrant pain since that time however he has had dyspnea on exertion and a CAT scan showed that he has transverse colon and stomach herniating up into thoracic cavity on the CT scan due to a very large hiatal hernia.  Question whether this is the source of his dyspnea on exertion as described above.  We discussed that this may be very likely.  Recommend GI evaluation and he will contact Dr. Ramires to run it by him and see if he is able to see him in the office.  He knows him personally from his years at [facility].  Less likely that his dyspnea on exertion is heart failure related.  Echocardiogram not done and apparently 6 years so this was ordered at today's visit and he will coordinate this with follow-up with his cardiologist Dr. Francis who sees him for aortic insufficiency.    Hypertension:   -- Blood pressure well-controlled on current regimen.  Continue current treatment.    Hypercholesterolemia:   -- Stable.  Due for lipid profile check with other labs ordered at today's visit.  Continue current treatment at this time.    Chronic kidney disease:   -- Renal function stable.  Check again in March.    GERD:   -- As above, patient with large hiatal hernia with herniation of stomach and transverse colon possibly with dyspnea is the only major symptom.  He does not have GERD or dyspepsia symptoms at this time.  Plan as above.    By signing my name below, I, Marek Mathew, attest that this documentation has been prepared under the direction and  in the presence of MD Marek Tinoco  1/15/2024 10:23 AM     Initial scribing of the note was done by hardy and final editing and complete note scribing was done by Dr. Elliott.        44  minutes on this date of service was spent by Dr. Elliott  performing the following activities: Previsit medical record review, diagnostic testing review, medical consult report review, obtaining history from patient, performing examination, entering orders, comprehensive medication reconciliation, counseling regarding evaluation and management of the above conditions as detailed above, patient was brought to the computer and the actual CAT scan films showing herniation of transverse colon and stomach were shown to him and discussion regarding what is possibly causing the symptoms of dyspnea with regard to the CT scan, and documentation of this visit.

## 2024-01-15 NOTE — PROGRESS NOTES
"       Alex Elliott MD  Geriatric Medicine                                                    GERIATRIC MEDICINE    Subjective      Patient ID: Efrain Cardona Jr. is a 89 y.o. male.    Disease Management Visit    The patient is here for an acute problem and for disease Management for evaluation and management of the following chronic medical problems.    This includes: symptom review, medication regimen review, laboratory monitoring, and review of diagnostic testing.  _____________________________________________________________________  1/15/24 ED follow-up and interval disease management update:  --ED visit: Pt was in the ED on 1/1/24 for LLQ abdominal pain. ED noted \"Colonic diverticulosis without evidence of acute diverticulitis, Increase in large hiatal hernia containing numerous upper abdominal contents as discussed below. Stable fusiform aortobiiliac ectasia. Patient well-appearing here, made aware of results of CT.  He is aware of his large hiatal hernia. I did encourage him to follow-up with surgery for this.  The ED physician provided a prescription for Flagyl to add to his Cipro which I will send to his pharmacy.   Patient's pain resolved while he was in the emergency room.  It was more acute in onset pain that brought him to the ER in order to have an abscess ruled out.  He is convinced that this may have been acute diverticulitis that had been largely treated with Cipro and the acute pain was due to something not identified.  He did finish the Cipro and Flagyl combination.  CT scan at that time showed that his very large hiatal hernia is larger than it had been seen and resulted in the herniation of numerous upper abdominal organs into the thoracic cavity.  This included the entire transverse colon and the entire stomach.  The proximal duodenum and a portion of the proximal pancreatic body was also herniated.  Patient has felt well since that time except that on Saturday 1/13 and Sunday, 1/14/2024 " he noted dyspnea on exertion.  This had resolved today and was able to exercise today without any exercise intolerance.  He questions whether he had acute worsening of his hiatal hernia with above herniation of organs into the thoracic cavity on the left contributing or causing his dyspnea on exertion.  Of note he has not had GERD or reflux symptoms or other symptoms of GERD.  He has not had recent GI follow-up to further evaluate this possibility.  Of note the CAT scan also showed extensive coronary artery calcification but he has had no symptoms suggestive of ischemia  --Hypertension:   Blood pressure well controlled on current regimen.  He has mild edema from venous insufficiency due to amlodipine but this is stable with right ankle edema which is trace.  --Hypercholesterolemia:   This has been well controlled on 9/14/2023 his LDL was 78.  This appears to be a primary prevention strategy but he has done extremely well with it so despite his age of 88 he is opted to continue current treatment.  --Chronic kidney disease:   Creatinine has been stable most recently on 7/5/2023 at 1.6  --Osteoporosis:   Had DEXA scan and condition is roughly stable. Levels are in Osteopenia range. He follows with rheumatology, Dr. Polo Booker, and was told to start either Fosamax or Prolia. He did not start these. He notes Fosamax has contraindications with his medications for CKD. Pt notes a previous fall where his hip had screws implanted. No other falls noted. 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 He has lost 4 inches of height due to scoliosis.  We again discussed x-ray findings and follow-up DEXA scan results which was performed on 7/5/2023.T-scores at lumbar spine was +0.4 and right hip was -2.1.  FRAX score results were 12.0% for major osteoporotic fracture at 10 years and 5.2% for hip fracture at 10 years.  --GERD:   Patient only takes Prilosec PRN. He has not needed to take it  recently.  --Comprehensive medication reconciliation was performed at today's visit.    Medication reconciliation:  Lisinopril 20 mg twice daily    -dose reduction at 3/8/2021 visit  Norvasc 5 mg   2.5 mg in the morning/5 mg in the evening = 7.5 mg daily  Simvastatin 40 mg daily  Proscar 5 mg daily  Mag-Ox 500 mg daily 3 days/week  --Note: He takes aspirin 325 mg 3 tablets 1 day/week prior to shooting i2[employer] Water.  We had previously discussed the small risk this poses.  _________________________  _________________________    09/18/23 interval disease management update:  --Hypertension: Blood pressure well controlled on current regimen.  He has mild edema from venous insufficiency due to amlodipine but this is stable with right ankle edema which is trace.  --Hypercholesterolemia: This has been well controlled on 9/14/2023 his LDL was 78.  This appears to be a primary prevention strategy but he has done extremely well with it so despite his age of 88 he is opted to continue current treatment.  --Chronic kidney disease: Creatinine has been stable most recently on 7/5/2023 at 1.6  --Osteoporosis: Had DEXA scan and condition is roughly stable. Levels are in Osteopenia range. He follows with rheumatology, Dr. Polo Booker, and was told to start either Fosamax or Prolia. He did not start these. He notes Fosamax has contraindications with his medications for CKD. Pt notes a previous fall where his hip had screws implanted. No other falls noted. 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 He has lost 4 inches of height due to scoliosis.   We again discussed x-ray findings and follow-up DEXA scan results which was performed on 7/5/2023.  T-scores at lumbar spine was +0.4 and right hip was -2.1.  FRAX score results were 12.0% for major osteoporotic fracture at 10 years and 5.2% for hip fracture at 10 years.  --GERD: Patient only takes Prilosec PRN. He has not needed to take it  recently.  --Influenza Vaccination counseling was discussed. Pt will get vaccine at a later date. COVID 19 vaccine consultation was discussed. Should be expected within next week.   --Comprehensive medication reconciliation was performed at today's visit.    05/15/23 interval disease management update:  --Hypertension: Blood pressure well controlled on current regimen.  He has mild edema from venous insufficiency due to amlodipine but this is stable with right ankle edema which is trace.  --Hypercholesterolemia: This has been well controlled on 9/14/2023 his LDL was 78.  This appears to be a primary prevention strategy but he has done extremely well with it so despite his age of 88 he is opted to continue current treatment.  --Chronic kidney disease: Creatinine has been stable most recently on 2/22/2023 1.5.  --Osteoporosis: He has lost about 4 inches of height. DEXA scan 4 years ago showed mild early osteoporosis. He followed with rheumatology, Dr. Polo Booker, and was told to start either Fosamax or Prolia. He did not start these. He notes Fosamax has contraindications with his medications, but he does not know which medications.  --GERD: Patient only takes Prilosec PRN. He has not needed to take it recently.  --Comprehensive medication reconciliation was performed at today's visit.    2/27/2023 interval disease management update:  -- Hypertension: Patient's blood pressure stable on current regimen.  Continuing on current treatment.  He reports his home blood pressure monitor occasionally goes up to 144 but averaging in the 120s to 130s.  -- Chronic kidney disease: Creatinine stable on 2/22/2023 creatinine stable at 1.5.  -- Hypercholesterolemia: Lipid numbers excellent in September.  We will check them again following next visit.  -- BPH: Stable on Proscar.  Continue current treatment.  -- Prevention: Patient asked whether he is due for a pneumonia booster vaccine.  He had a second booster Pneumovax 23 in 2011 and  had Prevnar 13 in 2015 so we discussed that he has done pneumonia vaccination for life.  -- Comprehensive medication reconciliation was performed at today's visit.      -----------------------------  Previous visits and chronic problem review:    Hypertension  --- Update 11/9/2020: Blood pressure is now well controlled his hydrochlorothiazide was stopped due to rising creatinine up to 2.6.  Creatinine is now down to 1.3.  --- Update 7/8/2020: Blood pressure is low as above.  Blood pressure medications put on hold.  --- Update 3/11/2020: Blood pressure stable.  His cardiologist dropped his Toprol.  Hydrochlorothiazide was increased to daily dosing.  This simplified his regimen.  He is well controlled at home he is running 110-130/80  --- Update 11/6/2019: Patient is concerned because he has hypertension on morning blood pressure check in the range of 160/90 but by afternoon it is down to 120/80.  At today's visit he is 136/80.  His cardiologist had previously addressed this concern and was not concerned about it.  --- Update 7/10/2019: Stable on current regimen.  Note he is successfully weaned off Norvasc with addition of hydrochlorothiazide and his blood pressure is tolerating hydrochlorothiazide dose reduction to 12.5 g daily  --- Update 3/4/18: Blood pressure stable with medication change with reduction of Norvasc to 5 mg in addition of hydrochlorothiazide 25 mg Monday Wednesday Friday.  --- Update 6/25/18: Stable.  ---update 2/26/18: Stable; still occasional edema on hot days from amlodipine but he is tolerating it.  ---Update 10/30/17: home BP stable.  --- Update 7/5/17: Home BP tracking stable--however he reports some variability  --- Previous visits: BP is stable on dose reduction of Norvasc and a small dose of Toprol --summertime edema from Norvasc.  --- Previous visit:Patient notes that his home blood pressure monitoring is all from 10-20 points higher than what we check in the office.  He reports running in  the systolic range of 125-140 at home.  --- Patient asked about switching from Vasotec to lisinopril do to major cost advantages.  He also notes summertime edema from Norvasc.  Medications       ((( Toprol 12.5 mg daily )))   -by cardiologist 1/31/2020  Norvasc 5 mg daily   Lisinopril 40 mg daily      ((( Hydrochlorothiazide 12.5 mg   Daily )))       (((   ASA   )))  --stopped due to study from [place] recommending against ASA for primary prevention    Anemia  -He has recurrent iron deficiency anemia.  --- Update 11/9/2020: Most recent hemoglobin was excellent at 12.0 on 7/17/2020  --- Update 7/8/2020: Most recent hemoglobin was normal on 1/31/2020 at 12.5.  --- 3/11/2020: This is resolved with most recent hemoglobin on 1/31/2020 of 12.5  --- Update 11/6/2019: Hemoglobin was normal at 12.7 on most recent check which was 9/18/2019.  --- Date 7/10/2019: Hemoglobin on 4/18/2019 was normal at 13.5.  No evidence of recurrent GI bleeding.  --- Update 3/4/19: Hemoglobin was normal on last check 11/13/18 at 14.7  --- Update 6/25/18: Stable.  Recent hemoglobin has been normal most recently 3/20/18 was 14.5.  This is normalized since he stopped aspirin.  ---update 2/26/18: EGD done by GI this summer was negative.He is due for follow-up CBC next month.  Already ordered.  --- Update 7/5/17: Patient has had no signs of GI bleeding.  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  ---His recent hemoglobin was 9/19/17 Hgb 14.3  -off ASA  2/15/17 hemoglobin was 13.1  8/18/16 was 14.1  5/25/16 was 12.8  12/30/15 was 14.1  6/30/15 was 13.9  ---Previous visit:  He has since stopped his iron for his capsule endoscopy study which was done approximately 3 weeks ago to evaluate colonic and gastric AVMs as well as the small bowel for source of blood loss.----Patient reports that his capsule endoscopy study was negative. GI recommended stopping aspirin.  ---His gastroenterologist feels this is most likely do  to either gastric or colonic AVMs  Last EGD and colonoscopy were done in July 2014.    Hypercholesterolemia  -Goal of treatment is LDL around 100  Primary prevention strategy  Last lipids  --- Update 3/11/2020: Most recent lipid profile on 7/17/2020: Total cholesterol 131 HDL 55 LDL 67  1/31/2020 was excellent: Total cholesterol 143 HDL 64 LDL 72  4/18/2019: Total cholesterol 172 HDL 52   9/19/17:    HDL 71  LDL 92  2/15/17: Total cholesterol 151 HDL 69 LDL 77  5/25/16: Total cholesterol 168 HDL 72 LDL 81  8/26/15: Total cholesterol 198 HDL 71   11/24/14:    HDL 79  LDL 84  Medications  Zocor 40 mg daily    GERD  --- Update 11/9/2020: Stable now on as needed regimen.  --- Update 7/8/2020: Stable  --- Update 11/6/2019: Patient reports that he takes his PPI about 5 days/week.  In light of his osteoporosis and hypomagnesemia we discussed possibly switching to an H2 blocker such as ranitidine.  We discussed recommended trial of ranitidine 150 mg daily at bedtime.  He may check with his GI specialist Dr. Rollins about this recommendation.  As above he has 2 of the complications that PPI cause including accelerated osteoporosis and hypomagnesemia.  Medications  Prilosec 20 mg daily as needed    BPH  Medication  Proscar 5 mg daily    Gout  --- Update 11/9/2020: Allopurinol was stopped as his hydrochlorothiazide which was the likely trigger of his gout attacks was also stopped.  --- Update 7/8/2020: Yesterday on 7/7/2020 his nephrologist recommended reducing allopurinol to 100 mg daily.  He ordered a uric acid follow-up blood test.  --- Date 3/11/20: Uric acid remains less than 6 on hydrochlorothiazide.  --- Update 11/6/2019: Stable without recent attack  --- Update 7/10/2019: Patient is tolerating hydrochlorothiazide without any episodes of gout.  --- Update 2/26/18: No recent attacks of gout  Last uric acid 9/19/17 was 4.6  6/30/15 was 4.1  Medications       ((( Allopurinol 100 mg daily                       -reduction 7/7/2020  )))  (Prednisone 20-30 mg when necessary with a 2-3 day taper for acute attacks--- patient initiates treatment himself with this regimen)    Chronic kidney disease  ---s/p Left Nephrectomy 2006  -Transitional Cell Ca  Creatinine ranges 1.4-1.8  --- Update 11/9/2020: Most recent creatinine off hydrochlorothiazide is excellent at 1.3 on 7/17/2020.  --- Update 7/8/2020:  Most recent creatinine on 1/2/2020 creatinine had risen to 2.6.  Seen by nephrology 7/7/2020 and felt to be volume contraction due to dehydration from diuretic.  All antihypertensive meds and diuretic were discontinued yesterday.  Lab testing was ordered by his nephrologist for 2 weeks from now.  9/18/2019 was stable at 1.7  5/16/2019 was stable at 1.8  11/13/18 was 1.3  -this is the best it has been in years.  3/20/18 was 1.5  6/29/17 was 1.6  2/15/17 was 1.6  8/18/16 was 1.6  5/25/16 was 1.7  12/30/15 was 1.4  4/1/15  was 1.6    History of colon polyp    History of intermittent diverticulitis  -Treated with Augmentin or goes to Cipro/Flagyl if persistent symptoms    Low back pain  -Chronic degenerative scoliosis    Hypomagnesemia  --Patient takes an OTC magnesium supplement.  -------------------    Acute problem August 2016 -- diplopia  Progress note:  Over the last 6 months she has had at least 3 episodes of very brief diplopia lasting 1-2 minutes.  He reports if he shuts either eye the sensation is eliminated. With both eyes open it occurs.  This resolves spontaneously within 2 minutes.  Patient denies any other associated neurological symptoms including: He has not had weakness, fatigue, dizziness, speech trouble, etc....  Follow-up: Patient saw his ophthalmologist who examined him and ordered an MRI of the brain which was unremarkable and carotid Doppler study which was normal. He suspected that he had transient extraocular muscle weakness for unclear reasons.  He has not had a recurrence of his symptoms since July  2016.    Hosp ER Eval  9/22/15:  non-spec ABD Pain w normal CT ABD.    -------------------    Preventive health care and Screening summary:   UPDATE  3/6/17  Colonoscopy  -2014  Flu vaccine  ;  HD  -pharm 12/16/15 for HD; high-dose 16  Pneumovax  Booster ; Prevnar 13 given 2/23/15  dT Booster  Tdap 2012  Zostavax  --patient did not get this from the pharmacy as ordered and now he wants to await the second generation Zostavax from a different  which may have better efficacy--possibly coming later in 2017  PSA  -2.02  8/26/15--note patient is now over the age of 81 so that screening PSA no longer recommended      The following have been reviewed and updated as appropriate in this visit:          Past Medical History:   Diagnosis Date   • Abnormal EKG    • Arrhythmia    • Chronic kidney disease    • Disease of thyroid gland        • Diverticulitis of colon     Diverticulosis   • GI (gastrointestinal bleed)     X3   last    • Heart murmur    • Hyperlipidemia    • Hypertension    • Infectious viral hepatitis    • Lipid disorder    • Prostate enlargement    • Renal failure        Past Surgical History:   Procedure Laterality Date   • HERNIA REPAIR      2001   • HIP FRACTURE SURGERY Left 2020   • KIDNEY SURGERY Left     kidney cancer       Family History   Problem Relation Age of Onset   • Lung cancer Biological Mother    • Leukemia Biological Father        Social History     Socioeconomic History   • Marital status:      Spouse name: Not on file   • Number of children: 4   • Years of education: Not on file   • Highest education level: Not on file   Occupational History   • Not on file   Tobacco Use   • Smoking status: Former     Types: Cigarettes     Quit date: 1970     Years since quittin.0   • Smokeless tobacco: Never   • Tobacco comments:     PIPE    Vaping Use   • Vaping Use: Never used   Substance and Sexual Activity   • Alcohol use: Yes      Comment: [place]/daily   • Drug use: No   • Sexual activity: Not Currently   Other Topics Concern   • Not on file   Social History Narrative   • Not on file     Social Determinants of Health     Financial Resource Strain: Not on file   Food Insecurity: Not on file   Transportation Needs: Not on file   Physical Activity: Not on file   Stress: Not on file   Social Connections: Not on file   Intimate Partner Violence: Not on file   Housing Stability: Not on file       Allergies   Allergen Reactions   • Griseofulvin      Other reaction(s): UNKNOWN  fulvicin    • Tetracycline Rash and Other (see comments)     minocin        Current Outpatient Medications   Medication Sig Dispense Refill   • amLODIPine (NORVASC) 5 mg tablet Take 7.5 mg by mouth daily.     • diphenoxylate-atropine (LOMOTIL) 2.5-0.025 mg per tablet Take 1 tablet by mouth daily as needed for diarrhea. 30 tablet 1   • finasteride (PROSCAR) 5 mg tablet Take 5 mg by mouth daily.     • lisinopriL (PRINIVIL) 40 mg tablet Take 80 mg by mouth daily.     • predniSONE (DELTASONE) 10 mg tablet take 1 tablet by mouth twice a day as directed     • psyllium husk (METAMUCIL ORAL) Take 2 capsules by mouth daily. Pt is unaware of dosage     • sildenafiL, pulm.hypertension, 20 mg tablet take 5 tablets by mouth as directed if needed     • simvastatin (ZOCOR) 40 mg tablet Take 1 tablet by mouth daily.  0     No current facility-administered medications for this visit.       Review of Systems   Constitutional: Negative for chills and fever.   HENT: Positive for hearing loss. Negative for facial swelling.    Eyes: Negative for discharge.   Respiratory: Negative for apnea, cough, chest tightness and shortness of breath.    Cardiovascular: Positive for leg swelling. Negative for chest pain and palpitations.        Palpitations and premature beats have resolved completely it appears.   Gastrointestinal: Negative for abdominal pain, anal bleeding and rectal pain.   Endocrine:  Negative for heat intolerance and polydipsia.   Genitourinary: Negative for decreased urine volume, difficulty urinating and hematuria.   Musculoskeletal: Negative for joint swelling.   Skin: Negative for color change.   Allergic/Immunologic: Negative for environmental allergies and food allergies.   Neurological: Negative for seizures, syncope, facial asymmetry and speech difficulty.   Hematological: Negative for adenopathy.   Psychiatric/Behavioral: Negative for agitation. The patient is not hyperactive.        Objective     Vitals:    01/15/24 1130   BP: 138/74   Pulse: 68   Resp: 16   SpO2: 97%       Physical Exam  Constitutional:       Appearance: Normal appearance. He is well-developed.   HENT:      Head: Normocephalic.   Cardiovascular:      Rate and Rhythm: Normal rate and regular rhythm.      Heart sounds: Normal heart sounds.   Pulmonary:      Effort: Pulmonary effort is normal. No respiratory distress.      Breath sounds: Normal breath sounds. No wheezing or rales.   Abdominal:      General: Bowel sounds are normal.      Palpations: Abdomen is soft.      Tenderness: There is no abdominal tenderness.   Musculoskeletal:      Right lower leg: Edema present.      Left lower leg: Edema present.   Neurological:      General: No focal deficit present.      Mental Status: He is alert and oriented to person, place, and time.         Lab Results   Component Value Date    WBC 6.95 01/01/2024    HGB 13.8 01/01/2024    HCT 42.1 01/01/2024    .4 (H) 01/01/2024     01/01/2024         Chemistry        Component Value Date/Time     01/01/2024 1826    K 4.3 01/01/2024 1826     01/01/2024 1826    CO2 23 01/01/2024 1826    BUN 33 (H) 01/01/2024 1826    CREATININE 1.7 (H) 01/01/2024 1826        Component Value Date/Time    CALCIUM 9.8 01/01/2024 1826    ALKPHOS 58 01/01/2024 1826    AST 25 01/01/2024 1826    ALT 17 01/01/2024 1826    BILITOT 0.6 01/01/2024 1826            Lab Results   Component  "Value Date    CHOL 146 09/14/2022    CHOL 158 11/17/2021    CHOL 178 04/05/2021     Lab Results   Component Value Date    HDL 62 09/14/2022    HDL 62 11/17/2021    HDL 80 04/05/2021     Lab Results   Component Value Date    LDLCALC 78 09/14/2022    LDLCALC 87 11/17/2021    LDLCALC 91 04/05/2021     Lab Results   Component Value Date    TRIG 30 09/14/2022    TRIG 47 11/17/2021    TRIG 37 04/05/2021     No results found for: \"CHOLHDL\"    Lab Results   Component Value Date    TSH 1.61 11/13/2018       Lab Results   Component Value Date    HGBA1C 4.8 08/26/2015       No results found for: \"HAV\", \"HEPAIGM\", \"HEPBIGM\", \"HEPBCAB\", \"HBEAG\", \"HEPCAB\"    No results found for: \"MICROALBUR\", \"[id number]\"    Assessment/Plan   1. Dyspnea on exertion    2. Benign essential hypertension    3. Gastroesophageal reflux disease without esophagitis    4. Stage 3a chronic kidney disease (CMS/[facility])    5. Mixed hyperlipidemia    6. Hypomagnesemia    7. Age related osteoporosis, unspecified pathological fracture presence    8. Nonrheumatic aortic valve insufficiency    9. Large hiatal hernia      Problem List Items Addressed This Visit        Respiratory    Dyspnea on exertion - Primary    Relevant Orders    Transthoracic echo (TTE) complete    Large hiatal hernia       Circulatory    Benign essential hypertension (Chronic)    Overview     Overview:          Relevant Orders    Comprehensive metabolic panel    Nonrheumatic aortic valve insufficiency (Chronic)    Relevant Orders    Transthoracic echo (TTE) complete       Digestive    Esophageal reflux    Overview     Overview:             Genitourinary    CKD (chronic kidney disease) stage 3, GFR 30-59 ml/min (CMS/[facility]) (Chronic)       Musculoskeletal    Age related osteoporosis       Other    Mixed hyperlipidemia (Chronic)    Relevant Orders    Lipid panel    Hypomagnesemia    Current Assessment & Plan       __________________________________________  CONSOLIDATED ASSESSMENT AND PLAN:  -the " below A/P is the consolidated plan for the above conditions:  _____  Dyspnea on exertion and follow-up of ED visit:   -- Patient seen for left lower quadrant pain but pain resolved while in the ED.  No clear cause for this was identified.  Flagyl added to Cipro to complete a course for diverticulitis which was not seen as an acute finding on the CAT scan.  No recurrent left lower quadrant pain since that time however he has had dyspnea on exertion and a CAT scan showed that he has transverse colon and stomach herniating up into thoracic cavity on the CT scan due to a very large hiatal hernia.  Question whether this is the source of his dyspnea on exertion as described above.  We discussed that this may be very likely.  Recommend GI evaluation and he will contact Dr. Ramires to run it by him and see if he is able to see him in the office.  He knows him personally from his years at [employer].  Less likely that his dyspnea on exertion is heart failure related.  Echocardiogram not done and apparently 6 years so this was ordered at today's visit and he will coordinate this with follow-up with his cardiologist Dr. Francis who sees him for aortic insufficiency.    Hypertension:   -- Blood pressure well-controlled on current regimen.  Continue current treatment.    Hypercholesterolemia:   -- Stable.  Due for lipid profile check with other labs ordered at today's visit.  Continue current treatment at this time.    Chronic kidney disease:   -- Renal function stable.  Check again in March.    GERD:   -- As above, patient with large hiatal hernia with herniation of stomach and transverse colon possibly with dyspnea is the only major symptom.  He does not have GERD or dyspepsia symptoms at this time.  Plan as above.    By signing my name below, I, Marek Mathew, attest that this documentation has been prepared under the direction and in the presence of MD Marek Tinoco  1/15/2024 10:23 AM      Initial scribing of the note was done by hardy and final editing and complete note scribing was done by Dr. Elliott.        44  minutes on this date of service was spent by Dr. Elliott  performing the following activities: Previsit medical record review, diagnostic testing review, medical consult report review, obtaining history from patient, performing examination, entering orders, comprehensive medication reconciliation, counseling regarding evaluation and management of the above conditions as detailed above, patient was brought to the computer and the actual CAT scan films showing herniation of transverse colon and stomach were shown to him and discussion regarding what is possibly causing the symptoms of dyspnea with regard to the CT scan, and documentation of this visit.              No follow-ups on file.    Orders Placed This Encounter   Procedures   • Comprehensive metabolic panel     Standing Status:   Future     Standing Expiration Date:   1/15/2025     Order Specific Question:   Release to patient     Answer:   Immediate [1]   • Lipid panel     Standing Status:   Future     Standing Expiration Date:   1/15/2025     Order Specific Question:   Release to patient     Answer:   Immediate [1]   • Transthoracic echo (TTE) complete     Standing Status:   Future     Standing Expiration Date:   1/15/2026     Scheduling Instructions:      To schedule cardiology appointments with [facility], call Central Scheduling at [phone]. Thank you.     Order Specific Question:   Is contrast and/or agitated saline (bubbles) indicated for the study? (Contrast = Definity OR Lumason)     Answer:   No     Order Specific Question:   Release to patient     Answer:   Immediate [1]         Alex Elliott MD  1/15/2024

## 2024-01-31 NOTE — BRIEF OP NOTE
OFFICE VISIT    Patient: Tiffanie Mortensen   : 1985 MRN: [id number]    SUBJECTIVE:  Chief Complaint   Patient presents with    Annual Exam     [facility] for [facility]       38 year old female here for annual physical exam.     HISTORY OF PRESENT ILLNESS:     Annual physical exam:   Diet & exercise: Denies doing exercise, but she goes for walk, one to two miles five times a week. Follows regular [place]hi vegetarian diet, which includes paneer, lentils for protein.  Anxiety/Depression: Denies anxiety or depression.  Sleep health: No sleep issues.  Breast cancer screening: Denies breast pain.  Cervical cancer screening: Last HPV and Pap screening test was done on 10/28/2022.  Ob/gyn: Complains of lower abdominal pain that occur every 15 days following her menstrual cycle, and there is white discharge for two days around that time. Has a normal flow. Explains that the discomfort is intermittent and persists for a few hours at most.  Explains that she has a 30-day cycle. However, for the past four months, she has been having her menstrual cycle in a period of 27 to 28 days. The dates of her menstruation were , December 15, and .  Current medications: Takes vitamin B12 250 mcg daily and multivitamin gummies six pieces daily.    Vitamin D deficiency:   Currently, on vitamin D 2000 IU supplements daily. Started taking from 2024.    Additional comments:  Denies chest pain, shortness of breath, stomach issues, diarrhea or change in bowel movements.  No chronic muscle pain.  Inquires about fish oil prescription.  Complains of dry hair and skin, suspects it to be age related.    PAST MEDICAL HISTORY:  Past Medical History:   Diagnosis Date    No known problems      MEDICATIONS:  Current Outpatient Medications   Medication Sig Dispense Refill    Cholecalciferol (vitamin D) 50 mcg (2,000 units) capsule Take 50 mcg by mouth daily.      cyanocobalamin (Vitamin B-12) 250 MCG tablet Take 250 mcg by  OPEN REDUCTION INTERNAL FIXATION HIP/FEMUR PINNING/CANNULATED SCREWS (L) Procedure Note    Procedure:    OPEN REDUCTION INTERNAL FIXATION HIP/FEMUR PINNING/CANNULATED SCREWS  CPT(R) Code:  00451 - ME PERCUT FIX PROX/NECK FEMUR FX      Pre-op Diagnosis     * Closed fracture of left hip, initial encounter (CMS/[facility]) [S72.002A]       Post-op Diagnosis     * Closed fracture of left hip, initial encounter (CMS/[facility]) [S72.002A]    Surgeon(s) and Role:     * Efrain Waggoner MD - Primary    Anesthesia: General    Staff:   Circulator: Jessica Cuellar RN  Scrub Person: Luis Ocampo RN    Procedure Details   Hip pinning -- 3 cancellous screws    Estimated Blood Loss: 51 mL    Specimens:                No specimens collected during this procedure.      Drains: * No LDAs found *    Implants:   Implant Name Type Inv. Item Serial No.  Lot No. LRB No. Used Action   SCREW CANNULATED [id number] - [id number] Bone screw SCREW CANNULATED [id number]  [employer]  Left 3 Implanted   WASHER SPIKED - [id number]  WASHER SPIKED  [employer]OR[employer]  Left 3 Implanted              Complications:  None; patient tolerated the procedure well.           Disposition: PACU - hemodynamically stable.           Condition: stable    WBAT LLE  Postop abx  Xray hip tomorrow      Efrain Waggoner MD  Phone Number: [phone]       mouth daily.      Multiple Vitamins-Minerals (MULTIVITAMIN GUMMIES ADULT PO) Take 6 Pieces by mouth daily.      ergocalciferol (DRISDOL) 1.25 mg (50,000 units) capsule Take 1 capsule by mouth 1 day a week. 12 capsule 1     No current facility-administered medications for this visit.     ALLERGIES:  Allergies as of 01/30/2024    (No Known Allergies)     FAMILY HISTORY:  Family History   Problem Relation Age of Onset    Hypertension Mother     Other Mother         benign Brain tumor    Patient is unaware of any medical problems Father     Patient is unaware of any medical problems Maternal Grandmother     Cancer, Stomach Maternal Grandfather     Patient is unaware of any medical problems Paternal Grandmother     Patient is unaware of any medical problems Paternal Grandfather     Cancer Neg Hx     Diabetes Neg Hx     Heart disease Neg Hx      SOCIAL HISTORY:  Social History     Tobacco Use    Smoking status: Never     Passive exposure: Never    Smokeless tobacco: Never   Vaping Use    Vaping Use: never used   Substance Use Topics    Alcohol use: Never    Drug use: Never     Past Surgical HISTORY  Past Surgical History:   Pro[place]ality [place]    [place]    Hb delivery c section  2018       REVIEW OF SYSTEMS:    Constitutional: As Per HPI.  Respiratory: As Per HPI.  Cardiovascular: As Per HPI.  Gastrointestinal: As Per HPI.  Genitourinary: As Per HPI.  Musculoskeletal: As Per HPI.  Skin: As Per HPI.  Hematological: As Per HPI.   As per HPI all other systems reviewed and negative.    OBJECTIVE:  Visit Vitals  /76 (BP Location: RUE - Right upper extremity, Patient Position: Sitting, Cuff Size: Regular)   Pulse 79   Resp 15   Ht 5' 4.65\" (1.642 m)   Wt 70.8 kg (156 lb 1.4 oz)   LMP 01/12/2024 (Exact Date)   SpO2 100%   BMI 26.26 kg/m²       PHYSICAL EXAM:    GENERAL APPEARANCE: Well appearing, no acute distress.    HEENT: Normal, PEERL ,  TMs are visualized and are normal.,  No erythema or  cerumen ear canal.  NECK: Supple, no palpable cervical or supraclavicular lymphadenopathy, no palpable thyroid masses or tenderness.    CV: S1S2 RRR, no murmurs or gallop.  RESP: Clear to auscultation bilaterally, no rales or rhonchi.    ABDOMEN: Soft, non-tender, non-distended, +bowel sounds, no rebound or guarding or palpable mass.  EXT: No edema or varicosity  MUSK: Back no deformity, no spinal process tenderness.  NEURO: Normal motor tone, strength normal all ext, normal gait.  PSYCH: Normal affect and mood.  BREAST EXAM: No palpable mass or nipple discharge or abnormal tenderness. No palpable axillary Lymphadenopathy.  GYN: Deferred.    ASSESSMENT AND PLAN:  This 38 year old female presents with :  1. Annual physical exam    2. Vitamin D deficiency        Orders Placed This Encounter    Comprehensive Metabolic Panel    Thyroid Stimulating Hormone Reflex    Vitamin D -25 Hydroxy    Vitamin B12    Lipid Panel Without Reflex    CBC (includes Differential and Platelets)    Cholecalciferol (vitamin D) 50 mcg (2,000 units) capsule    cyanocobalamin (Vitamin B-12) 250 MCG tablet    Multiple Vitamins-Minerals (MULTIVITAMIN GUMMIES ADULT PO)     PLAN:    Annual physical exam:   Will order comprehensive labs. [facility] will [facility] to [facility] work.  Adequate calcium and vitamin D intake reviewed and recommended.  Discussed importance of healthy eating habits, include more plant based protein like tofu.  Breast exam negative.  Up-to-date with Pap smear.  I did explain to the patient that if she gets cycle more frequent meaning every 3 weeks, she may have to see the GYN.   I also explained to the patient, the pain and discomfort that she gets in the middle of the cycle could be related to ovulation.  Discussed the importance of regular exercise, 30 minutes 4-5 times per week.    Vitamin D deficiency:   Currently, taking vitamin D 2000 IU daily.  Ordered vitamin D level     Health Maintenance Due   Topic Date Due     Hepatitis B Vaccine (1 of 3 - 3-dose series) Never done    Varicella Vaccine (1 of 2 - 2-dose childhood series) Never done    COVID-19 Vaccine (4 - 2023-24 season) 09/01/2023     Follow up one year for a physical or sooner with any concerns.    Patient verbalized understanding.    Refer to orders.  Medical compliance with plan discussed and risks of non-compliance reviewed.  Patient education completed on disease process, etiology & prognosis.  Proper usage and side effects of medications reviewed & discussed.  Return to clinic as clinically indicated as discussed with the patient who verbalized understanding of the plan and is in agreement with the plan.    [employer], have created a visit summary document based on the audio recording between Dr. Eric Myles MD and this patient for the physician to review, edit as needed, and authenticate.    Creation Date: 1/30/2024     The documentation recorded by the scribe accurately and completely reflects the service(s) I personally performed and the decisions made by me.     Dr. Eric Myles MD

## 2024-03-12 ENCOUNTER — APPOINTMENT (OUTPATIENT)
Dept: LAB | Facility: CLINIC | Age: 89
End: 2024-03-12
Attending: INTERNAL MEDICINE
Payer: MEDICARE

## 2024-03-12 ENCOUNTER — HOSPITAL ENCOUNTER (OUTPATIENT)
Dept: CARDIOLOGY | Facility: CLINIC | Age: 89
Discharge: HOME | End: 2024-03-12
Attending: INTERNAL MEDICINE
Payer: MEDICARE

## 2024-03-12 DIAGNOSIS — I10 BENIGN ESSENTIAL HYPERTENSION: ICD-10-CM

## 2024-03-12 DIAGNOSIS — I35.1 NONRHEUMATIC AORTIC VALVE INSUFFICIENCY: Chronic | ICD-10-CM

## 2024-03-12 DIAGNOSIS — E78.2 MIXED HYPERLIPIDEMIA: ICD-10-CM

## 2024-03-12 DIAGNOSIS — R06.09 DYSPNEA ON EXERTION: ICD-10-CM

## 2024-03-12 LAB
ALBUMIN SERPL-MCNC: 4.3 G/DL (ref 3.5–5.7)
ALP SERPL-CCNC: 65 IU/L (ref 34–125)
ALT SERPL-CCNC: 18 IU/L (ref 7–52)
ANION GAP SERPL CALC-SCNC: 10 MEQ/L (ref 3–15)
AST SERPL-CCNC: 22 IU/L (ref 13–39)
BILIRUB SERPL-MCNC: 0.8 MG/DL (ref 0.3–1.2)
BUN SERPL-MCNC: 35 MG/DL (ref 7–25)
CALCIUM SERPL-MCNC: 10 MG/DL (ref 8.6–10.3)
CHLORIDE SERPL-SCNC: 105 MEQ/L (ref 98–107)
CHOLEST SERPL-MCNC: 147 MG/DL
CO2 SERPL-SCNC: 26 MEQ/L (ref 21–31)
CREAT SERPL-MCNC: 1.6 MG/DL (ref 0.7–1.3)
EGFRCR SERPLBLD CKD-EPI 2021: 41.2 ML/MIN/1.73M*2
GLUCOSE SERPL-MCNC: 102 MG/DL (ref 70–99)
HDLC SERPL-MCNC: 67 MG/DL
HDLC SERPL: 2.2 {RATIO}
LDLC SERPL CALC-MCNC: 71 MG/DL
NONHDLC SERPL-MCNC: 80 MG/DL
POTASSIUM SERPL-SCNC: 4.9 MEQ/L (ref 3.5–5.1)
PROT SERPL-MCNC: 6.8 G/DL (ref 6–8.2)
SODIUM SERPL-SCNC: 141 MEQ/L (ref 136–145)
TRIGL SERPL-MCNC: 44 MG/DL

## 2024-03-12 PROCEDURE — 36415 COLL VENOUS BLD VENIPUNCTURE: CPT

## 2024-03-12 PROCEDURE — 80053 COMPREHEN METABOLIC PANEL: CPT

## 2024-03-12 PROCEDURE — 80061 LIPID PANEL: CPT

## 2024-03-12 PROCEDURE — 93306 TTE W/DOPPLER COMPLETE: CPT

## 2024-03-12 PROCEDURE — 93306 TTE W/DOPPLER COMPLETE: CPT | Mod: 26 | Performed by: INTERNAL MEDICINE

## 2024-03-13 ENCOUNTER — TELEPHONE (OUTPATIENT)
Dept: PRIMARY CARE | Facility: CLINIC | Age: 89
End: 2024-03-13
Payer: MEDICARE

## 2024-03-13 LAB
AORTIC ROOT ANNULUS: 3.4 CM
AORTIC VALVE MEAN VELOCITY: 1.1 M/S
AORTIC VALVE VELOCITY TIME INTEGRAL: 36 CM
ASCENDING AORTA: 3.6 CM
AV MEAN GRADIENT: 6 MMHG
AV PEAK GRADIENT: 15 MMHG
AV PEAK VELOCITY-S: 1.92 M/S
AV REG PEAK VEL: 3.42 M/S
AV REGURGITATION PRESSURE HALF TIME: 633 MS
E WAVE DECELERATION TIME: 280 MS
E/A RATIO: 1.1
E/E' RATIO: 9.8
E/LAT E' RATIO: 10.3
EDV (BP): 64.6 CM3
EF (A4C): 57.6 %
EF A2C: 46.7 %
EJECTION FRACTION: 50.9 %
ESV (BP): 31.7 CM3
FRACTIONAL SHORTENING: 43.84 %
INTERVENTRICULAR SEPTUM: 1.13 CM
IVC-EXP: 1.63 CM
IVC-INS: 0.61 CM
LA ESV (BP): 46 CM3
LAAS-AP2: 17.7 CM2
LAAS-AP4: 17.7 CM2
LALD A4C: 5.45 CM
LALD A4C: 5.56 CM
LAV-S: 45.1 CM3
LEFT ATRIUM VOLUME: 46 CM3
LEFT INTERNAL DIMENSION IN SYSTOLE: 1.96 CM
LEFT VENTRICLE DIASTOLIC VOLUME: 76.1 CM3
LEFT VENTRICLE SYSTOLIC VOLUME: 32.3 CM3
LEFT VENTRICULAR INTERNAL DIMENSION IN DIASTOLE: 3.49 CM
LEFT VENTRICULAR POSTERIOR WALL IN END DIASTOLE: 1.15 CM
LV DIASTOLIC VOLUME: 53.9 CM3
LV ESV (APICAL 2 CHAMBER): 28.7 CM3
LVAD-AP2: 20.9 CM2
LVAD-AP4: 25.1 CM2
LVAS-AP2: 13.6 CM2
LVAS-AP4: 15 CM2
LVLD-AP2: 6.81 CM
LVLD-AP4: 6.94 CM
LVLS-AP2: 5.46 CM
LVLS-AP4: 5.96 CM
LVOT 2D: 2 CM
LVOT A: 3.14 CM2
MV E'TISSUE VEL-LAT: 0.06 M/S
MV E'TISSUE VEL-MED: 0.06 M/S
MV PEAK A VEL: 0.56 M/S
MV PEAK E VEL: 0.6 M/S
MV STENOSIS PRESSURE HALF TIME: 82 MS
MV VALVE AREA P 1/2 METHOD: 2.68 CM2
POSTERIOR WALL: 1.15 CM
PULMONARY REGURGITATION LATE DIASTOLIC VELOCITY: 0.93 M/S
TAPSE: 2.43 CM
TR MAX PG: 26.42 MMHG
TRICUSPID VALVE PEAK REGURGITATION VELOCITY: 2.57 M/S

## 2024-03-13 NOTE — TELEPHONE ENCOUNTER
----- Message from Alex Elliott MD sent at 3/13/2024 12:35 PM EDT -----  Please call patient that his lab results were good.  Renal function stable.  Echocardiogram essentially normal.  Thanks

## 2024-03-20 ENCOUNTER — OFFICE VISIT (OUTPATIENT)
Dept: BARIATRICS | Facility: CLINIC | Age: 89
End: 2024-03-20
Payer: MEDICARE

## 2024-03-20 VITALS
SYSTOLIC BLOOD PRESSURE: 137 MMHG | HEIGHT: 64 IN | TEMPERATURE: 98 F | HEART RATE: 72 BPM | WEIGHT: 140.8 LBS | BODY MASS INDEX: 24.04 KG/M2 | DIASTOLIC BLOOD PRESSURE: 80 MMHG | OXYGEN SATURATION: 97 %

## 2024-03-20 DIAGNOSIS — K44.9 LARGE HIATAL HERNIA: Primary | ICD-10-CM

## 2024-03-20 PROCEDURE — 99202 OFFICE O/P NEW SF 15 MIN: CPT | Performed by: SURGERY

## 2024-03-20 ASSESSMENT — ENCOUNTER SYMPTOMS: SHORTNESS OF BREATH: 1

## 2024-03-20 NOTE — PROGRESS NOTES
Patient ID: Efrain Cardona Jr.                              : 6/10/1934  MRN: [id number]                                            Visit Date: 3/20/2024  Encounter Provider: Cheyanne Gregory  Referring Provider: Iam Loredo MD    Subjective   Chief Complaint: Follow-up (Hiatal hernia /)    Efrain Cardona Jr. is a 89 y.o. old male presenting today for their surgical evaluation of a hiatal hernia.     CT scan with contrast 2024  IMPRESSION:  1. Colonic diverticulosis without evidence of acute diverticulitis.  2. Increase in large hiatal hernia containing numerous upper abdominal contents  as discussed below.  3. Stable fusiform aortobiiliac ectasia.    BOWEL/PERITONEUM: Bowel demonstrates normal caliber without evidence of  obstruction. Scattered colonic diverticulosis without evidence of acute  diverticulitis. Normal appendix. There is a large diaphragmatic hernia, overall  increased in size, with the fascial defect now measuring approximately 5.2 x 5.1  cm in AP and transverse dimensions respectively, now containing essentially the  entire transverse colon, the entire stomach in a slightly organoaxial  configuration, proximal duodenum, as well as portion of the proximal pancreatic  body. No enlarged mesenteric lymph nodes. No ascites or free air, no fluid  Collection.  ABDOMINAL WALL: Small fat-containing right inguinal hernia.    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  He denies any symptoms of dysphagia or reflux.  When asked if he has difficulty tolerating a large meal he states that that is not his normal eating pattern anyway and so he would not have noticed.  He does occasionally have shortness of breath however is able to exercise regularly and follows with cardiology.  He is not particularly interested in having this repaired and is only seeking evaluation based on the recommendations from another provider.      Medications:   Current Outpatient Medications:   •  amLODIPine (NORVASC) 5 mg tablet, Take 7.5 mg by mouth daily., Disp: , Rfl:   •  diphenoxylate-atropine (LOMOTIL) 2.5-0.025 mg per tablet, Take 1 tablet by mouth daily as needed for diarrhea., Disp: 30 tablet, Rfl: 1  •  finasteride (PROSCAR) 5 mg tablet, Take 5 mg by mouth daily., Disp: , Rfl:   •  lisinopriL (PRINIVIL) 40 mg tablet, Take 80 mg by mouth daily., Disp: , Rfl:   •  predniSONE (DELTASONE) 10 mg tablet, take 1 tablet by mouth twice a day as directed, Disp: , Rfl:   •  psyllium husk (METAMUCIL ORAL), Take 2 capsules by mouth daily. Pt is unaware of dosage, Disp: , Rfl:   •  sildenafiL, pulm.hypertension, 20 mg tablet, take 5 tablets by mouth as directed if needed, Disp: , Rfl:   •  simvastatin (ZOCOR) 40 mg tablet, Take 1 tablet by mouth daily., Disp: , Rfl: 0    Past Medical History:  has a past medical history of Abnormal EKG, Arrhythmia, Chronic kidney disease, Disease of thyroid gland, Diverticulitis of colon, GI (gastrointestinal bleed), Heart murmur, Hyperlipidemia, Hypertension, Infectious viral hepatitis, Lipid disorder, Prostate enlargement, and Renal failure.  Past Surgical History:  has a past surgical history that includes Hernia repair; Kidney surgery (Left); and Hip fracture surgery (Left, 2020).  Social History:   Social History     Tobacco Use   • Smoking status: Former     Types: Cigarettes     Quit date: 1970     Years since quittin.2   • Smokeless tobacco: Never   • Tobacco comments:     PIPE    Vaping Use   • Vaping Use: Never used   Substance Use Topics   • Alcohol use: Yes     Comment: [place]/daily   • Drug use: No      Family History: family history includes Leukemia in his biological father; Lung cancer in his biological mother.   Allergies: is allergic to griseofulvin and tetracycline.     Review of Systems   Respiratory: Positive for shortness of breath.    All other systems reviewed and are negative.    The  "following have been reviewed and updated as appropriate in this visit:          Objective   Vitals:   Visit Vitals  /80   Pulse 72   Temp 36.7 °C (98 °F)   Ht 1.626 m (5' 4\")   Wt 63.9 kg (140 lb 12.8 oz)   SpO2 97%   BMI 24.17 kg/m²     Physical Exam  Vitals reviewed.   Constitutional:       Appearance: He is normal weight.   HENT:      Head: Normocephalic and atraumatic.   Cardiovascular:      Rate and Rhythm: Normal rate.   Pulmonary:      Effort: Pulmonary effort is normal.   Neurological:      Mental Status: He is alert.              Assessment/Plan   Problem List Items Addressed This Visit        Respiratory    Large hiatal hernia - Primary     89-year-old male presenting with an asymptomatic hiatal hernia.  I discussed with Dr. Cardona that given that he is entirely asymptomatic from this hiatal hernia that I would not recommend elective repair as I cannot make him feel any better than that, and the surgery itself has significant potential morbidity associated with it.  I did  him that there is a risk of torsion of the contents of the hiatal hernia including gastric torsion as well as torsion of the transverse colon but that in either of those cases it would present with abdominal pain nausea and vomiting.  If he were to have those symptoms he should present to the emergency department for evaluation however the risk of that is low enough that I would not recommend an elective repair based on that hypothetical risk alone.  I do not think that his intermittent shortness of breath is likely to be related to this.  Should something about his symptoms change he is welcome to follow-up with me in the future however I do not think he will need this repaired at any point.    No follow-ups on file.       Cheyanne Gregory MD     I spent 17 minutes on this date of service performing the following activities: obtaining history, documenting, preparing for visit, obtaining / reviewing records, providing " counseling and education and independently reviewing study/studies.

## 2024-05-15 ENCOUNTER — APPOINTMENT (OUTPATIENT)
Dept: LAB | Facility: CLINIC | Age: 89
End: 2024-05-15
Attending: INTERNAL MEDICINE
Payer: MEDICARE

## 2024-05-15 ENCOUNTER — OFFICE VISIT (OUTPATIENT)
Dept: PRIMARY CARE | Facility: CLINIC | Age: 89
End: 2024-05-15
Payer: MEDICARE

## 2024-05-15 VITALS
DIASTOLIC BLOOD PRESSURE: 70 MMHG | HEIGHT: 64 IN | RESPIRATION RATE: 16 BRPM | HEART RATE: 64 BPM | WEIGHT: 139.4 LBS | SYSTOLIC BLOOD PRESSURE: 124 MMHG | OXYGEN SATURATION: 97 % | BODY MASS INDEX: 23.8 KG/M2

## 2024-05-15 DIAGNOSIS — R06.09 DYSPNEA ON EXERTION: ICD-10-CM

## 2024-05-15 DIAGNOSIS — R63.4 WEIGHT LOSS: ICD-10-CM

## 2024-05-15 DIAGNOSIS — E78.2 MIXED HYPERLIPIDEMIA: ICD-10-CM

## 2024-05-15 DIAGNOSIS — E83.42 HYPOMAGNESEMIA: ICD-10-CM

## 2024-05-15 DIAGNOSIS — Z87.39 HISTORY OF GOUT: ICD-10-CM

## 2024-05-15 DIAGNOSIS — I10 BENIGN ESSENTIAL HYPERTENSION: Primary | ICD-10-CM

## 2024-05-15 DIAGNOSIS — N18.31 STAGE 3A CHRONIC KIDNEY DISEASE (CMS/HCC): ICD-10-CM

## 2024-05-15 DIAGNOSIS — K21.9 GASTROESOPHAGEAL REFLUX DISEASE WITHOUT ESOPHAGITIS: ICD-10-CM

## 2024-05-15 DIAGNOSIS — I35.1 NONRHEUMATIC AORTIC VALVE INSUFFICIENCY: ICD-10-CM

## 2024-05-15 DIAGNOSIS — M81.0 AGE RELATED OSTEOPOROSIS, UNSPECIFIED PATHOLOGICAL FRACTURE PRESENCE: ICD-10-CM

## 2024-05-15 DIAGNOSIS — K44.9 LARGE HIATAL HERNIA: ICD-10-CM

## 2024-05-15 DIAGNOSIS — I10 BENIGN ESSENTIAL HYPERTENSION: ICD-10-CM

## 2024-05-15 LAB
ALBUMIN SERPL-MCNC: 4.2 G/DL (ref 3.5–5.7)
ALP SERPL-CCNC: 60 IU/L (ref 34–125)
ALT SERPL-CCNC: 17 IU/L (ref 7–52)
ANION GAP SERPL CALC-SCNC: 7 MEQ/L (ref 3–15)
AST SERPL-CCNC: 20 IU/L (ref 13–39)
BILIRUB SERPL-MCNC: 0.8 MG/DL (ref 0.3–1.2)
BUN SERPL-MCNC: 32 MG/DL (ref 7–25)
CALCIUM SERPL-MCNC: 9.6 MG/DL (ref 8.6–10.3)
CHLORIDE SERPL-SCNC: 105 MEQ/L (ref 98–107)
CO2 SERPL-SCNC: 27 MEQ/L (ref 21–31)
CREAT SERPL-MCNC: 1.6 MG/DL (ref 0.7–1.3)
EGFRCR SERPLBLD CKD-EPI 2021: 41.2 ML/MIN/1.73M*2
GLUCOSE SERPL-MCNC: 103 MG/DL (ref 70–99)
POTASSIUM SERPL-SCNC: 4.4 MEQ/L (ref 3.5–5.1)
PROT SERPL-MCNC: 7 G/DL (ref 6–8.2)
SODIUM SERPL-SCNC: 139 MEQ/L (ref 136–145)
TSH SERPL DL<=0.05 MIU/L-ACNC: 1.32 MIU/L (ref 0.34–5.6)
URATE SERPL-MCNC: 8.4 MG/DL (ref 4.4–7.6)

## 2024-05-15 PROCEDURE — 80053 COMPREHEN METABOLIC PANEL: CPT

## 2024-05-15 PROCEDURE — 99215 OFFICE O/P EST HI 40 MIN: CPT | Performed by: INTERNAL MEDICINE

## 2024-05-15 PROCEDURE — 84550 ASSAY OF BLOOD/URIC ACID: CPT

## 2024-05-15 PROCEDURE — 84443 ASSAY THYROID STIM HORMONE: CPT

## 2024-05-15 PROCEDURE — 36415 COLL VENOUS BLD VENIPUNCTURE: CPT

## 2024-05-15 RX ORDER — DIPHENOXYLATE HYDROCHLORIDE AND ATROPINE SULFATE 2.5; .025 MG/1; MG/1
1 TABLET ORAL DAILY PRN
Qty: 10 TABLET | Refills: 1 | Status: SHIPPED | OUTPATIENT
Start: 2024-05-15

## 2024-05-15 RX ORDER — HYDROCHLOROTHIAZIDE 25 MG/1
6.25 TABLET ORAL DAILY
COMMUNITY

## 2024-05-15 ASSESSMENT — ENCOUNTER SYMPTOMS
ANAL BLEEDING: 0
FACIAL SWELLING: 0
FACIAL ASYMMETRY: 0
HEMATURIA: 0
JOINT SWELLING: 0
RECTAL PAIN: 0
HYPERACTIVE: 0
EYE DISCHARGE: 0
DIFFICULTY URINATING: 0
ABDOMINAL PAIN: 0
SHORTNESS OF BREATH: 0
APNEA: 0
SPEECH DIFFICULTY: 0
SEIZURES: 0
CHILLS: 0
COLOR CHANGE: 0
COUGH: 0
FEVER: 0
CHEST TIGHTNESS: 0
PALPITATIONS: 0
AGITATION: 0
ADENOPATHY: 0
POLYDIPSIA: 0

## 2024-05-15 ASSESSMENT — PATIENT HEALTH QUESTIONNAIRE - PHQ9: SUM OF ALL RESPONSES TO PHQ9 QUESTIONS 1 & 2: 0

## 2024-05-15 NOTE — ASSESSMENT & PLAN NOTE
__________________________________________  CONSOLIDATED ASSESSMENT AND PLAN:  -the below A/P is the consolidated plan for the above conditions:  _____  Hypertension:   --Blood pressure well controlled on current regimen.  This with reduction of amlodipine to 5 mg daily and adding hydrochlorothiazide 6.25 mg daily in the morning.  Recorded at 124/70 in the office today.   Pt has also lost weight since last visit.  Perhaps 6 pounds.    We discussed that this may be causing his blood pressure to be at times as low as  over 50s.    Recommend patient reduce hydrochlorothiazide 6.25 mg to 4 days/week.  If blood pressure still trending below 110 might also reduce amlodipine to 2.5.  His recent weight loss may be accounting for his blood pressure being overall lower.  Will check CMP, TSH, and Uric Acid.     Weight loss  -- Patient reports perhaps slightly decreased caloric intake but normal appetite and no other symptoms worrisome for causes of weight loss such as abdominal pain.  We discussed that as people approach 89 often her appetite drops off of bed causing 5 to 10 pounds of weight loss.  He will continue to monitor his appetite and his weights.  His weight loss may be accounting for slightly lower blood pressure overall as above.    Hypercholesterolemia:   --This has been well controlled on 3/12/24 Total Cholesterol was 147, HDL 67, LDL 71. This appears to be a primary prevention strategy but he has done extremely well with it so despite his age of 88 he is opted to continue current treatment.    Chronic kidney disease:   --Creatinine has been stable most recently on 3/12/24 at 1.6    Osteoporosis:   --Stable at this time.     GERD:   --Patient only takes Prilosec PRN. He has not needed to take it recently.    Edema:   --Reduce evening amlodipine to 2.5 mg and Hydrochlorothizide to 4 days a week while monitoring BP.     Vaccination counseling:  Recommend COVID booster in the next few weeks as recommended by  the CDC this spring.  Also discussed Shingrix vaccine which he has never gotten.  Generally recommend this.  RSV and COVID new booster in the fall.    By signing my name below, I, Marek Mathew, attest that this documentation has been prepared under the direction and in the presence of MD Marek Tinoco  5/15/2024 11:14 AM    Initial scribing of the note was done by hardy and final editing and complete note scribing was done by Dr. Elliott.      48    minutes on this date of service was spent by Dr. Elliott  performing the following activities: Previsit medical record review, diagnostic testing review, medical consult report review, obtaining history from patient, performing examination, entering orders, comprehensive medication reconciliation, counseling regarding evaluation and management of the above conditions as detailed above including discussion around weight loss concerns and management of hypertension as above, and documentation of this visit.

## 2024-05-15 NOTE — PROGRESS NOTES
Alex Elliott MD  Geriatric Medicine                                                    GERIATRIC MEDICINE    Subjective      Patient ID: Efrain Cardona Jr. is a 89 y.o. male.    Disease Management Visit    The patient is here for an acute problem and for disease Management for evaluation and management of the following chronic medical problems.    This includes: symptom review, medication regimen review, laboratory monitoring, and review of diagnostic testing.  _____________________________________________________________________  5/15/24 interval disease management update:  --Hypertension: Blood pressure well controlled on current regimen. Recorded at 124/70 in the office today. Pt has lost weight since last visit.   Patient reports starting on hydrochlorothiazide at 6.25 mg daily.  He breaks up the pills to quarter pills on his own.  He restarted this due to venous insufficiency came on about 4 months ago.  He also reduced amlodipine from 7.5 mg daily to 5 mg daily.  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  --Hypercholesterolemia: This has been well controlled on 3/12/24 Total Cholesterol was 147, HDL 67, LDL 71. This appears to be a primary prevention strategy but he has done extremely well with it so despite his age of 88 he is opted to continue current treatment.  --Chronic kidney disease: Creatinine has been stable most recently on 3/12/24 at 1.6 despite going back on low-dose hydrochlorothiazide.  --Osteoporosis: Had DEXA scan and condition is roughly stable. Levels are in Osteopenia range. He follows with rheumatology, Dr. Polo Booker, and was told to start either Fosamax or Prolia. He did not start these. He notes Fosamax has contraindications with his medications for CKD. Pt notes a previous fall where his hip had screws implanted. No other falls noted. 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 He has lost 4 inches of height due to  "scoliosis.  We again discussed x-ray findings and follow-up DEXA scan results which was performed on 7/5/2023.T-scores at lumbar spine was +0.4 and right hip was -2.1.  FRAX score results were 12.0% for major osteoporotic fracture at 10 years and 5.2% for hip fracture at 10 years.  --GERD: Patient only takes Prilosec PRN. He has not needed to take it recently.  --Edema: Pt reports having bad edema in January leading to him restarting hydrochlorothizide. Pt started on hydrochlorothiazide 6.25 mg once a day which has helped solve his edema. Pt notes that he check his BP daily and noticed that it occasionally runs low Pt reports systoligc at  and diastolics around 50. Pt reports taking amlodipine 5 mg and stopped amlodipine 2.5 mg in the morning after he started hydrochlorothizide.   --RSV, COVID, and Shingrix vaccination counseling was discussed. Pt will get all three vaccines at his local pharmacy.   --Comprehensive medication reconciliation was performed at today's visit.    Medication reconciliation:  Lisinopril 20 mg twice daily    -dose reduction at 3/8/2021 visit  Norvasc 5 mg in the evening   -dose reduction on his own January 2024  For thiazide 6.25 mg daily  Simvastatin 40 mg daily  Proscar 5 mg daily  Mag-Ox 500 mg daily 3 days/week  --Note: He takes aspirin 325 mg 3 tablets 1 day/week prior to shooting trap.  We had previously discussed the small risk this poses.  _________________________  _________________________    1/15/24 ED follow-up and interval disease management update:  --ED visit: Pt was in the ED on 1/1/24 for LLQ abdominal pain. ED noted \"Colonic diverticulosis without evidence of acute diverticulitis, Increase in large hiatal hernia containing numerous upper abdominal contents as discussed below. Stable fusiform aortobiiliac ectasia. Patient well-appearing here, made aware of results of CT.  He is aware of his large hiatal hernia. I did encourage him to follow-up with surgery for this.  " The ED physician provided a prescription for Flagyl to add to his Cipro which I will send to his pharmacy.   Patient's pain resolved while he was in the emergency room.  It was more acute in onset pain that brought him to the ER in order to have an abscess ruled out.  He is convinced that this may have been acute diverticulitis that had been largely treated with Cipro and the acute pain was due to something not identified.  He did finish the Cipro and Flagyl combination.  CT scan at that time showed that his very large hiatal hernia is larger than it had been seen and resulted in the herniation of numerous upper abdominal organs into the thoracic cavity.  This included the entire transverse colon and the entire stomach.  The proximal duodenum and a portion of the proximal pancreatic body was also herniated.  Patient has felt well since that time except that on Saturday 1/13 and Sunday, 1/14/2024 he noted dyspnea on exertion.  This had resolved today and was able to exercise today without any exercise intolerance.  He questions whether he had acute worsening of his hiatal hernia with above herniation of organs into the thoracic cavity on the left contributing or causing his dyspnea on exertion.  Of note he has not had GERD or reflux symptoms or other symptoms of GERD.  He has not had recent GI follow-up to further evaluate this possibility.  Of note the CAT scan also showed extensive coronary artery calcification but he has had no symptoms suggestive of ischemia  --Hypertension:   Blood pressure well controlled on current regimen.  He has mild edema from venous insufficiency due to amlodipine but this is stable with right ankle edema which is trace.  --Hypercholesterolemia:   This has been well controlled on 9/14/2023 his LDL was 78.  This appears to be a primary prevention strategy but he has done extremely well with it so despite his age of 88 he is opted to continue current treatment.  --Chronic kidney disease:    Creatinine has been stable most recently on 7/5/2023 at 1.6  --Osteoporosis:   Had DEXA scan and condition is roughly stable. Levels are in Osteopenia range. He follows with rheumatology, Dr. Polo Booker, and was told to start either Fosamax or Prolia. He did not start these. He notes Fosamax has contraindications with his medications for CKD. Pt notes a previous fall where his hip had screws implanted. No other falls noted. 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 He has lost 4 inches of height due to scoliosis.  We again discussed x-ray findings and follow-up DEXA scan results which was performed on 7/5/2023.T-scores at lumbar spine was +0.4 and right hip was -2.1.  FRAX score results were 12.0% for major osteoporotic fracture at 10 years and 5.2% for hip fracture at 10 years.  --GERD:   Patient only takes Prilosec PRN. He has not needed to take it recently.  --Comprehensive medication reconciliation was performed at today's visit.    09/18/23 interval disease management update:  --Hypertension: Blood pressure well controlled on current regimen.  He has mild edema from venous insufficiency due to amlodipine but this is stable with right ankle edema which is trace.  --Hypercholesterolemia: This has been well controlled on 9/14/2023 his LDL was 78.  This appears to be a primary prevention strategy but he has done extremely well with it so despite his age of 88 he is opted to continue current treatment.  --Chronic kidney disease: Creatinine has been stable most recently on 7/5/2023 at 1.6  --Osteoporosis: Had DEXA scan and condition is roughly stable. Levels are in Osteopenia range. He follows with rheumatology, Dr. Polo Booker, and was told to start either Fosamax or Prolia. He did not start these. He notes Fosamax has contraindications with his medications for CKD. Pt notes a previous fall where his hip had screws implanted. No other falls noted. Starts taking prednisone  due to chronic back pain a week before going to trap shoot. He has lost 4 inches of height due to scoliosis.   We again discussed x-ray findings and follow-up DEXA scan results which was performed on 7/5/2023.  T-scores at lumbar spine was +0.4 and right hip was -2.1.  FRAX score results were 12.0% for major osteoporotic fracture at 10 years and 5.2% for hip fracture at 10 years.  --GERD: Patient only takes Prilosec PRN. He has not needed to take it recently.  --Influenza Vaccination counseling was discussed. Pt will get vaccine at a later date. COVID 19 vaccine consultation was discussed. Should be expected within next week.   --Comprehensive medication reconciliation was performed at today's visit.    05/15/23 interval disease management update:  --Hypertension: Blood pressure well controlled on current regimen.  He has mild edema from venous insufficiency due to amlodipine but this is stable with right ankle edema which is trace.  --Hypercholesterolemia: This has been well controlled on 9/14/2023 his LDL was 78.  This appears to be a primary prevention strategy but he has done extremely well with it so despite his age of 88 he is opted to continue current treatment.  --Chronic kidney disease: Creatinine has been stable most recently on 2/22/2023 1.5.  --Osteoporosis: He has lost about 4 inches of height. DEXA scan 4 years ago showed mild early osteoporosis. He followed with rheumatology, Dr. Polo Booker, and was told to start either Fosamax or Prolia. He did not start these. He notes Fosamax has contraindications with his medications, but he does not know which medications.  --GERD: Patient only takes Prilosec PRN. He has not needed to take it recently.  --Comprehensive medication reconciliation was performed at today's visit.    2/27/2023 interval disease management update:  -- Hypertension: Patient's blood pressure stable on current regimen.  Continuing on current treatment.  He reports his home blood pressure  monitor occasionally goes up to 144 but averaging in the 120s to 130s.  -- Chronic kidney disease: Creatinine stable on 2/22/2023 creatinine stable at 1.5.  -- Hypercholesterolemia: Lipid numbers excellent in September.  We will check them again following next visit.  -- BPH: Stable on Proscar.  Continue current treatment.  -- Prevention: Patient asked whether he is due for a pneumonia booster vaccine.  He had a second booster Pneumovax 23 in 2011 and had Prevnar 13 in 2015 so we discussed that he has done pneumonia vaccination for life.  -- Comprehensive medication reconciliation was performed at today's visit.      -----------------------------  Previous visits and chronic problem review:    Hypertension  --- Update 11/9/2020: Blood pressure is now well controlled his hydrochlorothiazide was stopped due to rising creatinine up to 2.6.  Creatinine is now down to 1.3.  --- Update 7/8/2020: Blood pressure is low as above.  Blood pressure medications put on hold.  --- Update 3/11/2020: Blood pressure stable.  His cardiologist dropped his Toprol.  Hydrochlorothiazide was increased to daily dosing.  This simplified his regimen.  He is well controlled at home he is running 110-130/80  --- Update 11/6/2019: Patient is concerned because he has hypertension on morning blood pressure check in the range of 160/90 but by afternoon it is down to 120/80.  At today's visit he is 136/80.  His cardiologist had previously addressed this concern and was not concerned about it.  --- Update 7/10/2019: Stable on current regimen.  Note he is successfully weaned off Norvasc with addition of hydrochlorothiazide and his blood pressure is tolerating hydrochlorothiazide dose reduction to 12.5 g daily  --- Update 3/4/18: Blood pressure stable with medication change with reduction of Norvasc to 5 mg in addition of hydrochlorothiazide 25 mg Monday Wednesday Friday.  --- Update 6/25/18: Stable.  ---update 2/26/18: Stable; still occasional edema  on hot days from amlodipine but he is tolerating it.  ---Update 10/30/17: home BP stable.  --- Update 7/5/17: Home BP tracking stable--however he reports some variability  --- Previous visits: BP is stable on dose reduction of Norvasc and a small dose of Toprol --summertime edema from Norvasc.  --- Previous visit:Patient notes that his home blood pressure monitoring is all from 10-20 points higher than what we check in the office.  He reports running in the systolic range of 125-140 at home.  --- Patient asked about switching from Vasotec to lisinopril do to major cost advantages.  He also notes summertime edema from Norvasc.  Medications       ((( Toprol 12.5 mg daily )))   -by cardiologist 1/31/2020  Norvasc 5 mg daily   Lisinopril 40 mg daily      ((( Hydrochlorothiazide 12.5 mg   Daily )))       (((   ASA   )))  --stopped due to study from [place] recommending against ASA for primary prevention    Anemia  -He has recurrent iron deficiency anemia.  --- Update 11/9/2020: Most recent hemoglobin was excellent at 12.0 on 7/17/2020  --- Update 7/8/2020: Most recent hemoglobin was normal on 1/31/2020 at 12.5.  --- 3/11/2020: This is resolved with most recent hemoglobin on 1/31/2020 of 12.5  --- Update 11/6/2019: Hemoglobin was normal at 12.7 on most recent check which was 9/18/2019.  --- Date 7/10/2019: Hemoglobin on 4/18/2019 was normal at 13.5.  No evidence of recurrent GI bleeding.  --- Update 3/4/19: Hemoglobin was normal on last check 11/13/18 at 14.7  --- Update 6/25/18: Stable.  Recent hemoglobin has been normal most recently 3/20/18 was 14.5.  This is normalized since he stopped aspirin.  ---update 2/26/18: EGD done by GI this summer was negative.He is due for follow-up CBC next month.  Already ordered.  --- Update 7/5/17: Patient has had no signs of GI bleeding.  --- [place] to be due to AVM somewhere in his GI tract causing occult GI bleeding.  ---His recent hemoglobin was 9/19/17 Hgb 14.3  -off ASA  2/15/17  hemoglobin was 13.1  8/18/16 was 14.1  5/25/16 was 12.8  12/30/15 was 14.1  6/30/15 was 13.9  ---Previous visit:  He has since stopped his iron for his capsule endoscopy study which was done approximately 3 weeks ago to evaluate colonic and gastric AVMs as well as the small bowel for source of blood loss.----Patient reports that his capsule endoscopy study was negative. GI recommended stopping aspirin.  ---His gastroenterologist feels this is most likely do to either gastric or colonic AVMs  Last EGD and colonoscopy were done in July 2014.    Hypercholesterolemia  -Goal of treatment is LDL around 100  Primary prevention strategy  Last lipids  --- Update 3/11/2020: Most recent lipid profile on 7/17/2020: Total cholesterol 131 HDL 55 LDL 67  1/31/2020 was excellent: Total cholesterol 143 HDL 64 LDL 72  4/18/2019: Total cholesterol 172 HDL 52   9/19/17:    HDL 71  LDL 92  2/15/17: Total cholesterol 151 HDL 69 LDL 77  5/25/16: Total cholesterol 168 HDL 72 LDL 81  8/26/15: Total cholesterol 198 HDL 71   11/24/14:    HDL 79  LDL 84  Medications  Zocor 40 mg daily    GERD  --- Update 11/9/2020: Stable now on as needed regimen.  --- Update 7/8/2020: Stable  --- Update 11/6/2019: Patient reports that he takes his PPI about 5 days/week.  In light of his osteoporosis and hypomagnesemia we discussed possibly switching to an H2 blocker such as ranitidine.  We discussed recommended trial of ranitidine 150 mg daily at bedtime.  He may check with his GI specialist Dr. Rollins about this recommendation.  As above he has 2 of the complications that PPI cause including accelerated osteoporosis and hypomagnesemia.  Medications  Prilosec 20 mg daily as needed    BPH  Medication  Proscar 5 mg daily    Gout  --- Update 11/9/2020: Allopurinol was stopped as his hydrochlorothiazide which was the likely trigger of his gout attacks was also stopped.  --- Update 7/8/2020: Yesterday on 7/7/2020 his nephrologist  recommended reducing allopurinol to 100 mg daily.  He ordered a uric acid follow-up blood test.  --- Date 3/11/20: Uric acid remains less than 6 on hydrochlorothiazide.  --- Update 11/6/2019: Stable without recent attack  --- Update 7/10/2019: Patient is tolerating hydrochlorothiazide without any episodes of gout.  --- Update 2/26/18: No recent attacks of gout  Last uric acid 9/19/17 was 4.6  6/30/15 was 4.1  Medications       ((( Allopurinol 100 mg daily                      -reduction 7/7/2020  )))  (Prednisone 20-30 mg when necessary with a 2-3 day taper for acute attacks--- patient initiates treatment himself with this regimen)    Chronic kidney disease  ---s/p Left Nephrectomy 2006  -Transitional Cell Ca  Creatinine ranges 1.4-1.8  --- Update 11/9/2020: Most recent creatinine off hydrochlorothiazide is excellent at 1.3 on 7/17/2020.  --- Update 7/8/2020:  Most recent creatinine on 1/2/2020 creatinine had risen to 2.6.  Seen by nephrology 7/7/2020 and felt to be volume contraction due to dehydration from diuretic.  All antihypertensive meds and diuretic were discontinued yesterday.  Lab testing was ordered by his nephrologist for 2 weeks from now.  9/18/2019 was stable at 1.7  5/16/2019 was stable at 1.8  11/13/18 was 1.3  -this is the best it has been in years.  3/20/18 was 1.5  6/29/17 was 1.6  2/15/17 was 1.6  8/18/16 was 1.6  5/25/16 was 1.7  12/30/15 was 1.4  4/1/15  was 1.6    History of colon polyp    History of intermittent diverticulitis  -Treated with Augmentin or goes to Cipro/Flagyl if persistent symptoms    Low back pain  -Chronic degenerative scoliosis    Hypomagnesemia  --Patient takes an OTC magnesium supplement.  -------------------    Acute problem August 2016 -- diplopia  Progress note:  Over the last 6 months she has had at least 3 episodes of very brief diplopia lasting 1-2 minutes.  He reports if he shuts either eye the sensation is eliminated. With both eyes open it occurs.  This resolves  spontaneously within 2 minutes.  Patient denies any other associated neurological symptoms including: He has not had weakness, fatigue, dizziness, speech trouble, etc....  Follow-up: Patient saw his ophthalmologist who examined him and ordered an MRI of the brain which was unremarkable and carotid Doppler study which was normal. He suspected that he had transient extraocular muscle weakness for unclear reasons.  He has not had a recurrence of his symptoms since July 2016.    Hosp ER Eval  9/22/15:  non-spec ABD Pain w normal CT ABD.    -------------------    Preventive health care and Screening summary:   UPDATE  3/6/17  Colonoscopy  -August 2014  Flu vaccine  2014; 2015 HD  -pharm 12/16/15 for HD; high-dose 12/5/16  Pneumovax  Booster 2011; Prevnar 13 given 2/23/15  dT Booster  Tdap March 2012  Zostavax  --patient did not get this from the pharmacy as ordered and now he wants to await the second generation Zostavax from a different  which may have better efficacy--possibly coming later in 2017  PSA  -2.02  8/26/15--note patient is now over the age of 81 so that screening PSA no longer recommended        The following have been reviewed and updated as appropriate in this visit:          Past Medical History:   Diagnosis Date    Abnormal EKG     Arrhythmia     Chronic kidney disease     Disease of thyroid gland     1991    Diverticulitis of colon     Diverticulosis    GI (gastrointestinal bleed)     X3   last 2015    Heart murmur     Hyperlipidemia     Hypertension     Infectious viral hepatitis     Lipid disorder     Prostate enlargement     Renal failure        Past Surgical History:   Procedure Laterality Date    HERNIA REPAIR      7/2001    HIP FRACTURE SURGERY Left 11/27/2020    KIDNEY SURGERY Left     kidney cancer       Family History   Problem Relation Age of Onset    Lung cancer Biological Mother     Leukemia Biological Father        Social History     Socioeconomic History    Marital status:       Spouse name: Not on file    Number of children: 4    Years of education: Not on file    Highest education level: Not on file   Occupational History    Not on file   Tobacco Use    Smoking status: Former     Types: Cigarettes     Quit date: 1970     Years since quittin.4    Smokeless tobacco: Never    Tobacco comments:     PIPE    Vaping Use    Vaping Use: Never used   Substance and Sexual Activity    Alcohol use: Yes     Comment: [place]/daily    Drug use: No    Sexual activity: Not Currently   Other Topics Concern    Not on file   Social History Narrative    Not on file     Social Determinants of Health     Financial Resource Strain: Not on file   Food Insecurity: Not on file   Transportation Needs: Not on file   Physical Activity: Not on file   Stress: Not on file   Social Connections: Not on file   Intimate Partner Violence: Not on file   Housing Stability: Not on file       Allergies   Allergen Reactions    Griseofulvin      Other reaction(s): UNKNOWN  fulvicin     Tetracycline Rash and Other (see comments)     minocin        Current Outpatient Medications   Medication Sig Dispense Refill    amLODIPine (NORVASC) 5 mg tablet Take 5 mg by mouth daily.      diphenoxylate-atropine (LomotiL) 2.5-0.025 mg per tablet Take 1 tablet by mouth daily as needed for diarrhea. 10 tablet 1    finasteride (PROSCAR) 5 mg tablet Take 5 mg by mouth daily.      hydrochlorothiazide 12.5 mg tablet Take 6.25 mg by mouth daily.      lisinopriL (PRINIVIL) 40 mg tablet Take 80 mg by mouth daily.      sildenafiL, pulm.hypertension, 20 mg tablet take 5 tablets by mouth as directed if needed      simvastatin (ZOCOR) 40 mg tablet Take 1 tablet by mouth daily.  0    predniSONE (DELTASONE) 10 mg tablet take 1 tablet by mouth twice a day as directed      psyllium husk (METAMUCIL ORAL) Take 2 capsules by mouth daily. Pt is unaware of dosage       No current facility-administered medications for this visit.       Review of  Systems   Constitutional:  Negative for chills and fever.   HENT:  Positive for hearing loss. Negative for facial swelling.    Eyes:  Negative for discharge.   Respiratory:  Negative for apnea, cough, chest tightness and shortness of breath.    Cardiovascular:  Positive for leg swelling. Negative for chest pain and palpitations.        Palpitations and premature beats have resolved completely it appears.   Gastrointestinal:  Negative for abdominal pain, anal bleeding and rectal pain.   Endocrine: Negative for heat intolerance and polydipsia.   Genitourinary:  Negative for decreased urine volume, difficulty urinating and hematuria.   Musculoskeletal:  Negative for joint swelling.   Skin:  Negative for color change.   Allergic/Immunologic: Negative for environmental allergies and food allergies.   Neurological:  Negative for seizures, syncope, facial asymmetry and speech difficulty.   Hematological:  Negative for adenopathy.   Psychiatric/Behavioral:  Negative for agitation. The patient is not hyperactive.        Objective     Vitals:    05/15/24 1057   BP: 124/70   Pulse: 64   Resp: 16   SpO2: 97%         Physical Exam  Constitutional:       Appearance: Normal appearance. He is well-developed.   HENT:      Head: Normocephalic.   Cardiovascular:      Rate and Rhythm: Normal rate and regular rhythm.      Heart sounds: Normal heart sounds.   Pulmonary:      Effort: Pulmonary effort is normal. No respiratory distress.      Breath sounds: Normal breath sounds. No wheezing or rales.   Abdominal:      General: Bowel sounds are normal.      Palpations: Abdomen is soft.      Tenderness: There is no abdominal tenderness.   Musculoskeletal:      Right lower leg: No edema.      Left lower leg: No edema.   Neurological:      Mental Status: He is alert.         Lab Results   Component Value Date    WBC 6.95 01/01/2024    HGB 13.8 01/01/2024    HCT 42.1 01/01/2024    .4 (H) 01/01/2024     01/01/2024         Chemistry   "      Component Value Date/Time     03/12/2024 1138    K 4.9 03/12/2024 1138     03/12/2024 1138    CO2 26 03/12/2024 1138    BUN 35 (H) 03/12/2024 1138    CREATININE 1.6 (H) 03/12/2024 1138        Component Value Date/Time    CALCIUM 10.0 03/12/2024 1138    ALKPHOS 65 03/12/2024 1138    AST 22 03/12/2024 1138    ALT 18 03/12/2024 1138    BILITOT 0.8 03/12/2024 1138            Lab Results   Component Value Date    CHOL 147 03/12/2024    CHOL 146 09/14/2022    CHOL 158 11/17/2021     Lab Results   Component Value Date    HDL 67 03/12/2024    HDL 62 09/14/2022    HDL 62 11/17/2021     Lab Results   Component Value Date    LDLCALC 71 03/12/2024    LDLCALC 78 09/14/2022    LDLCALC 87 11/17/2021     Lab Results   Component Value Date    TRIG 44 03/12/2024    TRIG 30 09/14/2022    TRIG 47 11/17/2021     No results found for: \"CHOLHDL\"    Lab Results   Component Value Date    TSH 1.61 11/13/2018       Lab Results   Component Value Date    HGBA1C 4.8 08/26/2015       No results found for: \"HAV\", \"HEPAIGM\", \"HEPBIGM\", \"HEPBCAB\", \"HBEAG\", \"HEPCAB\"    No results found for: \"MICROALBUR\", \"[id number]\"    Assessment/Plan   1. Benign essential hypertension    2. Dyspnea on exertion    3. Gastroesophageal reflux disease without esophagitis    4. Stage 3a chronic kidney disease (CMS/HCC)    5. Mixed hyperlipidemia    6. Hypomagnesemia    7. Age related osteoporosis, unspecified pathological fracture presence    8. Nonrheumatic aortic valve insufficiency    9. Large hiatal hernia    10. History of gout    11. Weight loss        Problem List Items Addressed This Visit          Respiratory    Dyspnea on exertion    Large hiatal hernia       Circulatory    Benign essential hypertension - Primary (Chronic)    Overview     Overview:          Relevant Medications    hydrochlorothiazide 12.5 mg tablet    Other Relevant Orders    Comprehensive metabolic panel    TSH 3rd Generation    Nonrheumatic aortic valve insufficiency " (Chronic)       Digestive    Esophageal reflux    Overview     Overview:             Genitourinary    Chronic kidney disease, stage III (moderate) (CMS/[facility])    Overview     Overview:          Relevant Medications    hydrochlorothiazide 12.5 mg tablet       Musculoskeletal    Age related osteoporosis       Other    Mixed hyperlipidemia (Chronic)    Hypomagnesemia    Current Assessment & Plan       __________________________________________  CONSOLIDATED ASSESSMENT AND PLAN:  -the below A/P is the consolidated plan for the above conditions:  _____  Hypertension:   --Blood pressure well controlled on current regimen.  This with reduction of amlodipine to 5 mg daily and adding hydrochlorothiazide 6.25 mg daily in the morning.  Recorded at 124/70 in the office today.   Pt has also lost weight since last visit.  Perhaps 6 pounds.    We discussed that this may be causing his blood pressure to be at times as low as  over 50s.    Recommend patient reduce hydrochlorothiazide 6.25 mg to 4 days/week.  If blood pressure still trending below 110 might also reduce amlodipine to 2.5.  His recent weight loss may be accounting for his blood pressure being overall lower.  Will check CMP, TSH, and Uric Acid.     Weight loss  -- Patient reports perhaps slightly decreased caloric intake but normal appetite and no other symptoms worrisome for causes of weight loss such as abdominal pain.  We discussed that as people approach 89 often her appetite drops off of bed causing 5 to 10 pounds of weight loss.  He will continue to monitor his appetite and his weights.  His weight loss may be accounting for slightly lower blood pressure overall as above.    Hypercholesterolemia:   --This has been well controlled on 3/12/24 Total Cholesterol was 147, HDL 67, LDL 71. This appears to be a primary prevention strategy but he has done extremely well with it so despite his age of 88 he is opted to continue current treatment.    Chronic kidney  disease:   --Creatinine has been stable most recently on 3/12/24 at 1.6    Osteoporosis:   --Stable at this time.     GERD:   --Patient only takes Prilosec PRN. He has not needed to take it recently.    Edema:   --Reduce evening amlodipine to 2.5 mg and Hydrochlorothizide to 4 days a week while monitoring BP.     Vaccination counseling:  Recommend COVID booster in the next few weeks as recommended by the CDC this spring.  Also discussed Shingrix vaccine which he has never gotten.  Generally recommend this.  RSV and COVID new booster in the fall.    By signing my name below, I, [employer] Priyanka, attest that this documentation has been prepared under the direction and in the presence of MD Marek Tinoco  5/15/2024 11:14 AM    Initial scribing of the note was done by hardy and final editing and complete note scribing was done by Dr. Elliott.      48    minutes on this date of service was spent by Dr. Elliott  performing the following activities: Previsit medical record review, diagnostic testing review, medical consult report review, obtaining history from patient, performing examination, entering orders, comprehensive medication reconciliation, counseling regarding evaluation and management of the above conditions as detailed above including discussion around weight loss concerns and management of hypertension as above, and documentation of this visit.            Other Visit Diagnoses       History of gout        Relevant Orders    Uric acid    Weight loss        Relevant Orders    TSH 3rd Generation              No follow-ups on file.    Orders Placed This Encounter   Procedures    Comprehensive metabolic panel     Standing Status:   Future     Number of Occurrences:   1     Standing Expiration Date:   5/15/2025     Order Specific Question:   Release to patient     Answer:   Immediate [1]    Uric acid     Standing Status:   Future     Number of Occurrences:   1     Standing Expiration  Date:   5/15/2025     Order Specific Question:   Release to patient     Answer:   Immediate [1]    TSH 3rd Generation     Standing Status:   Future     Number of Occurrences:   1     Standing Expiration Date:   5/15/2025     Order Specific Question:   Release to patient     Answer:   Immediate [1]         Alex Elliott MD  5/15/2024

## 2024-05-20 ENCOUNTER — TELEPHONE (OUTPATIENT)
Dept: PRIMARY CARE | Facility: CLINIC | Age: 89
End: 2024-05-20
Payer: MEDICARE

## 2024-05-20 NOTE — TELEPHONE ENCOUNTER
----- Message from Alex Elliott MD sent at 5/17/2024  5:40 PM EDT -----  Please call Pt that lab results were good.  Renal function stable w Cr 1.6.  Thyroid normal.  Thanks

## 2024-07-12 ENCOUNTER — OFFICE VISIT (OUTPATIENT)
Dept: CARDIOLOGY | Facility: CLINIC | Age: 89
End: 2024-07-12
Payer: MEDICARE

## 2024-07-12 VITALS
WEIGHT: 138 LBS | HEART RATE: 73 BPM | BODY MASS INDEX: 24.45 KG/M2 | HEIGHT: 63 IN | DIASTOLIC BLOOD PRESSURE: 64 MMHG | SYSTOLIC BLOOD PRESSURE: 98 MMHG

## 2024-07-12 DIAGNOSIS — I10 BENIGN ESSENTIAL HYPERTENSION: Primary | ICD-10-CM

## 2024-07-12 LAB
ATRIAL RATE: 73
P AXIS: 38
PR INTERVAL: 170
QRS DURATION: 74
QT INTERVAL: 380
QTC CALCULATION(BAZETT): 418
R AXIS: 40
T WAVE AXIS: 55
VENTRICULAR RATE: 73

## 2024-07-12 PROCEDURE — 99214 OFFICE O/P EST MOD 30 MIN: CPT | Performed by: INTERNAL MEDICINE

## 2024-07-12 PROCEDURE — 93000 ELECTROCARDIOGRAM COMPLETE: CPT | Performed by: INTERNAL MEDICINE

## 2024-07-12 ASSESSMENT — ENCOUNTER SYMPTOMS
LOSS OF BALANCE: 0
FEVER: 0
PSYCHIATRIC NEGATIVE: 1
HEADACHES: 0
HOARSE VOICE: 0
FREQUENCY: 0
DIZZINESS: 0
WEIGHT GAIN: 0
CHANGE IN BOWEL HABIT: 0
DOUBLE VISION: 0
IRREGULAR HEARTBEAT: 0
ABDOMINAL PAIN: 1
BRUISES/BLEEDS EASILY: 0
PND: 0
DECREASED APPETITE: 0
DYSURIA: 0
HEARTBURN: 0
PALPITATIONS: 0
WEIGHT LOSS: 0
SHORTNESS OF BREATH: 0
BLOATING: 0
ENDOCRINE NEGATIVE: 1

## 2024-07-12 NOTE — LETTER
July 12, 2024     Alex Elliott MD  [address]  [place] [address]    Patient: Efrain Cardona Jr.  YOB: 1934  Date of Visit: 7/12/2024      Dear Dr. Elliott:    Thank you for referring Efrain Cardona Jr. to me for evaluation. Below are my notes for this consultation.    If you have questions, please do not hesitate to call me. I look forward to following your patient along with you.         Sincerely,        Timoteo Kim MD        CC: MD Charles Torres MD Donald S Miller, MD David J Ellis, MD Samantha R Witte, MD Coady, Timoteo WAN MD  7/12/2024 11:30 AM  Sign when Signing Visit      Cardiology Consult     Reason for visit: Scheduled cardiology follow-up    Past medical history  Extrasystoles  Status post nephrectomy  Chronic renal insufficiency  Mild aortic valve insufficiency  Mild systemic hypertension     HPI     Efrain Cardona Jr. is a 89 y.o. male  who presents for follow-up.     No specific cardiovascular complaints.  He continues to enjoy an active vigorous lifestyle exercising vigorously 3 times a week.  He does not have angina or excessive dyspnea.  His exercise regimen has not declined.      In response to transient ankle edema related to amlodipine, particularly in the winter months, he has made some adjustments in his medications which have been well-tolerated and effective.  He is reduce his amlodipine dose from 10 mg to 2.5 mg.  He has reinstituted low-dose thiazide diuretic treatment with HCTZ at 6.25 mg a day.  This regimen has been well-tolerated and effective and has not prompted any decline in his renal function.    Other issues in his health revolved around an episode of abdominal pain.  He underwent a CAT scan revealing diverticulosis but no diverticulitis or other abnormal findings in the lower abdomen.  It did demonstrate enlargement of the known hiatal hernia with some suspicion that there is also a portion of  the bowel in the chest cavity.  He sought surgical consultation for this finding and has been advised not to pursue other than observation and conservative management in the absence of any worrisome symptoms.    Past Medical History:   Diagnosis Date   • Abnormal EKG    • Arrhythmia    • Chronic kidney disease    • Disease of thyroid gland     1991   • Diverticulitis of colon     Diverticulosis   • GI (gastrointestinal bleed)     X3   last 2015   • Heart murmur    • Hyperlipidemia    • Hypertension    • Infectious viral hepatitis    • Lipid disorder    • Prostate enlargement    • Renal failure      Past Surgical History:   Procedure Laterality Date   • HERNIA REPAIR      7/2001   • HIP FRACTURE SURGERY Left 11/27/2020   • KIDNEY SURGERY Left     kidney cancer     Griseofulvin and Tetracycline  Current Outpatient Medications   Medication Sig Dispense Refill   • amLODIPine (NORVASC) 5 mg tablet Take by mouth daily. Pt takes 2.5 mg or 5 mg based on BP readings     • diphenoxylate-atropine (LomotiL) 2.5-0.025 mg per tablet Take 1 tablet by mouth daily as needed for diarrhea. 10 tablet 1   • finasteride (PROSCAR) 5 mg tablet Take 5 mg by mouth daily.     • hydrochlorothiazide (HYDRODIURIL) 25 mg tablet Take 6.25 mg by mouth daily.     • lisinopriL (PRINIVIL) 40 mg tablet Take 40 mg by mouth daily.     • predniSONE (DELTASONE) 10 mg tablet take 1 tablet by mouth twice a day as directed     • psyllium husk (METAMUCIL ORAL) Take 2 capsules by mouth daily. Pt is unaware of dosage     • sildenafiL, pulm.hypertension, 20 mg tablet take 5 tablets by mouth as directed if needed     • simvastatin (ZOCOR) 40 mg tablet Take 1 tablet by mouth daily.  0     No current facility-administered medications for this visit.     Social History     Socioeconomic History   • Marital status:      Spouse name: None   • Number of children: 4   • Years of education: None   • Highest education level: None   Tobacco Use   • Smoking status:  Former     Types: Cigarettes     Quit date: 1970     Years since quittin.5   • Smokeless tobacco: Never   • Tobacco comments:     PIPE    Vaping Use   • Vaping Use: Never used   Substance and Sexual Activity   • Alcohol use: Yes     Comment: [place]/daily   • Drug use: No   • Sexual activity: Not Currently     Family History   Problem Relation Age of Onset   • Lung cancer Biological Mother    • Leukemia Biological Father         Review of Systems   Constitutional: Negative for decreased appetite, fever, weight gain and weight loss.   HENT:  Negative for hearing loss and hoarse voice.    Eyes:  Negative for double vision and visual disturbance.   Cardiovascular:  Positive for leg swelling. Negative for chest pain, irregular heartbeat, palpitations and paroxysmal nocturnal dyspnea.   Respiratory:  Negative for shortness of breath.    Endocrine: Negative.    Hematologic/Lymphatic: Negative for bleeding problem. Does not bruise/bleed easily.   Skin:  Negative for rash and skin cancer.   Musculoskeletal:  Negative for arthritis and joint pain.   Gastrointestinal:  Positive for abdominal pain. Negative for bloating, change in bowel habit and heartburn.   Genitourinary:  Negative for dysuria, frequency and nocturia.   Neurological:  Negative for dizziness, headaches and loss of balance.   Psychiatric/Behavioral: Negative.     Allergic/Immunologic: Negative for environmental allergies.      Objective   Vitals:    24 1045   BP: 98/64   Pulse: 73   Weight 138.pounds  Blood pressure seated position standard cuff 104/66. no difference right left arms.  No orthostatic change.    Physical Exam  Vitals and nursing note reviewed.   Constitutional:       General: He is not in acute distress.     Comments: Pleasant fit appearing man not acutely distressed.   HENT:      Head: Normocephalic and atraumatic.      Right Ear: External ear normal.      Left Ear: External ear normal.      Nose: Nose normal.      Mouth/Throat:       Mouth: Mucous membranes are dry.      Pharynx: Oropharynx is clear.   Eyes:      General: No scleral icterus.     Conjunctiva/sclera: Conjunctivae normal.   Neck:      Thyroid: No thyromegaly.      Vascular: No carotid bruit or JVD.   Cardiovascular:      Rate and Rhythm: Normal rate and regular rhythm.      Pulses: Normal pulses and intact distal pulses.      Heart sounds: Murmur (Soft systolic murmur along the right sternal border.  No diastolic murmur heard at rest or provoked with maneuver.) heard.      No gallop.   Pulmonary:      Effort: Pulmonary effort is normal.      Breath sounds: Normal breath sounds. No wheezing.   Abdominal:      General: Bowel sounds are normal.      Palpations: Abdomen is soft.      Tenderness: There is no abdominal tenderness. There is no rebound.   Musculoskeletal:         General: Normal range of motion.      Cervical back: Normal range of motion and neck supple.      Right lower leg: Edema (Trace around the malleolus) present.      Left lower leg: No edema.      Comments: Mild scoliosis of the lumbar spine.   Skin:     General: Skin is warm and dry.      Capillary Refill: Capillary refill takes less than 2 seconds.   Neurological:      General: No focal deficit present.      Mental Status: He is alert and oriented to person, place, and time.   Psychiatric:         Mood and Affect: Mood normal.         Behavior: Behavior normal.         Thought Content: Thought content normal.         Judgment: Judgment normal.       Labs   Lab Results   Component Value Date    WBC 6.95 01/01/2024    HGB 13.8 01/01/2024    HCT 42.1 01/01/2024     01/01/2024    CHOL 147 03/12/2024    TRIG 44 03/12/2024    HDL 67 03/12/2024    ALT 17 05/15/2024    AST 20 05/15/2024     05/15/2024    K 4.4 05/15/2024     05/15/2024    CREATININE 1.6 (H) 05/15/2024    BUN 32 (H) 05/15/2024    CO2 27 05/15/2024    TSH 1.32 05/15/2024    PSA 2.02 08/26/2015    INR 1.1 11/28/2020    HGBA1C 4.8  08/26/2015   ECG     Transthoracic echo March 12, 2024:  1.  Technically adequate study.  2.  Normal sinus rhythm.  3.  Normal left ventricular cavity size with normal wall thickness, normal wall motion and preserved systolic function.  Estimated ejection fraction 60%.  4.  Normal right ventricular cavity size with normal wall thickness and preserved systolic function.  5.  Normal left atrial size.  Mild annular calcification.  Thickened mitral leaflets.  Trace mitral regurgitation  6.  Normal right atrial size.  Trace to mild tricuspid regurgitation.  7.  Trileaflet aortic valve with mild thickening.  Normal leaflet excursion.  No stenosis.  Trace central aortic insufficiency.  8.  Compared to a study of October 10, 2017, no significant change.     Assessment and plan:    1.  Occasional extrasystoles on EKG. Resolved.         2.  Hypertension.  Adequately controlled on his current medications.  No changes were advised.  The combination of low-dose amlodipine and very low-dose HCTZ has worked out well.    3.  Aortic insufficiency.  No change in the murmur on exam.  No symptoms or exam findings of congestive heart failure.  The echo in March demonstrated no worsening of this abnormality.    4.  Single kidney status post nephrectomy.  Stable creatinine as of recent laboratory studies.    5.  Gastrointestinal bleeding secondary to aspirin.  No clear etiology found despite aggressive GI workup.  He is off aspirin and his hemoglobin is stable.    6.  History of thyroiditis.  Recent laboratory studies reveal normal TSH.    7.  Status post traumatic hip fracture.  Doing well status post surgical repair.    8.  History of dyslipidemia.  Excellent cholesterol profile in response to his current dietary habits and exercise regimen.    9.  Large hiatal hernia.  Currently asymptomatic.    I hope to see him in 12 months in follow-up to today's visit. He is encouraged to call with any questions regarding blood pressure management  or other cardiovascular issues.     Timoteo Kim MD  7/12/2024

## 2024-07-12 NOTE — PROGRESS NOTES
Cardiology Consult     Reason for visit: Scheduled cardiology follow-up    Past medical history  Extrasystoles  Status post nephrectomy  Chronic renal insufficiency  Mild aortic valve insufficiency  Mild systemic hypertension     HPI     Efrain Cardona Jr. is a 89 y.o. male  who presents for follow-up.     No specific cardiovascular complaints.  He continues to enjoy an active vigorous lifestyle exercising vigorously 3 times a week.  He does not have angina or excessive dyspnea.  His exercise regimen has not declined.      In response to transient ankle edema related to amlodipine, particularly in the winter months, he has made some adjustments in his medications which have been well-tolerated and effective.  He is reduce his amlodipine dose from 10 mg to 2.5 mg.  He has reinstituted low-dose thiazide diuretic treatment with HCTZ at 6.25 mg a day.  This regimen has been well-tolerated and effective and has not prompted any decline in his renal function.    Other issues in his health revolved around an episode of abdominal pain.  He underwent a CAT scan revealing diverticulosis but no diverticulitis or other abnormal findings in the lower abdomen.  It did demonstrate enlargement of the known hiatal hernia with some suspicion that there is also a portion of the bowel in the chest cavity.  He sought surgical consultation for this finding and has been advised not to pursue other than observation and conservative management in the absence of any worrisome symptoms.    Past Medical History:   Diagnosis Date    Abnormal EKG     Arrhythmia     Chronic kidney disease     Disease of thyroid gland     1991    Diverticulitis of colon     Diverticulosis    GI (gastrointestinal bleed)     X3   last 2015    Heart murmur     Hyperlipidemia     Hypertension     Infectious viral hepatitis     Lipid disorder     Prostate enlargement     Renal failure      Past Surgical History:   Procedure Laterality Date    HERNIA REPAIR       2001    HIP FRACTURE SURGERY Left 2020    KIDNEY SURGERY Left     kidney cancer     Griseofulvin and Tetracycline  Current Outpatient Medications   Medication Sig Dispense Refill    amLODIPine (NORVASC) 5 mg tablet Take by mouth daily. Pt takes 2.5 mg or 5 mg based on BP readings      diphenoxylate-atropine (LomotiL) 2.5-0.025 mg per tablet Take 1 tablet by mouth daily as needed for diarrhea. 10 tablet 1    finasteride (PROSCAR) 5 mg tablet Take 5 mg by mouth daily.      hydrochlorothiazide (HYDRODIURIL) 25 mg tablet Take 6.25 mg by mouth daily.      lisinopriL (PRINIVIL) 40 mg tablet Take 40 mg by mouth daily.      predniSONE (DELTASONE) 10 mg tablet take 1 tablet by mouth twice a day as directed      psyllium husk (METAMUCIL ORAL) Take 2 capsules by mouth daily. Pt is unaware of dosage      sildenafiL, pulm.hypertension, 20 mg tablet take 5 tablets by mouth as directed if needed      simvastatin (ZOCOR) 40 mg tablet Take 1 tablet by mouth daily.  0     No current facility-administered medications for this visit.     Social History     Socioeconomic History    Marital status:      Spouse name: None    Number of children: 4    Years of education: None    Highest education level: None   Tobacco Use    Smoking status: Former     Types: Cigarettes     Quit date: 1970     Years since quittin.5    Smokeless tobacco: Never    Tobacco comments:     PIPE    Vaping Use    Vaping Use: Never used   Substance and Sexual Activity    Alcohol use: Yes     Comment: [place]/daily    Drug use: No    Sexual activity: Not Currently     Family History   Problem Relation Age of Onset    Lung cancer Biological Mother     Leukemia Biological Father         Review of Systems   Constitutional: Negative for decreased appetite, fever, weight gain and weight loss.   HENT:  Negative for hearing loss and hoarse voice.    Eyes:  Negative for double vision and visual disturbance.   Cardiovascular:  Positive for leg swelling.  Negative for chest pain, irregular heartbeat, palpitations and paroxysmal nocturnal dyspnea.   Respiratory:  Negative for shortness of breath.    Endocrine: Negative.    Hematologic/Lymphatic: Negative for bleeding problem. Does not bruise/bleed easily.   Skin:  Negative for rash and skin cancer.   Musculoskeletal:  Negative for arthritis and joint pain.   Gastrointestinal:  Positive for abdominal pain. Negative for bloating, change in bowel habit and heartburn.   Genitourinary:  Negative for dysuria, frequency and nocturia.   Neurological:  Negative for dizziness, headaches and loss of balance.   Psychiatric/Behavioral: Negative.     Allergic/Immunologic: Negative for environmental allergies.      Objective   Vitals:    07/12/24 1045   BP: 98/64   Pulse: 73   Weight 138.pounds  Blood pressure seated position standard cuff 104/66. no difference right left arms.  No orthostatic change.    Physical Exam  Vitals and nursing note reviewed.   Constitutional:       General: He is not in acute distress.     Comments: Pleasant fit appearing man not acutely distressed.   HENT:      Head: Normocephalic and atraumatic.      Right Ear: External ear normal.      Left Ear: External ear normal.      Nose: Nose normal.      Mouth/Throat:      Mouth: Mucous membranes are dry.      Pharynx: Oropharynx is clear.   Eyes:      General: No scleral icterus.     Conjunctiva/sclera: Conjunctivae normal.   Neck:      Thyroid: No thyromegaly.      Vascular: No carotid bruit or JVD.   Cardiovascular:      Rate and Rhythm: Normal rate and regular rhythm.      Pulses: Normal pulses and intact distal pulses.      Heart sounds: Murmur (Soft systolic murmur along the right sternal border.  No diastolic murmur heard at rest or provoked with maneuver.) heard.      No gallop.   Pulmonary:      Effort: Pulmonary effort is normal.      Breath sounds: Normal breath sounds. No wheezing.   Abdominal:      General: Bowel sounds are normal.      Palpations:  Abdomen is soft.      Tenderness: There is no abdominal tenderness. There is no rebound.   Musculoskeletal:         General: Normal range of motion.      Cervical back: Normal range of motion and neck supple.      Right lower leg: Edema (Trace around the malleolus) present.      Left lower leg: No edema.      Comments: Mild scoliosis of the lumbar spine.   Skin:     General: Skin is warm and dry.      Capillary Refill: Capillary refill takes less than 2 seconds.   Neurological:      General: No focal deficit present.      Mental Status: He is alert and oriented to person, place, and time.   Psychiatric:         Mood and Affect: Mood normal.         Behavior: Behavior normal.         Thought Content: Thought content normal.         Judgment: Judgment normal.       Labs   Lab Results   Component Value Date    WBC 6.95 01/01/2024    HGB 13.8 01/01/2024    HCT 42.1 01/01/2024     01/01/2024    CHOL 147 03/12/2024    TRIG 44 03/12/2024    HDL 67 03/12/2024    ALT 17 05/15/2024    AST 20 05/15/2024     05/15/2024    K 4.4 05/15/2024     05/15/2024    CREATININE 1.6 (H) 05/15/2024    BUN 32 (H) 05/15/2024    CO2 27 05/15/2024    TSH 1.32 05/15/2024    PSA 2.02 08/26/2015    INR 1.1 11/28/2020    HGBA1C 4.8 08/26/2015   ECG     Transthoracic echo March 12, 2024:  1.  Technically adequate study.  2.  Normal sinus rhythm.  3.  Normal left ventricular cavity size with normal wall thickness, normal wall motion and preserved systolic function.  Estimated ejection fraction 60%.  4.  Normal right ventricular cavity size with normal wall thickness and preserved systolic function.  5.  Normal left atrial size.  Mild annular calcification.  Thickened mitral leaflets.  Trace mitral regurgitation  6.  Normal right atrial size.  Trace to mild tricuspid regurgitation.  7.  Trileaflet aortic valve with mild thickening.  Normal leaflet excursion.  No stenosis.  Trace central aortic insufficiency.  8.  Compared to a study  of October 10, 2017, no significant change.     Assessment and plan:    1.  Occasional extrasystoles on EKG. Resolved.         2.  Hypertension.  Adequately controlled on his current medications.  No changes were advised.  The combination of low-dose amlodipine and very low-dose HCTZ has worked out well.    3.  Aortic insufficiency.  No change in the murmur on exam.  No symptoms or exam findings of congestive heart failure.  The echo in March demonstrated no worsening of this abnormality.    4.  Single kidney status post nephrectomy.  Stable creatinine as of recent laboratory studies.    5.  Gastrointestinal bleeding secondary to aspirin.  No clear etiology found despite aggressive GI workup.  He is off aspirin and his hemoglobin is stable.    6.  History of thyroiditis.  Recent laboratory studies reveal normal TSH.    7.  Status post traumatic hip fracture.  Doing well status post surgical repair.    8.  History of dyslipidemia.  Excellent cholesterol profile in response to his current dietary habits and exercise regimen.    9.  Large hiatal hernia.  Currently asymptomatic.    I hope to see him in 12 months in follow-up to today's visit. He is encouraged to call with any questions regarding blood pressure management or other cardiovascular issues.     Timoteo Kim MD  7/12/2024

## 2024-09-18 ENCOUNTER — APPOINTMENT (OUTPATIENT)
Dept: LAB | Facility: CLINIC | Age: 89
End: 2024-09-18
Attending: INTERNAL MEDICINE
Payer: MEDICARE

## 2024-09-18 ENCOUNTER — OFFICE VISIT (OUTPATIENT)
Dept: PRIMARY CARE | Facility: CLINIC | Age: 89
End: 2024-09-18
Payer: MEDICARE

## 2024-09-18 ENCOUNTER — TELEPHONE (OUTPATIENT)
Dept: PRIMARY CARE | Facility: CLINIC | Age: 89
End: 2024-09-18

## 2024-09-18 VITALS
BODY MASS INDEX: 24.17 KG/M2 | SYSTOLIC BLOOD PRESSURE: 138 MMHG | RESPIRATION RATE: 16 BRPM | WEIGHT: 136.4 LBS | OXYGEN SATURATION: 98 % | HEART RATE: 75 BPM | DIASTOLIC BLOOD PRESSURE: 66 MMHG | HEIGHT: 63 IN

## 2024-09-18 DIAGNOSIS — I10 BENIGN ESSENTIAL HYPERTENSION: Chronic | ICD-10-CM

## 2024-09-18 DIAGNOSIS — N40.0 BENIGN PROSTATIC HYPERPLASIA, UNSPECIFIED WHETHER LOWER URINARY TRACT SYMPTOMS PRESENT: ICD-10-CM

## 2024-09-18 DIAGNOSIS — N18.31 STAGE 3A CHRONIC KIDNEY DISEASE (CMS/HCC): Chronic | ICD-10-CM

## 2024-09-18 DIAGNOSIS — R11.0 NAUSEA: ICD-10-CM

## 2024-09-18 DIAGNOSIS — K21.9 GASTROESOPHAGEAL REFLUX DISEASE WITHOUT ESOPHAGITIS: ICD-10-CM

## 2024-09-18 DIAGNOSIS — M81.0 AGE RELATED OSTEOPOROSIS, UNSPECIFIED PATHOLOGICAL FRACTURE PRESENCE: ICD-10-CM

## 2024-09-18 DIAGNOSIS — I10 BENIGN ESSENTIAL HYPERTENSION: Primary | Chronic | ICD-10-CM

## 2024-09-18 DIAGNOSIS — R63.4 WEIGHT LOSS: ICD-10-CM

## 2024-09-18 DIAGNOSIS — K44.9 LARGE HIATAL HERNIA: ICD-10-CM

## 2024-09-18 DIAGNOSIS — E78.2 MIXED HYPERLIPIDEMIA: Chronic | ICD-10-CM

## 2024-09-18 LAB
ALBUMIN SERPL-MCNC: 4.4 G/DL (ref 3.5–5.7)
ALP SERPL-CCNC: 69 IU/L (ref 34–125)
ALT SERPL-CCNC: 17 IU/L (ref 7–52)
ANION GAP SERPL CALC-SCNC: 7 MEQ/L (ref 3–15)
AST SERPL-CCNC: 21 IU/L (ref 13–39)
BILIRUB SERPL-MCNC: 0.6 MG/DL (ref 0.3–1.2)
BUN SERPL-MCNC: 41 MG/DL (ref 7–25)
CALCIUM SERPL-MCNC: 9.4 MG/DL (ref 8.6–10.3)
CHLORIDE SERPL-SCNC: 107 MEQ/L (ref 98–107)
CO2 SERPL-SCNC: 26 MEQ/L (ref 21–31)
CREAT SERPL-MCNC: 1.7 MG/DL (ref 0.7–1.3)
EGFRCR SERPLBLD CKD-EPI 2021: 38.1 ML/MIN/1.73M*2
ERYTHROCYTE [DISTWIDTH] IN BLOOD BY AUTOMATED COUNT: 14.6 % (ref 11.6–14.4)
GLUCOSE SERPL-MCNC: 106 MG/DL (ref 70–99)
HCT VFR BLD AUTO: 42.9 % (ref 40.1–51)
HGB BLD-MCNC: 14.4 G/DL (ref 13.7–17.5)
MCH RBC QN AUTO: 33.6 PG (ref 28–33.2)
MCHC RBC AUTO-ENTMCNC: 33.6 G/DL (ref 32.2–36.5)
MCV RBC AUTO: 100 FL (ref 83–98)
PDW BLD AUTO: 10.6 FL (ref 9.4–12.4)
PLATELET # BLD AUTO: 148 K/UL (ref 150–350)
POTASSIUM SERPL-SCNC: 5.2 MEQ/L (ref 3.5–5.1)
PROT SERPL-MCNC: 7 G/DL (ref 6–8.2)
RBC # BLD AUTO: 4.29 M/UL (ref 4.5–5.8)
SODIUM SERPL-SCNC: 140 MEQ/L (ref 136–145)
WBC # BLD AUTO: 5.85 K/UL (ref 3.8–10.5)

## 2024-09-18 PROCEDURE — 99214 OFFICE O/P EST MOD 30 MIN: CPT | Performed by: INTERNAL MEDICINE

## 2024-09-18 PROCEDURE — 36415 COLL VENOUS BLD VENIPUNCTURE: CPT

## 2024-09-18 PROCEDURE — 85027 COMPLETE CBC AUTOMATED: CPT

## 2024-09-18 PROCEDURE — 80053 COMPREHEN METABOLIC PANEL: CPT

## 2024-09-18 NOTE — TELEPHONE ENCOUNTER
Pt has low magnesium. He just had his blood work done today. He wants to know if magnesium can be added to his labs.
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
## 2025-01-12 NOTE — PROGRESS NOTES
Alex Elliott MD  Geriatric Medicine                                                    GERIATRIC MEDICINE    Subjective      Patient ID: Efrain Cardona Jr. is a 84 y.o. male.    Disease Management Visit    The patient is here for Disease Management for evaluation and management of the following chronic medical problems.    New problem:  For the last month or so patient has had frequent premature beats.  Occasionally occurring in a bigeminal pattern.  Not associated with chest pain, shortness of breath, palpitations that are significant or disturbing.  Her distant history of thyroiditis but normal TSH on recent monitoring over the last several years.    And    Chronic problem review  This includes: symptom review, medication regimen review, laboratory monitoring, and review of diagnostic testing.    Hypertension  --- Update 6/25/18: Stable.  ---update 2/26/18: Stable; still occasional edema on hot days from amlodipine but he is tolerating it.  ---Update 10/30/17: home BP stable.  --- Update 7/5/17: Home BP tracking stable--however he reports some variability  --- Previous visits: BP is stable on dose reduction of Norvasc and a small dose of Toprol --summertime edema from Norvasc.  --- Previous visit:Patient notes that his home blood pressure monitoring is all from 10-20 points higher than what we check in the office.  He reports running in the systolic range of 125-140 at home.  --- Patient asked about switching from Vasotec to lisinopril do to major cost advantages.  He also notes summertime edema from Norvasc.  Medications  Toprol 12.5 mg daily  Norvasc 10 mg daily    -he does report some mild venous insufficiency edema from the higher dose of Norvasc.  Lisinopril 30 mg daily       (((   ASA   )))  --stopped due to study from [place] recommending against ASA for primary prevention    Anemia  -He has recurrent iron deficiency anemia.  --- Update 6/25/18: Stable.  Recent hemoglobin has been normal most  Screened patient for SBIRT and SDoH. No KEVIN or social needs concerns at this time.    recently 3/20/18 was 14.5.  This is normalized since he stopped aspirin.  ---update 2/26/18: EGD done by GI this summer was negative.He is due for follow-up CBC next month.  Already ordered.  --- Update 7/5/17: Patient has had no signs of GI bleeding.  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  ---His recent hemoglobin was 9/19/17 Hgb 14.3  -off ASA  2/15/17 hemoglobin was 13.1  8/18/16 was 14.1  5/25/16 was 12.8  12/30/15 was 14.1  6/30/15 was 13.9  ---Previous visit:  He has since stopped his iron for his capsule endoscopy study which was done approximately 3 weeks ago to evaluate colonic and gastric AVMs as well as the small bowel for source of blood loss.----Patient reports that his capsule endoscopy study was negative. GI recommended stopping aspirin.  ---His gastroenterologist feels this is most likely do to either gastric or colonic AVMs  Last EGD and colonoscopy were done in July 2014.    Hypercholesterolemia  -Goal of treatment is LDL around 100  Primary prevention strategy  Last lipids  9/19/17:    HDL 71  LDL 92  2/15/17: Total cholesterol 151 HDL 69 LDL 77  5/25/16: Total cholesterol 168 HDL 72 LDL 81  8/26/15: Total cholesterol 198 HDL 71   11/24/14:    HDL 79  LDL 84  Medications  Zocor 40 mg daily    GERD  Medications  Prilosec 20 mg daily    BPH  Medication  Proscar 5 mg daily    Gout  --- Update 2/26/18: No recent attacks of gout  Last uric acid 9/19/17 was 4.6  6/30/15 was 4.1  Medications  Allopurinol 200 mg daily  (Prednisone 20-30 mg when necessary with a 2-3 day taper for acute attacks--- patient initiates treatment himself with this regimen)    Chronic kidney disease  ---s/p Left Nephrectomy 2006  -Transitional Cell Ca  Creatinine ranges 1.4-1.8  Most recent creatinine on 3/20/18 was 1.5  6/29/17 was 1.6  2/15/17 was 1.6  8/18/16 was 1.6  5/25/16 was 1.7  12/30/15 was 1.4  4/1/15  was 1.6    History of colon polyp    History of  intermittent diverticulitis  -Treated with Augmentin or goes to Cipro/Flagyl if persistent symptoms    Low back pain  -Chronic degenerative scoliosis    -------------------    Acute problem August 2016 -- diplopia  Progress note:  Over the last 6 months she has had at least 3 episodes of very brief diplopia lasting 1-2 minutes.  He reports if he shuts either eye the sensation is eliminated. With both eyes open it occurs.  This resolves spontaneously within 2 minutes.  Patient denies any other associated neurological symptoms including: He has not had weakness, fatigue, dizziness, speech trouble, etc....  Follow-up: Patient saw his ophthalmologist who examined him and ordered an MRI of the brain which was unremarkable and carotid Doppler study which was normal. He suspected that he had transient extraocular muscle weakness for unclear reasons.  He has not had a recurrence of his symptoms since July 2016.    Hosp ER Eval  9/22/15:  non-spec ABD Pain w normal CT ABD.    -------------------    Preventive health care and Screening summary:   UPDATE  3/6/17  Colonoscopy  -August 2014  Flu vaccine  2014; 2015 HD  -pharm 12/16/15 for HD; high-dose 12/5/16  Pneumovax  Booster 2011; Prevnar 13 given 2/23/15  dT Booster  Tdap March 2012  Zostavax  --patient did not get this from the pharmacy as ordered and now he wants to await the second generation Zostavax from a different  which may have better efficacy--possibly coming later in 2017  PSA  -2.02  8/26/15--note patient is now over the age of 81 so that screening PSA no longer recommended        The following have been reviewed and updated as appropriate in this visit:         History reviewed. No pertinent past medical history.    History reviewed. No pertinent surgical history.    Family History   Problem Relation Age of Onset   • Lung cancer Mother    • Leukemia Father        Social History     Social History   • Marital status:      Spouse name: N/A   •  Number of children: N/A   • Years of education: N/A     Occupational History   • Not on file.     Social History Main Topics   • Smoking status: Former Smoker     Quit date: 1970   • Smokeless tobacco: Never Used      Comment: PIPE    • Alcohol use Yes      Comment: wine, daily   • Drug use: Unknown   • Sexual activity: Not on file     Other Topics Concern   • Not on file     Social History Narrative   • No narrative on file       Allergies   Allergen Reactions   • Griseofulvin      Other reaction(s): UNKNOWN  fulvicin    • Tetracycline Rash and Other (see comments)     minocin        Current Outpatient Prescriptions   Medication Sig Dispense Refill   • allopurinol (ZYLOPRIM) 100 mg tablet 100 mg. Take 2 tablets once daily      • amLODIPine (NORVASC) 10 mg tablet Take 10 mg by mouth daily.     • finasteride (PROSCAR) 5 mg tablet Take 5 mg by mouth daily.     • lisinopril (PRINIVIL) 40 mg tablet 40 mg. Take 1.5 tablets by oral route every day     • metoprolol succinate XL (TOPROL-XL) 25 mg 24 hr tablet 25 mg. Take HALF tablet by oral route every day     • omeprazole (PriLOSEC) 20 mg capsule 20 mg. 1 capsule daily 30 minutes to 1 hour before a meal     • ferrous sulfate 325 mg (65 mg iron) ER capsule 1 po BID       No current facility-administered medications for this visit.        Review of Systems   Constitutional: Negative for chills and fever.   Respiratory: Negative for cough and shortness of breath.    Cardiovascular: Positive for palpitations. Negative for chest pain.        Premature beats   Gastrointestinal: Negative for abdominal pain.   Genitourinary: Negative for hematuria.       Objective     Vitals:    06/25/18 1119   BP: 116/82   Pulse: (!) 59   Resp: 17   Temp: 36.5 °C (97.7 °F)   SpO2: 94%       Physical Exam   Constitutional: He appears well-developed and well-nourished.   HENT:   Head: Normocephalic.   Cardiovascular: Normal rate, regular rhythm and normal heart sounds.    Pulmonary/Chest: Effort  normal and breath sounds normal. No respiratory distress. He has no wheezes. He has no rales.   Abdominal: Soft. Bowel sounds are normal. There is no tenderness.   Neurological: He is alert.       Lab Results   Component Value Date    WBC 5.66 03/20/2018    HGB 14.5 03/20/2018    HCT 42.9 03/20/2018    .9 (H) 03/20/2018     03/20/2018         Chemistry        Component Value Date/Time     03/20/2018 1247    K 4.7 03/20/2018 1247     03/20/2018 1247    CO2 27 03/20/2018 1247    BUN 27 (H) 03/20/2018 1247    CREATININE 1.5 (H) 03/20/2018 1247        Component Value Date/Time    CALCIUM 9.9 03/20/2018 1247    ALKPHOS 59 03/20/2018 1247    AST 28 03/20/2018 1247    ALT 21 03/20/2018 1247    BILITOT 0.9 03/20/2018 1247            Lab Results   Component Value Date    CHOL 170 09/19/2017    CHOL 151 02/15/2017    CHOL 160 05/25/2016     Lab Results   Component Value Date    HDL 71 09/19/2017    HDL 69 02/15/2017    HDL 72 05/25/2016     Lab Results   Component Value Date    LDLCALC 92 09/19/2017    LDLCALC 77 02/15/2017    LDLCALC 81 05/25/2016     Lab Results   Component Value Date    TRIG 36 09/19/2017    TRIG 27 (L) 02/15/2017    TRIG 35 05/25/2016     No results found for: CHOLHDL    No results found for: TSH    Lab Results   Component Value Date    HGBA1C 4.8 08/26/2015       No results found for: HAV, HEPAIGM, HEPBIGM, HEPBCAB, HBEAG, HEPCAB    No results found for: MICROALBUR, [id number]    Assessment/Plan   1. Premature beats    2. Essential hypertension    3. CKD (chronic kidney disease) stage 3, GFR 30-59 ml/min    4. Mixed hyperlipidemia    5. Ventricular bigeminy      Problem List     Premature beats - Primary    Current Assessment & Plan     Patient over the last month or so describes premature cardiac beats.  Sometimes it is occurring in a bigeminal pattern.  Not associated with chest pain, shortness of breath, palpitations waking him from sleep were greatly disturbing.  Symptoms are  fairly mild in severity.  Distant history of thyroiditis but nothing recently.  Last blood work was 3 months ago but did not show any abnormalities at that time.  Recommend check basic metabolic profile to rule out new electrolyte disturbance such as hypokalemia that may be causing this.  Check TSH to rule out occult thyroid disease.  No current medications appear to be triggers and he takes no over-the-counter supplements that may be a trigger.  Recommend follow-up with cardiology due to his EKG showing normal sinus rhythm at 61 with frequent PVCs and a ventricular bigeminal pattern.  Patient has had an echocardiogram in the past which has ruled out major structural heart disease.  Cardiology consult may choose to repeat this study.  They cardiology evaluation may result in extended monitoring.  It may be reasonable after evaluation with no contributing causes identified to simply increase his dose of beta-blocker.         Relevant Orders    ECG 12 LEAD OFFICE PERFORMED (Completed)    Essential hypertension    Current Assessment & Plan     Stable on current regimen.  Continue current treatment.         Relevant Orders    Basic metabolic panel    CKD (chronic kidney disease) stage 3, GFR 30-59 ml/min    Current Assessment & Plan     Creatinine has been very stable most recently at 1.5.         Mixed hyperlipidemia    Current Assessment & Plan     Stable lipid profile.  Continue current treatment.         Ventricular bigeminy    Current Assessment & Plan     As above.         Relevant Orders    TSH 3rd Generation          No Follow-up on file.    Orders Placed This Encounter   Procedures   • Basic metabolic panel     Standing Status:   Future     Standing Expiration Date:   6/25/2019   • TSH 3rd Generation     Standing Status:   Future     Standing Expiration Date:   6/25/2019   • ECG 12 LEAD OFFICE PERFORMED     Scheduling Instructions:      PLEASE USE THIS ORDER FOR ECG'S PERFORMED IN PHYSICIAN OFFICES          Alex Elliott MD  6/25/2018

## 2025-01-27 ENCOUNTER — OFFICE VISIT (OUTPATIENT)
Dept: PRIMARY CARE | Facility: CLINIC | Age: 89
End: 2025-01-27
Payer: MEDICARE

## 2025-01-27 ENCOUNTER — APPOINTMENT (OUTPATIENT)
Dept: LAB | Facility: CLINIC | Age: 89
End: 2025-01-27
Attending: INTERNAL MEDICINE
Payer: MEDICARE

## 2025-01-27 VITALS
SYSTOLIC BLOOD PRESSURE: 118 MMHG | OXYGEN SATURATION: 97 % | BODY MASS INDEX: 24.63 KG/M2 | WEIGHT: 139 LBS | HEIGHT: 63 IN | HEART RATE: 68 BPM | DIASTOLIC BLOOD PRESSURE: 60 MMHG | RESPIRATION RATE: 16 BRPM

## 2025-01-27 DIAGNOSIS — K21.9 GASTROESOPHAGEAL REFLUX DISEASE WITHOUT ESOPHAGITIS: ICD-10-CM

## 2025-01-27 DIAGNOSIS — M81.0 AGE RELATED OSTEOPOROSIS, UNSPECIFIED PATHOLOGICAL FRACTURE PRESENCE: ICD-10-CM

## 2025-01-27 DIAGNOSIS — E78.2 MIXED HYPERLIPIDEMIA: ICD-10-CM

## 2025-01-27 DIAGNOSIS — N40.0 BENIGN PROSTATIC HYPERPLASIA, UNSPECIFIED WHETHER LOWER URINARY TRACT SYMPTOMS PRESENT: ICD-10-CM

## 2025-01-27 DIAGNOSIS — I10 BENIGN ESSENTIAL HYPERTENSION: ICD-10-CM

## 2025-01-27 DIAGNOSIS — R63.4 WEIGHT LOSS: ICD-10-CM

## 2025-01-27 DIAGNOSIS — K44.9 LARGE HIATAL HERNIA: ICD-10-CM

## 2025-01-27 DIAGNOSIS — N18.31 STAGE 3A CHRONIC KIDNEY DISEASE (CMS/HCC): ICD-10-CM

## 2025-01-27 DIAGNOSIS — R11.0 NAUSEA: ICD-10-CM

## 2025-01-27 DIAGNOSIS — I10 BENIGN ESSENTIAL HYPERTENSION: Primary | ICD-10-CM

## 2025-01-27 LAB
ALBUMIN SERPL-MCNC: 4.4 G/DL (ref 3.5–5.7)
ALP SERPL-CCNC: 63 IU/L (ref 34–125)
ALT SERPL-CCNC: 16 IU/L (ref 7–52)
ANION GAP SERPL CALC-SCNC: 7 MEQ/L (ref 3–15)
AST SERPL-CCNC: 22 IU/L (ref 13–39)
BILIRUB SERPL-MCNC: 1 MG/DL (ref 0.3–1.2)
BUN SERPL-MCNC: 41 MG/DL (ref 7–25)
CALCIUM SERPL-MCNC: 9.6 MG/DL (ref 8.6–10.3)
CHLORIDE SERPL-SCNC: 105 MEQ/L (ref 98–107)
CHOLEST SERPL-MCNC: 148 MG/DL
CO2 SERPL-SCNC: 27 MEQ/L (ref 21–31)
CREAT SERPL-MCNC: 1.9 MG/DL (ref 0.7–1.3)
EGFRCR SERPLBLD CKD-EPI 2021: 33.3 ML/MIN/1.73M*2
GLUCOSE SERPL-MCNC: 95 MG/DL (ref 70–99)
HDLC SERPL-MCNC: 69 MG/DL
HDLC SERPL: 2.1 {RATIO}
LDLC SERPL CALC-MCNC: 70 MG/DL
MAGNESIUM SERPL-MCNC: 2.2 MG/DL (ref 1.8–2.5)
NONHDLC SERPL-MCNC: 79 MG/DL
POTASSIUM SERPL-SCNC: 5.4 MEQ/L (ref 3.5–5.1)
PROT SERPL-MCNC: 7 G/DL (ref 6–8.2)
SODIUM SERPL-SCNC: 139 MEQ/L (ref 136–145)
TRIGL SERPL-MCNC: 44 MG/DL

## 2025-01-27 PROCEDURE — 83735 ASSAY OF MAGNESIUM: CPT

## 2025-01-27 PROCEDURE — 80053 COMPREHEN METABOLIC PANEL: CPT

## 2025-01-27 PROCEDURE — 36415 COLL VENOUS BLD VENIPUNCTURE: CPT

## 2025-01-27 PROCEDURE — 99214 OFFICE O/P EST MOD 30 MIN: CPT | Performed by: INTERNAL MEDICINE

## 2025-01-27 PROCEDURE — 80061 LIPID PANEL: CPT

## 2025-01-27 ASSESSMENT — ENCOUNTER SYMPTOMS
ANAL BLEEDING: 0
POLYDIPSIA: 0
FEVER: 0
HYPERACTIVE: 0
SPEECH DIFFICULTY: 0
JOINT SWELLING: 0
FACIAL SWELLING: 0
COUGH: 0
CHILLS: 0
PALPITATIONS: 0
RECTAL PAIN: 0
FACIAL ASYMMETRY: 0
COLOR CHANGE: 0
SEIZURES: 0
DIFFICULTY URINATING: 0
APNEA: 0
AGITATION: 0
EYE DISCHARGE: 0
CHEST TIGHTNESS: 0
HEMATURIA: 0
ABDOMINAL PAIN: 0
ADENOPATHY: 0
SHORTNESS OF BREATH: 0

## 2025-01-27 NOTE — PROGRESS NOTES
Alex Elliott MD  Geriatric Medicine                                                    GERIATRIC MEDICINE    Subjective      Patient ID: Efrain Cardona Jr. is a 89 y.o. male.    Disease Management Visit    The patient is here for an acute problem and for disease Management for evaluation and management of the following chronic medical problems.    This includes: symptom review, medication regimen review, laboratory monitoring, and review of diagnostic testing.  _____________________________________________________________________    1/27/25 interval disease management update:    --Hypertension:   Blood pressure well controlled on current regimen. Recorded at 118/60 in the office today.   Home blood pressures trending 135-140/80 5 in the morning.  In the afternoon systolic BPs run averaging 115-120.  He did increase his amlodipine to 7.5 mg following last visit.  Patient reports starting on hydrochlorothiazide at 6.25 mg daily.  He takes a half of a 12.5 mg tablet.  He restarted this due to venous insufficiency came on about 4 months ago.    --Hypercholesterolemia:   This has been well controlled on 3/12/24 Total Cholesterol was 147, HDL 67, LDL 71. This appears to be a primary prevention strategy but he has done extremely well with it so despite his age of 88 he is opted to continue current treatment.    --Chronic kidney disease:   Creatinine has been stable most recently on 9/18/2024 creatinine was 1.7 despite going back on low-dose hydrochlorothiazide.    --Osteoporosis:   Patient had red been recommended to start on Prolia by his rheumatologist Dr. Booker but patient states openly he is noncompliant with this recommendation and does not desire to start that at this time.  Had DEXA scan and condition is roughly stable. Levels are in Osteopenia range. He follows with rheumatology, Dr. Polo Booker, and was told to start either Fosamax or Prolia. He did not start these. He notes Fosamax has  contraindications with his medications for CKD. Pt notes a previous fall where his hip had screws implanted. No other falls noted. 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 He has lost 4 inches of height due to scoliosis.  We again discussed x-ray findings and follow-up DEXA scan results which was performed on 7/5/2023.T-scores at lumbar spine was +0.4 and right hip was -2.1.  FRAX score results were 12.0% for major osteoporotic fracture at 10 years and 5.2% for hip fracture at 10 years.    --GERD:   Symptoms stable.  Patient only takes Prilosec PRN. He has not needed to take it recently.  --Edema:     Chronic low back pain  -- Patient reports scoliosis but his pain has not progressed being 2/10 at its worst usually.  It was discussed should he have progression of his symptoms that we may consider MRI imaging looking for nerve root compression.    --Comprehensive medication reconciliation was performed at today's visit.    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  We had previously discussed the small risk this poses.  _________________________  _________________________      5/15/24 interval disease management update:  --Hypertension: Blood pressure well controlled on current regimen. Recorded at 124/70 in the office today. Pt has lost weight since last visit.   Patient reports starting on hydrochlorothiazide at 6.25 mg daily.  He breaks up the pills to quarter pills on his own.  He restarted this due to venous insufficiency came on about 4 months ago.  He also reduced amlodipine from 7.5 mg daily to 5 mg daily.  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  --Hypercholesterolemia: This has been  well controlled on 3/12/24 Total Cholesterol was 147, HDL 67, LDL 71. This appears to be a primary prevention strategy but he has done extremely well with it so despite his age of 88 he is opted to continue current treatment.  --Chronic kidney disease: Creatinine has been stable most recently on 3/12/24 at 1.6 despite going back on low-dose hydrochlorothiazide.  --Osteoporosis: Had DEXA scan and condition is roughly stable. Levels are in Osteopenia range. He follows with rheumatology, Dr. Polo Booker, and was told to start either Fosamax or Prolia. He did not start these. He notes Fosamax has contraindications with his medications for CKD. Pt notes a previous fall where his hip had screws implanted. No other falls noted. 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 He has lost 4 inches of height due to scoliosis.  We again discussed x-ray findings and follow-up DEXA scan results which was performed on 7/5/2023.T-scores at lumbar spine was +0.4 and right hip was -2.1.  FRAX score results were 12.0% for major osteoporotic fracture at 10 years and 5.2% for hip fracture at 10 years.  --GERD: Patient only takes Prilosec PRN. He has not needed to take it recently.  --Edema: Pt reports having bad edema in January leading to him restarting hydrochlorothizide. Pt started on hydrochlorothiazide 6.25 mg once a day which has helped solve his edema. Pt notes that he check his BP daily and noticed that it occasionally runs low Pt reports systoligc at  and diastolics around 50. Pt reports taking amlodipine 5 mg and stopped amlodipine 2.5 mg in the morning after he started hydrochlorothizide.   --RSV, COVID, and Shingrix vaccination counseling was discussed. Pt will get all three vaccines at his local pharmacy.   --Comprehensive medication reconciliation was performed at today's visit.    1/15/24 ED follow-up and interval disease management update:  --ED visit: Pt was in the ED on 1/1/24  "for LLQ abdominal pain. ED noted \"Colonic diverticulosis without evidence of acute diverticulitis, Increase in large hiatal hernia containing numerous upper abdominal contents as discussed below. Stable fusiform aortobiiliac ectasia. Patient well-appearing here, made aware of results of CT.  He is aware of his large hiatal hernia. I did encourage him to follow-up with surgery for this.  The ED physician provided a prescription for Flagyl to add to his Cipro which I will send to his pharmacy.   Patient's pain resolved while he was in the emergency room.  It was more acute in onset pain that brought him to the ER in order to have an abscess ruled out.  He is convinced that this may have been acute diverticulitis that had been largely treated with Cipro and the acute pain was due to something not identified.  He did finish the Cipro and Flagyl combination.  CT scan at that time showed that his very large hiatal hernia is larger than it had been seen and resulted in the herniation of numerous upper abdominal organs into the thoracic cavity.  This included the entire transverse colon and the entire stomach.  The proximal duodenum and a portion of the proximal pancreatic body was also herniated.  Patient has felt well since that time except that on Saturday 1/13 and Sunday, 1/14/2024 he noted dyspnea on exertion.  This had resolved today and was able to exercise today without any exercise intolerance.  He questions whether he had acute worsening of his hiatal hernia with above herniation of organs into the thoracic cavity on the left contributing or causing his dyspnea on exertion.  Of note he has not had GERD or reflux symptoms or other symptoms of GERD.  He has not had recent GI follow-up to further evaluate this possibility.  Of note the CAT scan also showed extensive coronary artery calcification but he has had no symptoms suggestive of ischemia  --Hypertension:   Blood pressure well controlled on current regimen.  He " has mild edema from venous insufficiency due to amlodipine but this is stable with right ankle edema which is trace.  --Hypercholesterolemia:   This has been well controlled on 9/14/2023 his LDL was 78.  This appears to be a primary prevention strategy but he has done extremely well with it so despite his age of 88 he is opted to continue current treatment.  --Chronic kidney disease:   Creatinine has been stable most recently on 7/5/2023 at 1.6  --Osteoporosis:   Had DEXA scan and condition is roughly stable. Levels are in Osteopenia range. He follows with rheumatology, Dr. Polo Booker, and was told to start either Fosamax or Prolia. He did not start these. He notes Fosamax has contraindications with his medications for CKD. Pt notes a previous fall where his hip had screws implanted. No other falls noted. 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 He has lost 4 inches of height due to scoliosis.  We again discussed x-ray findings and follow-up DEXA scan results which was performed on 7/5/2023.T-scores at lumbar spine was +0.4 and right hip was -2.1.  FRAX score results were 12.0% for major osteoporotic fracture at 10 years and 5.2% for hip fracture at 10 years.  --GERD:   Patient only takes Prilosec PRN. He has not needed to take it recently.  --Comprehensive medication reconciliation was performed at today's visit.    09/18/23 interval disease management update:  --Hypertension: Blood pressure well controlled on current regimen.  He has mild edema from venous insufficiency due to amlodipine but this is stable with right ankle edema which is trace.  --Hypercholesterolemia: This has been well controlled on 9/14/2023 his LDL was 78.  This appears to be a primary prevention strategy but he has done extremely well with it so despite his age of 88 he is opted to continue current treatment.  --Chronic kidney disease: Creatinine has been stable most recently on 7/5/2023 at  1.6  --Osteoporosis: Had DEXA scan and condition is roughly stable. Levels are in Osteopenia range. He follows with rheumatology, Dr. Polo Booker, and was told to start either Fosamax or Prolia. He did not start these. He notes Fosamax has contraindications with his medications for CKD. Pt notes a previous fall where his hip had screws implanted. No other falls noted. 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 He has lost 4 inches of height due to scoliosis.   We again discussed x-ray findings and follow-up DEXA scan results which was performed on 7/5/2023.  T-scores at lumbar spine was +0.4 and right hip was -2.1.  FRAX score results were 12.0% for major osteoporotic fracture at 10 years and 5.2% for hip fracture at 10 years.  --GERD: Patient only takes Prilosec PRN. He has not needed to take it recently.  --Influenza Vaccination counseling was discussed. Pt will get vaccine at a later date. COVID 19 vaccine consultation was discussed. Should be expected within next week.   --Comprehensive medication reconciliation was performed at today's visit.    05/15/23 interval disease management update:  --Hypertension: Blood pressure well controlled on current regimen.  He has mild edema from venous insufficiency due to amlodipine but this is stable with right ankle edema which is trace.  --Hypercholesterolemia: This has been well controlled on 9/14/2023 his LDL was 78.  This appears to be a primary prevention strategy but he has done extremely well with it so despite his age of 88 he is opted to continue current treatment.  --Chronic kidney disease: Creatinine has been stable most recently on 2/22/2023 1.5.  --Osteoporosis: He has lost about 4 inches of height. DEXA scan 4 years ago showed mild early osteoporosis. He followed with rheumatology, Dr. Polo Booker, and was told to start either Fosamax or Prolia. He did not start these. He notes Fosamax has contraindications with his  medications, but he does not know which medications.  --GERD: Patient only takes Prilosec PRN. He has not needed to take it recently.  --Comprehensive medication reconciliation was performed at today's visit.    2/27/2023 interval disease management update:  -- Hypertension: Patient's blood pressure stable on current regimen.  Continuing on current treatment.  He reports his home blood pressure monitor occasionally goes up to 144 but averaging in the 120s to 130s.  -- Chronic kidney disease: Creatinine stable on 2/22/2023 creatinine stable at 1.5.  -- Hypercholesterolemia: Lipid numbers excellent in September.  We will check them again following next visit.  -- BPH: Stable on Proscar.  Continue current treatment.  -- Prevention: Patient asked whether he is due for a pneumonia booster vaccine.  He had a second booster Pneumovax 23 in 2011 and had Prevnar 13 in 2015 so we discussed that he has done pneumonia vaccination for life.  -- Comprehensive medication reconciliation was performed at today's visit.      -----------------------------  Previous visits and chronic problem review:    Hypertension  --- Update 11/9/2020: Blood pressure is now well controlled his hydrochlorothiazide was stopped due to rising creatinine up to 2.6.  Creatinine is now down to 1.3.  --- Update 7/8/2020: Blood pressure is low as above.  Blood pressure medications put on hold.  --- Update 3/11/2020: Blood pressure stable.  His cardiologist dropped his Toprol.  Hydrochlorothiazide was increased to daily dosing.  This simplified his regimen.  He is well controlled at home he is running 110-130/80  --- Update 11/6/2019: Patient is concerned because he has hypertension on morning blood pressure check in the range of 160/90 but by afternoon it is down to 120/80.  At today's visit he is 136/80.  His cardiologist had previously addressed this concern and was not concerned about it.  --- Update 7/10/2019: Stable on current regimen.  Note he is  successfully weaned off Norvasc with addition of hydrochlorothiazide and his blood pressure is tolerating hydrochlorothiazide dose reduction to 12.5 g daily  --- Update 3/4/18: Blood pressure stable with medication change with reduction of Norvasc to 5 mg in addition of hydrochlorothiazide 25 mg Monday Wednesday Friday.  --- Update 6/25/18: Stable.  ---update 2/26/18: Stable; still occasional edema on hot days from amlodipine but he is tolerating it.  ---Update 10/30/17: home BP stable.  --- Update 7/5/17: Home BP tracking stable--however he reports some variability  --- Previous visits: BP is stable on dose reduction of Norvasc and a small dose of Toprol --summertime edema from Norvasc.  --- Previous visit:Patient notes that his home blood pressure monitoring is all from 10-20 points higher than what we check in the office.  He reports running in the systolic range of 125-140 at home.  --- Patient asked about switching from Vasotec to lisinopril do to major cost advantages.  He also notes summertime edema from Norvasc.  Medications       ((( Toprol 12.5 mg daily )))   -by cardiologist 1/31/2020  Norvasc 5 mg daily   Lisinopril 40 mg daily      ((( Hydrochlorothiazide 12.5 mg   Daily )))       (((   ASA   )))  --stopped due to study from [place] recommending against ASA for primary prevention    Anemia  -He has recurrent iron deficiency anemia.  --- Update 11/9/2020: Most recent hemoglobin was excellent at 12.0 on 7/17/2020  --- Update 7/8/2020: Most recent hemoglobin was normal on 1/31/2020 at 12.5.  --- 3/11/2020: This is resolved with most recent hemoglobin on 1/31/2020 of 12.5  --- Update 11/6/2019: Hemoglobin was normal at 12.7 on most recent check which was 9/18/2019.  --- Date 7/10/2019: Hemoglobin on 4/18/2019 was normal at 13.5.  No evidence of recurrent GI bleeding.  --- Update 3/4/19: Hemoglobin was normal on last check 11/13/18 at 14.7  --- Update 6/25/18: Stable.  Recent hemoglobin has been normal most  recently 3/20/18 was 14.5.  This is normalized since he stopped aspirin.  ---update 2/26/18: EGD done by GI this summer was negative.He is due for follow-up CBC next month.  Already ordered.  --- Update 7/5/17: Patient has had no signs of GI bleeding.  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  ---His recent hemoglobin was 9/19/17 Hgb 14.3  -off ASA  2/15/17 hemoglobin was 13.1  8/18/16 was 14.1  5/25/16 was 12.8  12/30/15 was 14.1  6/30/15 was 13.9  ---Previous visit:  He has since stopped his iron for his capsule endoscopy study which was done approximately 3 weeks ago to evaluate colonic and gastric AVMs as well as the small bowel for source of blood loss.----Patient reports that his capsule endoscopy study was negative. GI recommended stopping aspirin.  ---His gastroenterologist feels this is most likely do to either gastric or colonic AVMs  Last EGD and colonoscopy were done in July 2014.    Hypercholesterolemia  -Goal of treatment is LDL around 100  Primary prevention strategy  Last lipids  --- Update 3/11/2020: Most recent lipid profile on 7/17/2020: Total cholesterol 131 HDL 55 LDL 67  1/31/2020 was excellent: Total cholesterol 143 HDL 64 LDL 72  4/18/2019: Total cholesterol 172 HDL 52   9/19/17:    HDL 71  LDL 92  2/15/17: Total cholesterol 151 HDL 69 LDL 77  5/25/16: Total cholesterol 168 HDL 72 LDL 81  8/26/15: Total cholesterol 198 HDL 71   11/24/14:    HDL 79  LDL 84  Medications  Zocor 40 mg daily    GERD  --- Update 11/9/2020: Stable now on as needed regimen.  --- Update 7/8/2020: Stable  --- Update 11/6/2019: Patient reports that he takes his PPI about 5 days/week.  In light of his osteoporosis and hypomagnesemia we discussed possibly switching to an H2 blocker such as ranitidine.  We discussed recommended trial of ranitidine 150 mg daily at bedtime.  He may check with his GI specialist Dr. Rollins about this recommendation.  As above he  has 2 of the complications that PPI cause including accelerated osteoporosis and hypomagnesemia.  Medications  Prilosec 20 mg daily as needed    BPH  Medication  Proscar 5 mg daily    Gout  --- Update 11/9/2020: Allopurinol was stopped as his hydrochlorothiazide which was the likely trigger of his gout attacks was also stopped.  --- Update 7/8/2020: Yesterday on 7/7/2020 his nephrologist recommended reducing allopurinol to 100 mg daily.  He ordered a uric acid follow-up blood test.  --- Date 3/11/20: Uric acid remains less than 6 on hydrochlorothiazide.  --- Update 11/6/2019: Stable without recent attack  --- Update 7/10/2019: Patient is tolerating hydrochlorothiazide without any episodes of gout.  --- Update 2/26/18: No recent attacks of gout  Last uric acid 9/19/17 was 4.6  6/30/15 was 4.1  Medications       ((( Allopurinol 100 mg daily                      -reduction 7/7/2020  )))  (Prednisone 20-30 mg when necessary with a 2-3 day taper for acute attacks--- patient initiates treatment himself with this regimen)    Chronic kidney disease  ---s/p Left Nephrectomy 2006  -Transitional Cell Ca  Creatinine ranges 1.4-1.8  --- Update 11/9/2020: Most recent creatinine off hydrochlorothiazide is excellent at 1.3 on 7/17/2020.  --- Update 7/8/2020:  Most recent creatinine on 1/2/2020 creatinine had risen to 2.6.  Seen by nephrology 7/7/2020 and felt to be volume contraction due to dehydration from diuretic.  All antihypertensive meds and diuretic were discontinued yesterday.  Lab testing was ordered by his nephrologist for 2 weeks from now.  9/18/2019 was stable at 1.7  5/16/2019 was stable at 1.8  11/13/18 was 1.3  -this is the best it has been in years.  3/20/18 was 1.5  6/29/17 was 1.6  2/15/17 was 1.6  8/18/16 was 1.6  5/25/16 was 1.7  12/30/15 was 1.4  4/1/15  was 1.6    History of colon polyp    History of intermittent diverticulitis  -Treated with Augmentin or goes to Cipro/Flagyl if persistent symptoms    Low back  pain  -Chronic degenerative scoliosis    Hypomagnesemia  --Patient takes an OTC magnesium supplement.  -------------------    Acute problem August 2016 -- diplopia  Progress note:  Over the last 6 months she has had at least 3 episodes of very brief diplopia lasting 1-2 minutes.  He reports if he shuts either eye the sensation is eliminated. With both eyes open it occurs.  This resolves spontaneously within 2 minutes.  Patient denies any other associated neurological symptoms including: He has not had weakness, fatigue, dizziness, speech trouble, etc....  Follow-up: Patient saw his ophthalmologist who examined him and ordered an MRI of the brain which was unremarkable and carotid Doppler study which was normal. He suspected that he had transient extraocular muscle weakness for unclear reasons.  He has not had a recurrence of his symptoms since July 2016.    Hosp ER Eval  9/22/15:  non-spec ABD Pain w normal CT ABD.    -------------------    Preventive health care and Screening summary:   UPDATE  3/6/17  Colonoscopy  -August 2014  Flu vaccine  2014; 2015 HD  -pharm 12/16/15 for HD; high-dose 12/5/16  Pneumovax  Booster 2011; Prevnar 13 given 2/23/15  dT Booster  Tdap March 2012  Zostavax  --patient did not get this from the pharmacy as ordered and now he wants to await the second generation Zostavax from a different  which may have better efficacy--possibly coming later in 2017  PSA  -2.02  8/26/15--note patient is now over the age of 81 so that screening PSA no longer recommended        The following have been reviewed and updated as appropriate in this visit:   Allergies  Meds  Problems         Past Medical History:   Diagnosis Date    Abnormal EKG     Arrhythmia     Chronic kidney disease     Disease of thyroid gland     1991    Diverticulitis of colon     Diverticulosis    GI (gastrointestinal bleed)     X3   last 2015    Heart murmur     Hyperlipidemia     Hypertension     Infectious viral  hepatitis     Lipid disorder     Prostate enlargement     Renal failure        Past Surgical History   Procedure Laterality Date    Hernia repair      2001    Hip fracture surgery Left 2020    Kidney surgery Left     kidney cancer    OPEN REDUCTION INTERNAL FIXATION HIP/FEMUR PINNING/CANNULATED SCREWS Left 2020    Performed by Efrain Waggoner [facility] at [facility]       Family History   Problem Relation Name Age of Onset    Lung cancer Biological Mother      Leukemia Biological Father         Social History     Socioeconomic History    Marital status:      Spouse name: Not on file    Number of children: 4    Years of education: Not on file    Highest education level: Not on file   Occupational History    Not on file   Tobacco Use    Smoking status: Former     Current packs/day: 0.00     Types: Cigarettes     Quit date:      Years since quittin.1    Smokeless tobacco: Never    Tobacco comments:     PIPE    Vaping Use    Vaping status: Never Used   Substance and Sexual Activity    Alcohol use: Yes     Comment: [place]/daily    Drug use: No    Sexual activity: Not Currently   Other Topics Concern    Not on file   Social History Narrative    Not on file     Social Drivers of Health     Financial Resource Strain: Not on file   Food Insecurity: Not on file   Transportation Needs: Not on file   Physical Activity: Not on file   Stress: Not on file   Social Connections: Not on file   Intimate Partner Violence: Not on file   Housing Stability: Not on file       Allergies   Allergen Reactions    Griseofulvin      Other reaction(s): UNKNOWN  fulvicin     Tetracycline Rash and Other (see comments)     minocin        Current Outpatient Medications   Medication Sig Dispense Refill    amLODIPine (NORVASC) 5 mg tablet Take by mouth daily. Pt takes 2.5 mg or 5 mg based on BP readings      diphenoxylate-atropine (LomotiL) 2.5-0.025 mg per tablet Take 1 tablet by mouth daily as needed for diarrhea. 10  tablet 1    finasteride (PROSCAR) 5 mg tablet Take 5 mg by mouth daily.      hydrochlorothiazide (HYDRODIURIL) 25 mg tablet Take 6.25 mg by mouth daily.      lisinopriL (PRINIVIL) 40 mg tablet Take 40 mg by mouth daily.      predniSONE (DELTASONE) 10 mg tablet take 1 tablet by mouth twice a day as directed (Patient taking differently: as needed.)      sildenafiL, pulm.hypertension, 20 mg tablet take 5 tablets by mouth as directed if needed      simvastatin (ZOCOR) 40 mg tablet Take 1 tablet by mouth daily.  0     No current facility-administered medications for this visit.       Review of Systems   Constitutional:  Negative for chills and fever.   HENT:  Positive for hearing loss. Negative for facial swelling.    Eyes:  Negative for discharge.   Respiratory:  Negative for apnea, cough, chest tightness and shortness of breath.    Cardiovascular:  Positive for leg swelling. Negative for chest pain and palpitations.        Palpitations and premature beats have resolved completely it appears.   Gastrointestinal:  Negative for abdominal pain, anal bleeding and rectal pain.   Endocrine: Negative for heat intolerance and polydipsia.   Genitourinary:  Negative for decreased urine volume, difficulty urinating and hematuria.   Musculoskeletal:  Negative for joint swelling.   Skin:  Negative for color change.   Allergic/Immunologic: Negative for environmental allergies and food allergies.   Neurological:  Negative for seizures, syncope, facial asymmetry and speech difficulty.   Hematological:  Negative for adenopathy.   Psychiatric/Behavioral:  Negative for agitation. The patient is not hyperactive.        Objective     Vitals:    01/27/25 1149   BP: 118/60   Pulse: 68   Resp: 16   SpO2: 97%         Physical Exam  Constitutional:       Appearance: Normal appearance. He is well-developed.   HENT:      Head: Normocephalic.   Cardiovascular:      Rate and Rhythm: Normal rate and regular rhythm.      Heart sounds: Normal heart  "sounds.   Pulmonary:      Effort: Pulmonary effort is normal. No respiratory distress.      Breath sounds: Normal breath sounds. No wheezing or rales.   Abdominal:      General: Bowel sounds are normal.      Palpations: Abdomen is soft.      Tenderness: There is no abdominal tenderness.   Neurological:      Mental Status: He is alert.         Lab Results   Component Value Date    WBC 5.85 09/18/2024    HGB 14.4 09/18/2024    HCT 42.9 09/18/2024    .0 (H) 09/18/2024     (L) 09/18/2024         Chemistry        Component Value Date/Time     09/18/2024 1235    K 5.2 (H) 09/18/2024 1235     09/18/2024 1235    CO2 26 09/18/2024 1235    BUN 41 (H) 09/18/2024 1235    CREATININE 1.7 (H) 09/18/2024 1235        Component Value Date/Time    CALCIUM 9.4 09/18/2024 1235    ALKPHOS 69 09/18/2024 1235    AST 21 09/18/2024 1235    ALT 17 09/18/2024 1235    BILITOT 0.6 09/18/2024 1235            Lab Results   Component Value Date    CHOL 147 03/12/2024    CHOL 146 09/14/2022    CHOL 158 11/17/2021     Lab Results   Component Value Date    HDL 67 03/12/2024    HDL 62 09/14/2022    HDL 62 11/17/2021     Lab Results   Component Value Date    LDLCALC 71 03/12/2024    LDLCALC 78 09/14/2022    LDLCALC 87 11/17/2021     Lab Results   Component Value Date    TRIG 44 03/12/2024    TRIG 30 09/14/2022    TRIG 47 11/17/2021     No results found for: \"CHOLHDL\"    Lab Results   Component Value Date    TSH 1.32 05/15/2024       Lab Results   Component Value Date    HGBA1C 4.8 08/26/2015       No results found for: \"HAV\", \"HEPAIGM\", \"HEPBIGM\", \"HEPBCAB\", \"HBEAG\", \"HEPCAB\"    No results found for: \"MICROALBUR\", \"[id number]\"    Assessment/Plan   1. Benign essential hypertension    2. Nausea    3. Large hiatal hernia    4. Gastroesophageal reflux disease without esophagitis    5. Stage 3a chronic kidney disease (CMS/HCC)    6. Benign prostatic hyperplasia, unspecified whether lower urinary tract symptoms present    7. Age " related osteoporosis, unspecified pathological fracture presence    8. Mixed hyperlipidemia    9. Weight loss          Problem List Items Addressed This Visit          Respiratory    Large hiatal hernia       Circulatory    Benign essential hypertension - Primary (Chronic)    Overview     Overview:          Relevant Orders    Comprehensive metabolic panel    Magnesium       Digestive    Esophageal reflux    Overview     Overview:          Nausea       Genitourinary    Chronic kidney disease, stage III (moderate) (CMS/[facility])    Overview     Overview:          Benign prostatic hyperplasia       Musculoskeletal    Age related osteoporosis       Endocrine/Metabolic    Weight loss       Other    Mixed hyperlipidemia (Chronic)    Current Assessment & Plan       __________________________________________  CONSOLIDATED ASSESSMENT AND PLAN:  -the below A/P is the consolidated plan for the above conditions:  _____     Hypertension:  -- Blood pressure well-controlled on current regimen.  Continue current treatment.    Hypercholesterolemia:   -- Patient due for fasting lipid profile which we will get today.  Continue current treatment which is a primary prevention strategy.    Chronic kidney disease:  -- Creatinine stable most recently at 1.7.  Check CMP today.    Osteoporosis:   -- Patient declines Prolia or Fosamax treatment at this time.    GERD:   -- Stable on current treatment.  Continue current treatment.    Edema:   -- Stable on low-dose hydrochlorothiazide.        35  minutes on this date of service was spent by Dr. Elliott  performing the following activities: Previsit medical record review, diagnostic testing review, medical consult report review, obtaining history from patient, performing examination, entering orders, comprehensive medication reconciliation, counseling regarding evaluation and management of the above conditions as detailed above, and documentation of this visit.            Relevant Orders    Lipid  panel           Return in about 4 months (around 5/27/2025).    Orders Placed This Encounter   Procedures    Comprehensive metabolic panel     Standing Status:   Future     Number of Occurrences:   1     Standing Expiration Date:   1/27/2026     Order Specific Question:   Has the patient fasted?     Answer:   Yes     Order Specific Question:   Release to patient     Answer:   Immediate [1]    Magnesium     Standing Status:   Future     Number of Occurrences:   1     Standing Expiration Date:   1/27/2026     Order Specific Question:   Has the patient fasted?     Answer:   Yes     Order Specific Question:   Release to patient     Answer:   Immediate [1]    Lipid panel     Standing Status:   Future     Number of Occurrences:   1     Standing Expiration Date:   1/27/2026     Order Specific Question:   Has the patient fasted?     Answer:   Yes     Order Specific Question:   Release to patient     Answer:   Immediate [1]         Alex Elliott MD  1/27/2025

## 2025-01-27 NOTE — ASSESSMENT & PLAN NOTE
__________________________________________  CONSOLIDATED ASSESSMENT AND PLAN:  -the below A/P is the consolidated plan for the above conditions:  _____     Hypertension:  -- Blood pressure well-controlled on current regimen.  Continue current treatment.    Hypercholesterolemia:   -- Patient due for fasting lipid profile which we will get today.  Continue current treatment which is a primary prevention strategy.    Chronic kidney disease:  -- Creatinine stable most recently at 1.7.  Check CMP today.    Osteoporosis:   -- Patient declines Prolia or Fosamax treatment at this time.    GERD:   -- Stable on current treatment.  Continue current treatment.    Edema:   -- Stable on low-dose hydrochlorothiazide.        35  minutes on this date of service was spent by Dr. Elliott  performing the following activities: Previsit medical record review, diagnostic testing review, medical consult report review, obtaining history from patient, performing examination, entering orders, comprehensive medication reconciliation, counseling regarding evaluation and management of the above conditions as detailed above, and documentation of this visit.

## 2025-01-29 ENCOUNTER — TELEPHONE (OUTPATIENT)
Dept: PRIMARY CARE | Facility: CLINIC | Age: 89
End: 2025-01-29
Payer: MEDICARE

## 2025-01-29 NOTE — TELEPHONE ENCOUNTER
Patient is informed and wants to know if he should have a repeat CMP in 3 months to monitor his kidney function?

## 2025-01-29 NOTE — TELEPHONE ENCOUNTER
----- Message from Alex Elliott sent at 1/29/2025  3:38 PM EST -----  Please call patient that lab results show creatinine reasonably stable at 1.9.  Potassium a little high at 5.4.  Please instruct him to follow a lower potassium diet by avoiding bananas, tomato juice, tomato sauces to the extent possible.  Lipid numbers are excellent.  Thanks

## 2025-05-16 ENCOUNTER — OFFICE VISIT (OUTPATIENT)
Dept: CARDIOLOGY | Facility: CLINIC | Age: 89
End: 2025-05-16
Payer: MEDICARE

## 2025-05-16 VITALS
DIASTOLIC BLOOD PRESSURE: 66 MMHG | HEIGHT: 63 IN | OXYGEN SATURATION: 95 % | BODY MASS INDEX: 23.74 KG/M2 | WEIGHT: 134 LBS | HEART RATE: 61 BPM | SYSTOLIC BLOOD PRESSURE: 106 MMHG

## 2025-05-16 DIAGNOSIS — N18.31 STAGE 3A CHRONIC KIDNEY DISEASE (CMS/HCC): Chronic | ICD-10-CM

## 2025-05-16 DIAGNOSIS — I49.49 PREMATURE BEATS: Chronic | ICD-10-CM

## 2025-05-16 DIAGNOSIS — I35.1 NONRHEUMATIC AORTIC VALVE INSUFFICIENCY: Primary | Chronic | ICD-10-CM

## 2025-05-16 DIAGNOSIS — I10 BENIGN ESSENTIAL HYPERTENSION: Chronic | ICD-10-CM

## 2025-05-16 LAB
ATRIAL RATE: 61
P AXIS: 32
PR INTERVAL: 168
QRS DURATION: 76
QT INTERVAL: 396
QTC CALCULATION(BAZETT): 398
R AXIS: 23
T WAVE AXIS: 51
VENTRICULAR RATE: 61

## 2025-05-16 PROCEDURE — 99213 OFFICE O/P EST LOW 20 MIN: CPT | Performed by: INTERNAL MEDICINE

## 2025-05-16 PROCEDURE — 93000 ELECTROCARDIOGRAM COMPLETE: CPT | Performed by: INTERNAL MEDICINE

## 2025-06-04 ENCOUNTER — OFFICE VISIT (OUTPATIENT)
Dept: PRIMARY CARE | Facility: CLINIC | Age: 89
End: 2025-06-04
Payer: MEDICARE

## 2025-06-04 VITALS
HEART RATE: 76 BPM | WEIGHT: 135.8 LBS | HEIGHT: 63 IN | SYSTOLIC BLOOD PRESSURE: 130 MMHG | BODY MASS INDEX: 24.06 KG/M2 | OXYGEN SATURATION: 97 % | RESPIRATION RATE: 16 BRPM | DIASTOLIC BLOOD PRESSURE: 78 MMHG

## 2025-06-04 DIAGNOSIS — N18.31 STAGE 3A CHRONIC KIDNEY DISEASE (CMS/HCC): Chronic | ICD-10-CM

## 2025-06-04 DIAGNOSIS — I10 BENIGN ESSENTIAL HYPERTENSION: Primary | Chronic | ICD-10-CM

## 2025-06-04 DIAGNOSIS — K21.9 GASTROESOPHAGEAL REFLUX DISEASE WITHOUT ESOPHAGITIS: ICD-10-CM

## 2025-06-04 DIAGNOSIS — E78.2 MIXED HYPERLIPIDEMIA: Chronic | ICD-10-CM

## 2025-06-04 PROCEDURE — 99214 OFFICE O/P EST MOD 30 MIN: CPT | Performed by: INTERNAL MEDICINE

## 2025-06-04 ASSESSMENT — PATIENT HEALTH QUESTIONNAIRE - PHQ9: SUM OF ALL RESPONSES TO PHQ9 QUESTIONS 1 & 2: 0

## 2025-06-04 NOTE — PROGRESS NOTES
Alex Elliott MD  Geriatric Medicine        GERIATRIC MEDICINE     Subjective  Patient ID: Efrain Cardona Jr. is a 89 y.o. male    _________________________________________  Disease Management Visit    The patient is here for Disease Management for evaluation and management of the following chronic medical problems.    This includes: symptom review, medication regimen review, laboratory monitoring, and review of diagnostic testing.    History of present illness:    6/4/25 problems discussed at today's visit:  -- Hypertension: Blood pressure well-controlled on current regimen.  -- CKD: Last creatinine is slightly up from previous baseline at 1.9 in 1/27/2025.  Patient due for recheck.  He is unsure of his hydration status at the time of that previous lab test.  -- Hypercholesterolemia: Stable on current regimen.  Most recently on 1/27/2025: Total cholesterol 148 HDL 69 LDL 70  -- Osteoporosis: Patient declined Prolia treatment.    --Comprehensive medication reconciliation was performed at today's visit.  Medication reconciliation:  Lisinopril 40 mg daily  Norvasc 7.5 mg in the evening   -dose reduction on his own January 2024  Hydroclorothiazide 6.25 mg daily  Simvastatin 40 mg daily  Proscar 5 mg daily  Mag-Ox 500 mg daily 3 days/week  --Note: He takes aspirin 325 mg 3 tablets 1 day/week prior to shooting trap.  We had previously discussed the small risk this poses.     ______________________________________________    Recent visit HPIs:   1/27/25 interval disease management update:     --Hypertension:   Blood pressure well controlled on current regimen. Recorded at 118/60 in the office today.   Home blood pressures trending 135-140/80 5 in the morning.  In the afternoon systolic BPs run averaging 115-120.  He did increase his amlodipine to 7.5 mg following last visit.  Patient reports starting on hydrochlorothiazide at 6.25 mg daily.  He takes a half of a 12.5 mg tablet.  He restarted this due to venous  insufficiency came on about 4 months ago.     --Hypercholesterolemia:   This has been well controlled on 3/12/24 Total Cholesterol was 147, HDL 67, LDL 71. This appears to be a primary prevention strategy but he has done extremely well with it so despite his age of 88 he is opted to continue current treatment.     --Chronic kidney disease:   Creatinine has been stable most recently on 9/18/2024 creatinine was 1.7 despite going back on low-dose hydrochlorothiazide.     --Osteoporosis:   Patient had red been recommended to start on Prolia by his rheumatologist Dr. Booker but patient states openly he is noncompliant with this recommendation and does not desire to start that at this time.  Had DEXA scan and condition is roughly stable. Levels are in Osteopenia range. He follows with rheumatology, Dr. Polo Booker, and was told to start either Fosamax or Prolia. He did not start these. He notes Fosamax has contraindications with his medications for CKD. Pt notes a previous fall where his hip had screws implanted. No other falls noted. 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 He has lost 4 inches of height due to scoliosis.  We again discussed x-ray findings and follow-up DEXA scan results which was performed on 7/5/2023.T-scores at lumbar spine was +0.4 and right hip was -2.1.  FRAX score results were 12.0% for major osteoporotic fracture at 10 years and 5.2% for hip fracture at 10 years.     --GERD:   Symptoms stable.  Patient only takes Prilosec PRN. He has not needed to take it recently.  --Edema:      Chronic low back pain  -- Patient reports scoliosis but his pain has not progressed being 2/10 at its worst usually.  It was discussed should he have progression of his symptoms that we may consider MRI imaging looking for nerve root compression.    5/15/24 interval disease management update:  --Hypertension: Blood pressure well controlled on current regimen. Recorded at 124/70  in the office today. Pt has lost weight since last visit.   Patient reports starting on hydrochlorothiazide at 6.25 mg daily.  He breaks up the pills to quarter pills on his own.  He restarted this due to venous insufficiency came on about 4 months ago.  He also reduced amlodipine from 7.5 mg daily to 5 mg daily.  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  --Hypercholesterolemia: This has been well controlled on 3/12/24 Total Cholesterol was 147, HDL 67, LDL 71. This appears to be a primary prevention strategy but he has done extremely well with it so despite his age of 88 he is opted to continue current treatment.  --Chronic kidney disease: Creatinine has been stable most recently on 3/12/24 at 1.6 despite going back on low-dose hydrochlorothiazide.  --Osteoporosis: Had DEXA scan and condition is roughly stable. Levels are in Osteopenia range. He follows with rheumatology, Dr. Polo Booker, and was told to start either Fosamax or Prolia. He did not start these. He notes Fosamax has contraindications with his medications for CKD. Pt notes a previous fall where his hip had screws implanted. No other falls noted. 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 He has lost 4 inches of height due to scoliosis.  We again discussed x-ray findings and follow-up DEXA scan results which was performed on 7/5/2023.T-scores at lumbar spine was +0.4 and right hip was -2.1.  FRAX score results were 12.0% for major osteoporotic fracture at 10 years and 5.2% for hip fracture at 10 years.  --GERD: Patient only takes Prilosec PRN. He has not needed to take it recently.  --Edema: Pt reports having bad edema in January leading to him restarting hydrochlorothizide. Pt started on hydrochlorothiazide 6.25 mg once a day which has helped solve his edema. Pt notes that he check his BP daily and noticed that it occasionally runs low Pt reports systoligc at  and diastolics around 50. Pt  "reports taking amlodipine 5 mg and stopped amlodipine 2.5 mg in the morning after he started hydrochlorothizide.   --RSV, COVID, and Shingrix vaccination counseling was discussed. Pt will get all three vaccines at his local pharmacy.   --Comprehensive medication reconciliation was performed at today's visit.     1/15/24 ED follow-up and interval disease management update:  --ED visit: Pt was in the ED on 1/1/24 for LLQ abdominal pain. ED noted \"Colonic diverticulosis without evidence of acute diverticulitis, Increase in large hiatal hernia containing numerous upper abdominal contents as discussed below. Stable fusiform aortobiiliac ectasia. Patient well-appearing here, made aware of results of CT.  He is aware of his large hiatal hernia. I did encourage him to follow-up with surgery for this.  The ED physician provided a prescription for Flagyl to add to his Cipro which I will send to his pharmacy.   Patient's pain resolved while he was in the emergency room.  It was more acute in onset pain that brought him to the ER in order to have an abscess ruled out.  He is convinced that this may have been acute diverticulitis that had been largely treated with Cipro and the acute pain was due to something not identified.  He did finish the Cipro and Flagyl combination.  CT scan at that time showed that his very large hiatal hernia is larger than it had been seen and resulted in the herniation of numerous upper abdominal organs into the thoracic cavity.  This included the entire transverse colon and the entire stomach.  The proximal duodenum and a portion of the proximal pancreatic body was also herniated.  Patient has felt well since that time except that on Saturday 1/13 and Sunday, 1/14/2024 he noted dyspnea on exertion.  This had resolved today and was able to exercise today without any exercise intolerance.  He questions whether he had acute worsening of his hiatal hernia with above herniation of organs into the thoracic " cavity on the left contributing or causing his dyspnea on exertion.  Of note he has not had GERD or reflux symptoms or other symptoms of GERD.  He has not had recent GI follow-up to further evaluate this possibility.  Of note the CAT scan also showed extensive coronary artery calcification but he has had no symptoms suggestive of ischemia  --Hypertension:   Blood pressure well controlled on current regimen.  He has mild edema from venous insufficiency due to amlodipine but this is stable with right ankle edema which is trace.  --Hypercholesterolemia:   This has been well controlled on 9/14/2023 his LDL was 78.  This appears to be a primary prevention strategy but he has done extremely well with it so despite his age of 88 he is opted to continue current treatment.  --Chronic kidney disease:   Creatinine has been stable most recently on 7/5/2023 at 1.6  --Osteoporosis:   Had DEXA scan and condition is roughly stable. Levels are in Osteopenia range. He follows with rheumatology, Dr. Polo Booker, and was told to start either Fosamax or Prolia. He did not start these. He notes Fosamax has contraindications with his medications for CKD. Pt notes a previous fall where his hip had screws implanted. No other falls noted. 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 He has lost 4 inches of height due to scoliosis.  We again discussed x-ray findings and follow-up DEXA scan results which was performed on 7/5/2023.T-scores at lumbar spine was +0.4 and right hip was -2.1.  FRAX score results were 12.0% for major osteoporotic fracture at 10 years and 5.2% for hip fracture at 10 years.  --GERD:   Patient only takes Prilosec PRN. He has not needed to take it recently.  --Comprehensive medication reconciliation was performed at today's visit.     09/18/23 interval disease management update:  --Hypertension: Blood pressure well controlled on current regimen.  He has mild edema from venous  insufficiency due to amlodipine but this is stable with right ankle edema which is trace.  --Hypercholesterolemia: This has been well controlled on 9/14/2023 his LDL was 78.  This appears to be a primary prevention strategy but he has done extremely well with it so despite his age of 88 he is opted to continue current treatment.  --Chronic kidney disease: Creatinine has been stable most recently on 7/5/2023 at 1.6  --Osteoporosis: Had DEXA scan and condition is roughly stable. Levels are in Osteopenia range. He follows with rheumatology, Dr. Polo Booker, and was told to start either Fosamax or Prolia. He did not start these. He notes Fosamax has contraindications with his medications for CKD. Pt notes a previous fall where his hip had screws implanted. No other falls noted. 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 He has lost 4 inches of height due to scoliosis.   We again discussed x-ray findings and follow-up DEXA scan results which was performed on 7/5/2023.  T-scores at lumbar spine was +0.4 and right hip was -2.1.  FRAX score results were 12.0% for major osteoporotic fracture at 10 years and 5.2% for hip fracture at 10 years.  --GERD: Patient only takes Prilosec PRN. He has not needed to take it recently.  --Influenza Vaccination counseling was discussed. Pt will get vaccine at a later date. COVID 19 vaccine consultation was discussed. Should be expected within next week.   --Comprehensive medication reconciliation was performed at today's visit.     05/15/23 interval disease management update:  --Hypertension: Blood pressure well controlled on current regimen.  He has mild edema from venous insufficiency due to amlodipine but this is stable with right ankle edema which is trace.  --Hypercholesterolemia: This has been well controlled on 9/14/2023 his LDL was 78.  This appears to be a primary prevention strategy but he has done extremely well with it so despite his age  of 88 he is opted to continue current treatment.  --Chronic kidney disease: Creatinine has been stable most recently on 2/22/2023 1.5.  --Osteoporosis: He has lost about 4 inches of height. DEXA scan 4 years ago showed mild early osteoporosis. He followed with rheumatology, Dr. Polo Booker, and was told to start either Fosamax or Prolia. He did not start these. He notes Fosamax has contraindications with his medications, but he does not know which medications.  --GERD: Patient only takes Prilosec PRN. He has not needed to take it recently.  --Comprehensive medication reconciliation was performed at today's visit.     2/27/2023 interval disease management update:  -- Hypertension: Patient's blood pressure stable on current regimen.  Continuing on current treatment.  He reports his home blood pressure monitor occasionally goes up to 144 but averaging in the 120s to 130s.  -- Chronic kidney disease: Creatinine stable on 2/22/2023 creatinine stable at 1.5.  -- Hypercholesterolemia: Lipid numbers excellent in September.  We will check them again following next visit.  -- BPH: Stable on Proscar.  Continue current treatment.  -- Prevention: Patient asked whether he is due for a pneumonia booster vaccine.  He had a second booster Pneumovax 23 in 2011 and had Prevnar 13 in 2015 so we discussed that he has done pneumonia vaccination for life.  -- Comprehensive medication reconciliation was performed at today's visit.        -----------------------------  Past medical history:    Previous visits and chronic problem review:     Hypertension     Anemia  -He has recurrent iron deficiency anemia.  ---His gastroenterologist feels this is most likely do to either gastric or colonic AVMs  Last EGD and colonoscopy were done in July 2014.     Hypercholesterolemia  -Goal of treatment is LDL around 100  Primary prevention strategy     GERD     BPH     Gout     Chronic kidney disease  ---s/p Left Nephrectomy 2006  -Transitional Cell Ca      History of colon polyp     History of intermittent diverticulitis  -Treated with Augmentin or goes to Cipro/Flagyl if persistent symptoms     Low back pain  -Chronic degenerative scoliosis     Hypomagnesemia  --Patient takes an OTC magnesium supplement.  -------------------     Acute problem August 2016 -- diplopia  Progress note:  Over the last 6 months she has had at least 3 episodes of very brief diplopia lasting 1-2 minutes.  He reports if he shuts either eye the sensation is eliminated. With both eyes open it occurs.  This resolves spontaneously within 2 minutes.  Patient denies any other associated neurological symptoms including: He has not had weakness, fatigue, dizziness, speech trouble, etc....  Follow-up: Patient saw his ophthalmologist who examined him and ordered an MRI of the brain which was unremarkable and carotid Doppler study which was normal. He suspected that he had transient extraocular muscle weakness for unclear reasons.  He has not had a recurrence of his symptoms since July 2016.     Hosp ER Eval  9/22/15:  non-spec ABD Pain w normal CT ABD.     -------------------    Preventive health care and Screening summary:   UPDATE  3/6/17  Colonoscopy  -August 2014  Flu vaccine  2014; 2015 HD  -pharm 12/16/15 for HD; high-dose 12/5/16  Pneumovax  Booster 2011; Prevnar 13 given 2/23/15  dT Booster  Tdap March 2012  Zostavax  --patient did not get this from the pharmacy as ordered and now he wants to await the second generation Zostavax from a different  which may have better efficacy--possibly coming later in 2017  PSA  -2.02  8/26/15--note patient is now over the age of 81 so that screening PSA no longer recommended         Review of systems  Positives: As above   Negatives:  No fever/chills, chest pain, shortness of breath, palpitations, nausea/vomiting, abdominal pain, hematuria, dysuria, tremor      Physical exam  Visit Vitals  /78 (BP Location: Left upper arm, Patient  "Position: Sitting)   Pulse 76   Resp 16   Ht 1.6 m (5' 3\")   Wt 61.6 kg (135 lb 12.8 oz)   SpO2 97%   BMI 24.06 kg/m²       Chest clear to auscultation no wheezes rales or rhonchi  Cardiac regular rate and rhythm  Abdomen NABS soft no tenderness  Extremities no significant edema  Neuro awake and alert, affect normal      Medical decision making  Assessment/plan     Diagnosis Plan   1. Benign essential hypertension  Comprehensive metabolic panel    CBC      2. Mixed hyperlipidemia        3. Stage 3a chronic kidney disease (CMS/HCC)        4. Gastroesophageal reflux disease without esophagitis            __________________________________________  CONSOLIDATED ASSESSMENT AND PLAN:  -the below A/P is the consolidated plan for the above conditions:  _____    Hypertension: Blood pressure well-controlled on current regimen.  Continue current treatment.    CKD: Creatinine drifting up to 1.9.  He will hydrate well in the next 48 hours and have his CMP rechecked on 6/6/2025.    Hypercholesterolemia: Stable.  Continue current treatment.    GERD: Stable at this time.    Chronic intermittent anemia: This has been stable in recent years.  Check CBC as ordered.          34  minutes on this date of service was spent by Dr. Elliott  performing the following activities: Previsit medical record review, diagnostic testing review, medical consult report review, obtaining history from patient, performing examination, entering orders, comprehensive medication reconciliation, counseling regarding evaluation and management of the above conditions as detailed above, and documentation of this visit.      "

## 2025-06-05 ENCOUNTER — APPOINTMENT (OUTPATIENT)
Dept: LAB | Facility: CLINIC | Age: 89
End: 2025-06-05
Attending: INTERNAL MEDICINE
Payer: MEDICARE

## 2025-06-05 DIAGNOSIS — I10 BENIGN ESSENTIAL HYPERTENSION: Chronic | ICD-10-CM

## 2025-06-05 LAB
ALBUMIN SERPL-MCNC: 4 G/DL (ref 3.5–5.7)
ALP SERPL-CCNC: 61 IU/L (ref 34–125)
ALT SERPL-CCNC: 16 IU/L (ref 7–52)
ANION GAP SERPL CALC-SCNC: 9 MEQ/L (ref 3–15)
AST SERPL-CCNC: 21 IU/L (ref 13–39)
BILIRUB SERPL-MCNC: 1 MG/DL (ref 0.3–1.2)
BUN SERPL-MCNC: 30 MG/DL (ref 7–25)
CALCIUM SERPL-MCNC: 9.7 MG/DL (ref 8.6–10.3)
CHLORIDE SERPL-SCNC: 101 MEQ/L (ref 98–107)
CO2 SERPL-SCNC: 25 MEQ/L (ref 21–31)
CREAT SERPL-MCNC: 1.5 MG/DL (ref 0.7–1.3)
EGFRCR SERPLBLD CKD-EPI 2021: 44.2 ML/MIN/1.73M*2
ERYTHROCYTE [DISTWIDTH] IN BLOOD BY AUTOMATED COUNT: 15.4 % (ref 11.6–14.4)
GLUCOSE SERPL-MCNC: 92 MG/DL (ref 70–99)
HCT VFR BLD AUTO: 40.8 % (ref 40.1–51)
HGB BLD-MCNC: 13.7 G/DL (ref 13.7–17.5)
MCH RBC QN AUTO: 32.9 PG (ref 28–33.2)
MCHC RBC AUTO-ENTMCNC: 33.6 G/DL (ref 32.2–36.5)
MCV RBC AUTO: 97.8 FL (ref 83–98)
PLATELET # BLD AUTO: 164 K/UL (ref 150–350)
PMV BLD AUTO: 10.2 FL (ref 9.4–12.4)
POTASSIUM SERPL-SCNC: 4.7 MEQ/L (ref 3.5–5.1)
PROT SERPL-MCNC: 6.7 G/DL (ref 6–8.2)
RBC # BLD AUTO: 4.17 M/UL (ref 4.5–5.8)
SODIUM SERPL-SCNC: 135 MEQ/L (ref 136–145)
WBC # BLD AUTO: 5.82 K/UL (ref 3.8–10.5)

## 2025-06-05 PROCEDURE — 85027 COMPLETE CBC AUTOMATED: CPT

## 2025-06-05 PROCEDURE — 36415 COLL VENOUS BLD VENIPUNCTURE: CPT

## 2025-06-05 PROCEDURE — 80053 COMPREHEN METABOLIC PANEL: CPT

## 2025-06-06 ENCOUNTER — RESULTS FOLLOW-UP (OUTPATIENT)
Dept: PRIMARY CARE | Facility: CLINIC | Age: 89
End: 2025-06-06
Payer: MEDICARE

## (undated) DEVICE — ADHESIVE, SKIN DERMABOND ADV .7 ML

## (undated) DEVICE — BLANKET UPPER BODY BAIR HUGGER

## (undated) DEVICE — MANIFOLD SINGLE PORT NEPTUNE

## (undated) DEVICE — GLOVE SZ 8 LINER PROTEXIS PI BL

## (undated) DEVICE — COVER LIGHTHANDLE

## (undated) DEVICE — SYRINGE DISP LUER-LOK 20 CC

## (undated) DEVICE — GUIDEWIDE CANNULATED

## (undated) DEVICE — SUTURE MONOCRYL 3-0  Y497G

## (undated) DEVICE — COBAN 4" LATEX FREE UNSTERILE

## (undated) DEVICE — GOWN SURGICAL REINFORCED X-LAR

## (undated) DEVICE — ***USE 57698*** SLEEVE FLOWTRON DVT CALF SINGLE USE

## (undated) DEVICE — APPLICATOR CHLORAPREP 26ML ORANGE TINT

## (undated) DEVICE — DRESSING ABD STER LGE 8X10

## (undated) DEVICE — SUTURE VICRYL PLUS 2-0 VCP869H

## (undated) DEVICE — DRAPE HALF STERILE

## (undated) DEVICE — PACK RFID HIP PINNING

## (undated) DEVICE — SOLN IRRIG .9%SOD 1000ML

## (undated) DEVICE — ***USE 138537*** SUTURE VICRYL 0 J340H CT-1 27IN VIOLET

## (undated) DEVICE — ADHESIVE SKIN DERMABOND ADVANCED 0.7ML

## (undated) DEVICE — TUBING SMOKE EVAC PENCIL COATED

## (undated) DEVICE — GLOVE SZ 7.5 PROTEXIS CLASSIC LATEX

## (undated) DEVICE — PAD GROUND ELECTROSURGICAL W/CORD

## (undated) DEVICE — SYRINGE DISP LUER-LOK 30 CC

## (undated) DEVICE — SOLN IRRIG STER WATER 1000ML

## (undated) DEVICE — DRESSING MEPILEX 4X4 BORDER